# Patient Record
Sex: FEMALE | Race: WHITE | NOT HISPANIC OR LATINO | Employment: OTHER | URBAN - METROPOLITAN AREA
[De-identification: names, ages, dates, MRNs, and addresses within clinical notes are randomized per-mention and may not be internally consistent; named-entity substitution may affect disease eponyms.]

---

## 2017-02-06 ENCOUNTER — HOSPITAL ENCOUNTER (EMERGENCY)
Facility: MEDICAL CENTER | Age: 42
End: 2017-02-06
Payer: MEDICARE

## 2017-02-06 PROCEDURE — 302449 STATCHG TRIAGE ONLY (STATISTIC)

## 2017-02-06 NOTE — ED NOTES
"Pt amb up to triage desk speaking in full sentences stating: i dont want to barbara\" i explained triage process she still decided to leave. Will take out of system.  "

## 2017-02-17 ENCOUNTER — APPOINTMENT (OUTPATIENT)
Dept: RADIOLOGY | Facility: MEDICAL CENTER | Age: 42
End: 2017-02-17
Attending: EMERGENCY MEDICINE
Payer: MEDICARE

## 2017-02-17 ENCOUNTER — HOSPITAL ENCOUNTER (EMERGENCY)
Facility: MEDICAL CENTER | Age: 42
End: 2017-02-17
Attending: EMERGENCY MEDICINE
Payer: MEDICARE

## 2017-02-17 VITALS
TEMPERATURE: 97.4 F | SYSTOLIC BLOOD PRESSURE: 113 MMHG | HEIGHT: 68 IN | DIASTOLIC BLOOD PRESSURE: 72 MMHG | HEART RATE: 81 BPM | RESPIRATION RATE: 16 BRPM | WEIGHT: 155.87 LBS | OXYGEN SATURATION: 96 % | BODY MASS INDEX: 23.62 KG/M2

## 2017-02-17 DIAGNOSIS — R05.9 COUGH: ICD-10-CM

## 2017-02-17 DIAGNOSIS — M25.511 ACUTE PAIN OF RIGHT SHOULDER: ICD-10-CM

## 2017-02-17 PROCEDURE — 73030 X-RAY EXAM OF SHOULDER: CPT | Mod: RT

## 2017-02-17 PROCEDURE — A9270 NON-COVERED ITEM OR SERVICE: HCPCS | Performed by: EMERGENCY MEDICINE

## 2017-02-17 PROCEDURE — 700102 HCHG RX REV CODE 250 W/ 637 OVERRIDE(OP): Performed by: EMERGENCY MEDICINE

## 2017-02-17 PROCEDURE — 99284 EMERGENCY DEPT VISIT MOD MDM: CPT

## 2017-02-17 PROCEDURE — 71010 DX-CHEST-PORTABLE (1 VIEW): CPT

## 2017-02-17 RX ORDER — OXYCODONE HYDROCHLORIDE AND ACETAMINOPHEN 5; 325 MG/1; MG/1
1-2 TABLET ORAL EVERY 4 HOURS PRN
Qty: 10 TAB | Refills: 0 | Status: SHIPPED | OUTPATIENT
Start: 2017-02-17 | End: 2017-10-14

## 2017-02-17 RX ORDER — OXYCODONE HYDROCHLORIDE AND ACETAMINOPHEN 5; 325 MG/1; MG/1
1 TABLET ORAL ONCE
Status: COMPLETED | OUTPATIENT
Start: 2017-02-17 | End: 2017-02-17

## 2017-02-17 RX ADMIN — OXYCODONE HYDROCHLORIDE AND ACETAMINOPHEN 1 TABLET: 5; 325 TABLET ORAL at 13:46

## 2017-02-17 NOTE — ED AVS SNAPSHOT
2/17/2017          Nancy Olvera  [address]  [place] [address]    Dear Nancy:    [facility] wants to ensure your discharge home is safe and you or your loved ones have had all your questions answered regarding your care after you leave the hospital.    You may receive a telephone call within two days of your discharge.  This call is to make certain you understand your discharge instructions as well as ensure we provided you with the best care possible during your stay with us.     The call will only last approximately 3-5 minutes and will be done by a nurse.    Once again, we want to ensure your discharge home is safe and that you have a clear understanding of any next steps in your care.  If you have any questions or concerns, please do not hesitate to contact us, we are here for you.  Thank you for choosing [facility] for your healthcare needs.    Sincerely,    True Stewart    [facility]

## 2017-02-17 NOTE — ED AVS SNAPSHOT
"[employer] Access Code: [email or web address]  Expires: 2/25/2017 11:54 AM    Your email address is not on file at [facility] [employer].  Email Addresses are required for you to sign up for "[employer] please contact [phone] to verify your personal information and to provide your email address prior to attempting to register for "[employer].    Nancy Olvera  [address]  [place] [address]    "[employer]  A secure, online tool to manage your health information     [employer]’s "[employer]® is a secure, online tool that connects you to your personalized health information from the privacy of your home -- day or night - making it very easy for you to manage your healthcare. Once the activation process is completed, you can even access your medical information using the "[employer] chrissie, which is available for free in the Apple Chrissie store or Google Play store.     To learn more about "[employer] visit [email or web address]/"[employer]    There are two levels of access available (as shown below):   My Chart Features  [facility] Primary Care Doctor [facility]  Specialists [facility]  Urgent  Care Non-[facility] Primary Care Doctor   Email your healthcare team securely and privately 24/7 X X X    Manage appointments: schedule your next appointment; view details of past/upcoming appointments X      Request prescription refills. X      View recent personal medical records, including lab and immunizations X X X X   View health record, including health history, allergies, medications X X X X   Read reports about your outpatient visits, procedures, consult and ER notes X X X X   See your discharge summary, which is a recap of your hospital and/or ER visit that includes your diagnosis, lab results, and care plan X X  X     How to register for [employer]:  Once your e-mail address has been verified, follow the following steps to sign up for "[employer].     1. Go to  [email or web address]  2. Click on the Sign Up Now box, which takes you to the New Member Sign Up page. You will need  to provide the following information:  a. Enter your [employer] Access Code exactly as it appears at the top of this page. (You will not need to use this code after you’ve completed the sign-up process. If you do not sign up before the expiration date, you must request a new code.)   b. Enter your date of birth.   c. Enter your home email address.   d. Click Submit, and follow the next screen’s instructions.  3. Create a [employer] ID. This will be your [employer] login ID and cannot be changed, so think of one that is secure and easy to remember.  4. Create a [employer] password. You can change your password at any time.  5. Enter your Password Reset Question and Answer. This can be used at a later time if you forget your password.   6. Enter your e-mail address. This allows you to receive e-mail notifications when new information is available in [employer].  7. Click Sign Up. You can now view your health information.    For assistance activating your [employer] account, call [phone]

## 2017-02-17 NOTE — ED AVS SNAPSHOT
Home Care Instructions                                                                                                                Nancy lOvera   MRN: [id number]    Department:  [facility], Emergency Dept   Date of Visit:  2/17/2017            [facility], Emergency Dept    [address]    [place] [address]    Phone:  [phone]      You were seen by     Wing Villasenor M.D.      Your Diagnosis Was     Acute pain of right shoulder     M25.511       These are the medications you received during your hospitalization from 02/17/2017 1133 to 02/17/2017 1439     Date/Time Order Dose Route Action    02/17/2017 1346 oxycodone-acetaminophen (PERCOCET) 5-325 MG per tablet 1 Tab 1 Tab Oral Given      Follow-up Information     1. Follow up with [facility] In 1 week.    Contact information    [facility]  [place] [address] [phone]        Medication Information     Review all of your home medications and newly ordered medications with your primary doctor and/or pharmacist as soon as possible. Follow medication instructions as directed by your doctor and/or pharmacist.     Please keep your complete medication list with you and share with your physician. Update the information when medications are discontinued, doses are changed, or new medications (including over-the-counter products) are added; and carry medication information at all times in the event of emergency situations.               Medication List      START taking these medications        Instructions    oxycodone-acetaminophen 5-325 MG Tabs   Commonly known as:  PERCOCET    Take 1-2 Tabs by mouth every four hours as needed (pain).   Dose:  1-2 Tab         ASK your doctor about these medications        Instructions    clonazepam 1 MG Tabs   Commonly known as:  KLONOPIN    Take 0.5 mg by mouth 3 times a day.   Dose:  0.5 mg       divalproex 250 MG Tbec   Commonly known as:  DEPAKOTE    Take 250-750 mg by mouth  See Admin Instructions. 250 mg in morning 750 mg at bedtime   Dose:  250-750 mg       hydrocodone-acetaminophen 5-325 MG Tabs per tablet   Commonly known as:  NORCO    Take 1-2 Tabs by mouth every four hours as needed.   Dose:  1-2 Tab       * levothyroxine 75 MCG Tabs   Commonly known as:  SYNTHROID    Take 75 mcg by mouth Every morning on an empty stomach.   Dose:  75 mcg       * levothyroxine 75 MCG Tabs   Commonly known as:  SYNTHROID    Take 1 Tab by mouth Every morning on an empty stomach.   Dose:  75 mcg       omeprazole 40 MG delayed-release capsule   Commonly known as:  PRILOSEC    Take 40 mg by mouth every day.   Dose:  40 mg       ondansetron 4 MG Tbdp   Commonly known as:  ZOFRAN ODT    Take 4 mg by mouth every 8 hours as needed for Nausea/Vomiting (filled on 08/19/16).   Dose:  4 mg       propranolol 10 MG Tabs   Commonly known as:  INDERAL    Take 10 mg by mouth 2 times a day.   Dose:  10 mg       sucralfate 1 GM Tabs   Commonly known as:  CARAFATE    Take 1 g by mouth 4 Times a Day,Before Meals and at Bedtime.   Dose:  1 g       * Notice:  This list has 2 medication(s) that are the same as other medications prescribed for you. Read the directions carefully, and ask your doctor or other care provider to review them with you.            Procedures and tests performed during your visit     DX-CHEST-PORTABLE (1 VIEW)    DX-SHOULDER 2+ RIGHT        Discharge Instructions       Cough, Adult   A cough is a reflex. It helps you clear your throat and airways. A cough can help heal your body. A cough can last 2 or 3 weeks (acute) or may last more than 8 weeks (chronic). Some common causes of a cough can include an infection, allergy, or a cold.  HOME CARE  · Only take medicine as told by your doctor.  · If given, take your medicines (antibiotics) as told. Finish them even if you start to feel better.  · Use a cold steam vaporizer or humidifier in your home. This can help loosen thick spit (secretions).  · Sleep  so you are almost sitting up (semi-upright). Use pillows to do this. This helps reduce coughing.  · Rest as needed.  · Stop smoking if you smoke.  GET HELP RIGHT AWAY IF:  · You have yellowish-white fluid (pus) in your thick spit.  · Your cough gets worse.  · Your medicine does not reduce coughing, and you are losing sleep.  · You cough up blood.  · You have trouble breathing.  · Your pain gets worse and medicine does not help.  · You have a fever.  MAKE SURE YOU:   · Understand these instructions.  · Will watch your condition.  · Will get help right away if you are not doing well or get worse.     This information is not intended to replace advice given to you by your health care provider. Make sure you discuss any questions you have with your health care provider.     Document Released: 08/30/2012 Document Revised: 01/08/2016 Document Reviewed: 02/24/2016  [employer] Interactive Patient Education ©2016 [employer] Inc.    Musculoskeletal Pain  Musculoskeletal pain is muscle and barbara aches and pains. These pains can occur in any part of the body. Your caregiver may treat you without knowing the cause of the pain. They may treat you if blood or urine tests, X-rays, and other tests were normal.   CAUSES  There is often not a definite cause or reason for these pains. These pains may be caused by a type of germ (virus). The discomfort may also come from overuse. Overuse includes working out too hard when your body is not fit. Barbara aches also come from weather changes. Bone is sensitive to atmospheric pressure changes.  HOME CARE INSTRUCTIONS   · Ask when your test results will be ready. Make sure you get your test results.  · Only take over-the-counter or prescription medicines for pain, discomfort, or fever as directed by your caregiver. If you were given medications for your condition, do not drive, operate machinery or power tools, or sign legal documents for 24 hours. Do not drink alcohol. Do not take sleeping pills  or other medications that may interfere with treatment.  · Continue all activities unless the activities cause more pain. When the pain lessens, slowly resume normal activities. Gradually increase the intensity and duration of the activities or exercise.  · During periods of severe pain, bed rest may be helpful. Lay or sit in any position that is comfortable.  · Putting ice on the injured area.  ¨ Put ice in a bag.  ¨ Place a towel between your skin and the bag.  ¨ Leave the ice on for 15 to 20 minutes, 3 to 4 times a day.  · Follow up with your caregiver for continued problems and no reason can be found for the pain. If the pain becomes worse or does not go away, it may be necessary to repeat tests or do additional testing. Your caregiver may need to look further for a possible cause.  SEEK IMMEDIATE MEDICAL CARE IF:  · You have pain that is getting worse and is not relieved by medications.  · You develop chest pain that is associated with shortness or breath, sweating, feeling sick to your stomach (nauseous), or throw up (vomit).  · Your pain becomes localized to the abdomen.  · You develop any new symptoms that seem different or that concern you.  MAKE SURE YOU:   · Understand these instructions.  · Will watch your condition.  · Will get help right away if you are not doing well or get worse.     This information is not intended to replace advice given to you by your health care provider. Make sure you discuss any questions you have with your health care provider.     Document Released: 12/18/2006 Document Revised: 03/11/2013 Document Reviewed: 08/22/2014  [employer] Interactive Patient Education ©2016 [employer]    Shoulder Pain  The shoulder is the joint that connects your arms to your body. The bones that form the shoulder joint include the upper arm bone (humerus), the shoulder blade (scapula), and the collarbone (clavicle). The top of the humerus is shaped like a ball and fits into a rather flat socket on  the scapula (glenoid cavity). A combination of muscles and strong, fibrous tissues that connect muscles to bones (tendons) support your shoulder joint and hold the ball in the socket. Small, fluid-filled sacs (bursae) are located in different areas of the joint. They act as cushions between the bones and the overlying soft tissues and help reduce friction between the gliding tendons and the bone as you move your arm. Your shoulder joint allows a wide range of motion in your arm. This range of motion allows you to do things like scratch your back or throw a ball. However, this range of motion also makes your shoulder more prone to pain from overuse and injury.  Causes of shoulder pain can originate from both injury and overuse and usually can be grouped in the following four categories:  · Redness, swelling, and pain (inflammation) of the tendon (tendinitis) or the bursae (bursitis).  · Instability, such as a dislocation of the joint.  · Inflammation of the joint (arthritis).  · Broken bone (fracture).  HOME CARE INSTRUCTIONS   · Apply ice to the sore area.  · Put ice in a plastic bag.  · Place a towel between your skin and the bag.  · Leave the ice on for 15-20 minutes, 3-4 times per day for the first 2 days, or as directed by your health care provider.  · Stop using cold packs if they do not help with the pain.  · If you have a shoulder sling or immobilizer, wear it as long as your caregiver instructs. Only remove it to shower or bathe. Move your arm as little as possible, but keep your hand moving to prevent swelling.  · Squeeze a soft ball or foam pad as much as possible to help prevent swelling.  · Only take over-the-counter or prescription medicines for pain, discomfort, or fever as directed by your caregiver.  SEEK MEDICAL CARE IF:   · Your shoulder pain increases, or new pain develops in your arm, hand, or fingers.  · Your hand or fingers become cold and numb.  · Your pain is not relieved with  medicines.  SEEK IMMEDIATE MEDICAL CARE IF:   · Your arm, hand, or fingers are numb or tingling.  · Your arm, hand, or fingers are significantly swollen or turn white or blue.  MAKE SURE YOU:   · Understand these instructions.  · Will watch your condition.  · Will get help right away if you are not doing well or get worse.     This information is not intended to replace advice given to you by your health care provider. Make sure you discuss any questions you have with your health care provider.     Document Released: 09/27/2006 Document Revised: 01/08/2016 Document Reviewed: 04/11/2016  [employer] [facility] Interactive Patient Education ©2016 [employer]    Shoulder Pain  The shoulder is the joint that connects your arm to your body. Muscles and band-like tissues that connect bones to muscles (tendons) hold the joint together. Shoulder pain is felt if an injury or medical problem affects one or more parts of the shoulder.  HOME CARE   · Put ice on the sore area.  ¨ Put ice in a plastic bag.  ¨ Place a towel between your skin and the bag.  ¨ Leave the ice on for 15-20 minutes, 03-04 times a day for the first 2 days.  · Stop using cold packs if they do not help with the pain.  · If you were given something to keep your shoulder from moving (sling; shoulder immobilizer), wear it as told. Only take it off to shower or bathe.  · Move your arm as little as possible, but keep your hand moving to prevent puffiness (swelling).  · Squeeze a soft ball or foam pad as much as possible to help prevent swelling.  · Take medicine as told by your doctor.  GET HELP IF:  · You have progressing new pain in your arm, hand, or fingers.  · Your hand or fingers get cold.  · Your medicine does not help lessen your pain.  GET HELP RIGHT AWAY IF:   · Your arm, hand, or fingers are numb or tingling.  · Your arm, hand, or fingers are puffy (swollen), painful, or turn white or blue.  MAKE SURE YOU:   · Understand these instructions.  · Will watch your  condition.  · Will get help right away if you are not doing well or get worse.     This information is not intended to replace advice given to you by your health care provider. Make sure you discuss any questions you have with your health care provider.     Document Released: 06/05/2009 Document Revised: 01/08/2016 Document Reviewed: 04/11/2016  [employer] Interactive Patient Education ©2016 [employer]            Patient Information     Patient Information    Following emergency treatment: all patient requiring follow-up care must return either to a private physician or a clinic if your condition worsens before you are able to obtain further medical attention, please return to the emergency room.     Billing Information    At [facility], we work to make the billing process streamlined for our patients.  Our Representatives are here to answer any questions you may have regarding your hospital bill.  If you have insurance coverage and have supplied your insurance information to us, we will submit a claim to your insurer on your behalf.  Should you have any questions regarding your bill, we can be reached online or by phone as follows:  Online: You are able pay your bills online or live chat with our representatives about any billing questions you may have. We are here to help Monday - Friday from 8:00am to 7:30pm and 9:00am - 12:00pm on Saturdays.  Please visit [email or web address]  for more information.   Phone:  [phone] or [phone]    Please note that your emergency physician, surgeon, pathologist, radiologist, anesthesiologist, and other specialists are not employed by [facility] and will therefore bill separately for their services.  Please contact them directly for any questions concerning their bills at the numbers below:     Emergency Physician Services:  [phone]  [facility]:  [phone]  [facility]:  [phone]  [facility]:  [phone]    1. Your final bill may vary from the amount quoted upon discharge if all procedures are not complete at that time, or if your doctor has additional procedures of which we are not aware. You will receive an additional bill if you return to the Emergency Department at [facility] for suture removal regardless of the facility of which the sutures were placed.     2. Please arrange for settlement of this account at the emergency registration.    3. All self-pay accounts are due in full at the time of treatment.  If you are unable to meet this obligation then payment is expected within 4-5 days.     4. If you have had radiology studies (CT, X-ray, Ultrasound, MRI), you have received a preliminary result during your emergency department visit. Please contact the radiology department [phone] to receive a copy of your final result. Please discuss the Final result with your primary physician or with the follow up physician provided.     Crisis Hotline:  [place] Crisis Hotline:  7-370-HRYZLNL or 1-594.506.7486  [place] Crisis Hotline:    1-327.714.4059 or 968-828-9077         ED Discharge Follow Up Questions    1. In order to provide you with very good care, we would like to follow up with a phone call in the next few days.  May we have your permission to contact you?     YES /  NO    2. What is the best phone number to call you? (       )_____-__________    3. What is the best time to call you?      Morning  /  Afternoon  /  Evening                   Patient Signature:  ____________________________________________________________    Date:  ____________________________________________________________

## 2017-02-17 NOTE — ED NOTES
Pt ambs to triage  Chief Complaint   Patient presents with   • Shoulder Pain     Pt was coughing so hard that she slammed her arm into the wall and hurt her shoulder.  Feels like an electric shock in arm   • Cough     is being treated for PNA has taken her antibx but still has a cough   Triage process explained. Pt asked to await room assignment in [facility]. Pt urged to inform triage RN or staff of changes in condition or concerns

## 2017-02-17 NOTE — DISCHARGE INSTRUCTIONS
Cough, Adult   A cough is a reflex. It helps you clear your throat and airways. A cough can help heal your body. A cough can last 2 or 3 weeks (acute) or may last more than 8 weeks (chronic). Some common causes of a cough can include an infection, allergy, or a cold.  HOME CARE  · Only take medicine as told by your doctor.  · If given, take your medicines (antibiotics) as told. Finish them even if you start to feel better.  · Use a cold steam vaporizer or humidifier in your home. This can help loosen thick spit (secretions).  · Sleep so you are almost sitting up (semi-upright). Use pillows to do this. This helps reduce coughing.  · Rest as needed.  · Stop smoking if you smoke.  GET HELP RIGHT AWAY IF:  · You have yellowish-white fluid (pus) in your thick spit.  · Your cough gets worse.  · Your medicine does not reduce coughing, and you are losing sleep.  · You cough up blood.  · You have trouble breathing.  · Your pain gets worse and medicine does not help.  · You have a fever.  MAKE SURE YOU:   · Understand these instructions.  · Will watch your condition.  · Will get help right away if you are not doing well or get worse.     This information is not intended to replace advice given to you by your health care provider. Make sure you discuss any questions you have with your health care provider.     Document Released: 08/30/2012 Document Revised: 01/08/2016 Document Reviewed: 02/24/2016  [employer] Interactive Patient Education ©2016 [employer]    Musculoskeletal Pain  Musculoskeletal pain is muscle and gregg aches and pains. These pains can occur in any part of the body. Your caregiver may treat you without knowing the cause of the pain. They may treat you if blood or urine tests, X-rays, and other tests were normal.   CAUSES  There is often not a definite cause or reason for these pains. These pains may be caused by a type of germ (virus). The discomfort may also come from overuse. Overuse includes working out  too hard when your body is not fit. Barbara aches also come from weather changes. Bone is sensitive to atmospheric pressure changes.  HOME CARE INSTRUCTIONS   · Ask when your test results will be ready. Make sure you get your test results.  · Only take over-the-counter or prescription medicines for pain, discomfort, or fever as directed by your caregiver. If you were given medications for your condition, do not drive, operate machinery or power tools, or sign legal documents for 24 hours. Do not drink alcohol. Do not take sleeping pills or other medications that may interfere with treatment.  · Continue all activities unless the activities cause more pain. When the pain lessens, slowly resume normal activities. Gradually increase the intensity and duration of the activities or exercise.  · During periods of severe pain, bed rest may be helpful. Lay or sit in any position that is comfortable.  · Putting ice on the injured area.  ¨ Put ice in a bag.  ¨ Place a towel between your skin and the bag.  ¨ Leave the ice on for 15 to 20 minutes, 3 to 4 times a day.  · Follow up with your caregiver for continued problems and no reason can be found for the pain. If the pain becomes worse or does not go away, it may be necessary to repeat tests or do additional testing. Your caregiver may need to look further for a possible cause.  SEEK IMMEDIATE MEDICAL CARE IF:  · You have pain that is getting worse and is not relieved by medications.  · You develop chest pain that is associated with shortness or breath, sweating, feeling sick to your stomach (nauseous), or throw up (vomit).  · Your pain becomes localized to the abdomen.  · You develop any new symptoms that seem different or that concern you.  MAKE SURE YOU:   · Understand these instructions.  · Will watch your condition.  · Will get help right away if you are not doing well or get worse.     This information is not intended to replace advice given to you by your health care  provider. Make sure you discuss any questions you have with your health care provider.     Document Released: 12/18/2006 Document Revised: 03/11/2013 Document Reviewed: 08/22/2014  [employer] Interactive Patient Education ©2016 [employer]    Shoulder Pain  The shoulder is the joint that connects your arms to your body. The bones that form the shoulder joint include the upper arm bone (humerus), the shoulder blade (scapula), and the collarbone (clavicle). The top of the humerus is shaped like a ball and fits into a rather flat socket on the scapula (glenoid cavity). A combination of muscles and strong, fibrous tissues that connect muscles to bones (tendons) support your shoulder joint and hold the ball in the socket. Small, fluid-filled sacs (bursae) are located in different areas of the joint. They act as cushions between the bones and the overlying soft tissues and help reduce friction between the gliding tendons and the bone as you move your arm. Your shoulder joint allows a wide range of motion in your arm. This range of motion allows you to do things like scratch your back or throw a ball. However, this range of motion also makes your shoulder more prone to pain from overuse and injury.  Causes of shoulder pain can originate from both injury and overuse and usually can be grouped in the following four categories:  · Redness, swelling, and pain (inflammation) of the tendon (tendinitis) or the bursae (bursitis).  · Instability, such as a dislocation of the joint.  · Inflammation of the joint (arthritis).  · Broken bone (fracture).  HOME CARE INSTRUCTIONS   · Apply ice to the sore area.  · Put ice in a plastic bag.  · Place a towel between your skin and the bag.  · Leave the ice on for 15-20 minutes, 3-4 times per day for the first 2 days, or as directed by your health care provider.  · Stop using cold packs if they do not help with the pain.  · If you have a shoulder sling or immobilizer, wear it as long as your  caregiver instructs. Only remove it to shower or bathe. Move your arm as little as possible, but keep your hand moving to prevent swelling.  · Squeeze a soft ball or foam pad as much as possible to help prevent swelling.  · Only take over-the-counter or prescription medicines for pain, discomfort, or fever as directed by your caregiver.  SEEK MEDICAL CARE IF:   · Your shoulder pain increases, or new pain develops in your arm, hand, or fingers.  · Your hand or fingers become cold and numb.  · Your pain is not relieved with medicines.  SEEK IMMEDIATE MEDICAL CARE IF:   · Your arm, hand, or fingers are numb or tingling.  · Your arm, hand, or fingers are significantly swollen or turn white or blue.  MAKE SURE YOU:   · Understand these instructions.  · Will watch your condition.  · Will get help right away if you are not doing well or get worse.     This information is not intended to replace advice given to you by your health care provider. Make sure you discuss any questions you have with your health care provider.     Document Released: 09/27/2006 Document Revised: 01/08/2016 Document Reviewed: 04/11/2016  [employer] Interactive Patient Education ©2016 [employer]    Shoulder Pain  The shoulder is the joint that connects your arm to your body. Muscles and band-like tissues that connect bones to muscles (tendons) hold the joint together. Shoulder pain is felt if an injury or medical problem affects one or more parts of the shoulder.  HOME CARE   · Put ice on the sore area.  ¨ Put ice in a plastic bag.  ¨ Place a towel between your skin and the bag.  ¨ Leave the ice on for 15-20 minutes, 03-04 times a day for the first 2 days.  · Stop using cold packs if they do not help with the pain.  · If you were given something to keep your shoulder from moving (sling; shoulder immobilizer), wear it as told. Only take it off to shower or bathe.  · Move your arm as little as possible, but keep your hand moving to prevent puffiness  (swelling).  · Squeeze a soft ball or foam pad as much as possible to help prevent swelling.  · Take medicine as told by your doctor.  GET HELP IF:  · You have progressing new pain in your arm, hand, or fingers.  · Your hand or fingers get cold.  · Your medicine does not help lessen your pain.  GET HELP RIGHT AWAY IF:   · Your arm, hand, or fingers are numb or tingling.  · Your arm, hand, or fingers are puffy (swollen), painful, or turn white or blue.  MAKE SURE YOU:   · Understand these instructions.  · Will watch your condition.  · Will get help right away if you are not doing well or get worse.     This information is not intended to replace advice given to you by your health care provider. Make sure you discuss any questions you have with your health care provider.     Document Released: 06/05/2009 Document Revised: 01/08/2016 Document Reviewed: 04/11/2016  [employer] Interactive Patient Education ©2016 [employer]

## 2017-02-17 NOTE — ED PROVIDER NOTES
"ED Provider Note    Scribed for Wing Villasenor M.D. by Sigrid Rivera. 2/17/2017  12:11 PM    Primary Care Provider: None  Means of arrival: Walk-in  History limited by: None    CHIEF COMPLAINT  Chief Complaint   Patient presents with   • Shoulder Pain     Pt was coughing so hard that she slammed her arm into the wall and hurt her shoulder.  Feels like an electric shock in arm   • Cough     is being treated for PNA has taken her antibx but still has a cough       HPI  Nancy Olvera is a 42 y.o. female who presents to the ED complaining of right shoulder pain status post trauma onset yesterday. The patient was seen by her PCP previously for a cough and is being treated for pneumonia with Zithromax. She states she continues to experience a cough with \"several coughing attacks\". Patient states she was having another coughing fit yesterday when she quickly lost vision in her eyes and slammed her right upper extremity into a wall, developed right shoulder pain immediately.  She describes the pain as an \"electric shock in her arm\", per nurse's notes. She denies any production of phlegm, headache, blurred vision, vomiting, or diarrhea. Patient did not medicate at home for pain relief.     REVIEW OF SYSTEMS  EYES:  Denies vision changes  ENT:  Denies sore throat, nose, or ear pain  RESPIRATORY: Cough, denies production of phlegm  GI:  Denies nausea, vomiting, or diarrhea  MUSCULOSKELETAL: Right shoulder pain  NEUROLOGIC:  Denies headache    E.     PAST MEDICAL HISTORY  Past Medical History   Diagnosis Date   • Thyroid condition    • Congestive heart failure (CMS-HCC)    • Hypertension    • Psychiatric disorder    • ADHD (attention deficit hyperactivity disorder)    • Bipolar affective disorder (CMS-HCC)    • Cancer (CMS-HCC)      pt states she has colon cancer     FAMILY HISTORY  No family history noted    SOCIAL HISTORY   reports that she has been smoking Cigarettes.  She has been smoking about 0.50 packs per day. She reports " "that she drinks alcohol. She reports that she uses illicit drugs.    SURGICAL HISTORY  Past Surgical History   Procedure Laterality Date   • Tonsillectomy         CURRENT MEDICATIONS  No current facility-administered medications for this encounter.    Current outpatient prescriptions:   •  levothyroxine (SYNTHROID) 75 MCG Tab, Take 1 Tab by mouth Every morning on an empty stomach., Disp: 30 Tab, Rfl: 0  •  hydrocodone-acetaminophen (NORCO) 5-325 MG Tab per tablet, Take 1-2 Tabs by mouth every four hours as needed., Disp: 10 Tab, Rfl: 0  •  propranolol (INDERAL) 10 MG Tab, Take 10 mg by mouth 2 times a day., Disp: , Rfl:   •  levothyroxine (SYNTHROID) 75 MCG Tab, Take 75 mcg by mouth Every morning on an empty stomach., Disp: , Rfl:   •  omeprazole (PRILOSEC) 40 MG delayed-release capsule, Take 40 mg by mouth every day., Disp: , Rfl:   •  clonazepam (KLONOPIN) 1 MG Tab, Take 0.5 mg by mouth 3 times a day., Disp: , Rfl:   •  divalproex (DEPAKOTE) 250 MG Tablet Delayed Response, Take 250-750 mg by mouth See Admin Instructions. 250 mg in morning 750 mg at bedtime, Disp: , Rfl:   •  sucralfate (CARAFATE) 1 GM Tab, Take 1 g by mouth 4 Times a Day,Before Meals and at Bedtime., Disp: , Rfl:   •  ondansetron (ZOFRAN ODT) 4 MG TABLET DISPERSIBLE, Take 4 mg by mouth every 8 hours as needed for Nausea/Vomiting (filled on 08/19/16)., Disp: , Rfl:     ALLERGIES  Allergies   Allergen Reactions   • Aspirin Anaphylaxis   • Compazine Swelling   • Sulfa Drugs Rash   • Tetracyclines Swelling   • Ultram [Tramadol Hcl] Swelling   • Food      \"Green Peppers\"       PHYSICAL EXAM  VITAL SIGNS: /73 mmHg  Pulse 101  Temp(Src) 36.3 °C (97.4 °F) (Temporal)  Resp 14  Ht 1.727 m (5' 7.99\")  Wt 70.7 kg (155 lb 13.8 oz)  BMI 23.70 kg/m2  SpO2 96%     Constitutional: Patient is awake and alert. No acute respiratory distress. Well developed, Well nourished, Non-toxic appearance.  HENT: Normocephalic, AtraumaticEyes:  Sclera and " conjunctiva clear, No discharge.   Neck:  Supple no nuchal rigidity, no thyromegaly or mass. Non-tender  Lymphatic: No supraclavicular  lymph nodes.   Cardiovascular: Heart is regular rate and rhythm   Thorax & Lungs: Chest is symmetrical, with good breath sounds. No wheezing or crackles. No respiratory distress,   Skin: Warm, Dry, no petechia, purpura, or rash.   Extremities: No edema.   Musculoskeletal: Tenderness right shoulder with range of motion mostly in the trapezius muscle on the right side.  Neurologic: Alert & oriented  Strength is symmetrical lower extremities. Good strength in left arm. Somewhat decreased due to right shoulder pain with range of motion. Good .         RADIOLOGY/PROCEDURES  DX-SHOULDER 2+ RIGHT   Final Result      No acute fracture of the right humerus identified. If symptoms are persistent, follow-up imaging would be recommended.      DX-CHEST-PORTABLE (1 VIEW)   Final Result      Negative single view of the chest.      The radiologist's interpretations of all radiological studies have been reviewed by me.     COURSE & MEDICAL DECISION MAKING  Pertinent Labs & Imaging studies reviewed. (See chart for details)    Differential diagnoses include but are not limited to fracture vs contusion.    12:11 PM - Patient seen and examined at bedside. Ordered DX-shoulder right and DX-chest. I have signed into and reviewed the patient's prescription monitoring program data prior to prescribing a scheduled drug.    1:14 PM Patient is complaining of pain and is requesting pain medications. She states she is not allergic to percocet, so she will be medicated with a dose of Percocet.     2:16 PM Reviewed the patient's imaging results, which are noted above. Patient was reevaluated at bedside. She reports pain relief after medication administration. Discussed radiology results with the patient and informed them that results were unremarkable. She is advised to follow up with her PCP if pain persists  and will be prescribed Percocet for pain relief. The patient will be discharged and should return if symptoms worsen or if new symptoms arise. The patient understands and agrees to plan.     Decision Making  Patient states she fell against her right shoulder. Pain to right shoulder and chest also had a cough. She's been on Zithromax and erythromycin. The coughs though there but she says that the infection like feeling since. At her. X-rays of her shoulder shows no fractures. Chest x-ray showed no pneumonia. This period time we will treat her for musculoskeletal pain. She will follow up with the heart clinic within one week. No obvious signs of fractures.    I reviewed prescription monitoring program for patient's narcotic use before prescribing a scheduled drug.The patient will not drink alcohol nor drive with prescribed medications. The patient will return for new or worsening symptoms and is stable at the time of discharge.    The patient is referred to a primary physician for blood pressure management, diabetic screening, and for all other preventative health concerns.    DISPOSITION:  Patient will be discharged home in stable condition.    FOLLOW UP:  [facility]  [id number]  [place] [address]  [phone]    In 1 week        OUTPATIENT MEDICATIONS:  New Prescriptions    OXYCODONE-ACETAMINOPHEN (PERCOCET) 5-325 MG TAB    Take 1-2 Tabs by mouth every four hours as needed (pain).     FINAL IMPRESSION  1. Acute pain of right shoulder    2. Cough        PLAN  1. Follow-up primary care doctor [facility] within 1 week  2. Shoulder pain information sheets/narcotic information sheet  3. Return to the emergency department for increased pains, fevers, vomiting or change in condition.     Sigrid QURESHI (Kayla), am scribing for, and in the presence of, Wing Villasenor M.D..    Electronically signed by: Sigrid Graham), 2/17/2017    Wing QURESHI M.D. personally performed the services described in this  documentation, as scribed by Sigrid Rivera in my presence, and it is both accurate and complete.    The note accurately reflects work and decisions made by me.  Wing Villasenor  2/17/2017  5:19 PM

## 2017-02-17 NOTE — ED NOTES
Patient states that she almost fell yesterday but caught herself, now having pain in her right shoulder.  Also has cough

## 2017-03-04 ENCOUNTER — HOSPITAL ENCOUNTER (EMERGENCY)
Facility: MEDICAL CENTER | Age: 42
End: 2017-03-04
Attending: EMERGENCY MEDICINE
Payer: MEDICARE

## 2017-03-04 VITALS
HEART RATE: 80 BPM | OXYGEN SATURATION: 96 % | HEIGHT: 68 IN | DIASTOLIC BLOOD PRESSURE: 68 MMHG | SYSTOLIC BLOOD PRESSURE: 112 MMHG | TEMPERATURE: 97.7 F | RESPIRATION RATE: 18 BRPM

## 2017-03-04 DIAGNOSIS — Z72.0 TOBACCO ABUSE: ICD-10-CM

## 2017-03-04 DIAGNOSIS — M54.50 ACUTE LEFT-SIDED LOW BACK PAIN WITHOUT SCIATICA: ICD-10-CM

## 2017-03-04 DIAGNOSIS — J01.00 ACUTE NON-RECURRENT MAXILLARY SINUSITIS: ICD-10-CM

## 2017-03-04 PROCEDURE — 99283 EMERGENCY DEPT VISIT LOW MDM: CPT

## 2017-03-04 PROCEDURE — 36415 COLL VENOUS BLD VENIPUNCTURE: CPT

## 2017-03-04 RX ORDER — CEFDINIR 300 MG/1
300 CAPSULE ORAL 2 TIMES DAILY
Qty: 20 CAP | Refills: 0 | Status: SHIPPED | OUTPATIENT
Start: 2017-03-04 | End: 2017-10-14

## 2017-03-04 RX ORDER — NICOTINE 21 MG/24HR
1 PATCH, TRANSDERMAL 24 HOURS TRANSDERMAL EVERY 24 HOURS
Qty: 30 PATCH | Refills: 1 | Status: SHIPPED | OUTPATIENT
Start: 2017-03-04 | End: 2018-11-25 | Stop reason: CLARIF

## 2017-03-04 RX ORDER — ALBUTEROL SULFATE 2.5 MG/3ML
2.5 SOLUTION RESPIRATORY (INHALATION) EVERY 4 HOURS PRN
Qty: 25 ML | Refills: 1 | Status: SHIPPED | OUTPATIENT
Start: 2017-03-04 | End: 2018-11-25 | Stop reason: CLARIF

## 2017-03-04 RX ORDER — HYDROCODONE BITARTRATE AND ACETAMINOPHEN 5; 325 MG/1; MG/1
1-2 TABLET ORAL EVERY 4 HOURS PRN
Qty: 20 TAB | Refills: 0 | Status: SHIPPED | OUTPATIENT
Start: 2017-03-04 | End: 2017-10-14

## 2017-03-04 RX ORDER — NICOTINE 21 MG/24HR
1 PATCH, TRANSDERMAL 24 HOURS TRANSDERMAL ONCE
Status: DISCONTINUED | OUTPATIENT
Start: 2017-03-04 | End: 2017-03-04 | Stop reason: HOSPADM

## 2017-03-04 ASSESSMENT — LIFESTYLE VARIABLES: DO YOU DRINK ALCOHOL: NO

## 2017-03-04 NOTE — ED NOTES
.  Chief Complaint   Patient presents with   • Cough     productive cough x3 days   • Ear Pain   • Sore Throat   • Back Pain     .Pt Informed regarding triage process and verbalized understanding to inform triage tech or RN for any changes in condition.  Placed in lobby.

## 2017-03-04 NOTE — ED NOTES
"Pt to red 4 from lobby. C/o back pain, SOB, chest pain \"from breathing.\" On monitor. ERP to see.   "

## 2017-03-04 NOTE — ED PROVIDER NOTES
"ED Provider Note    Scribed for Angélica Padilla M.D. by Angie Sarmiento. 3/4/2017  6:24 AM    Primary care provider: Pcp Pt States None  Means of arrival: Walk-In  History obtained from: Patient  History limited by: None    CHIEF COMPLAINT  Chief Complaint   Patient presents with   • Cough     productive cough x3 days   • Ear Pain     lt ear pain   • Sore Throat   • Back Pain     CARLTON Olvera is a 42 y.o. female who presents to the Emergency Department with persistent productive cough, left ear pain, throat pain, and back pain onset three days ago.  The patient's symptoms have remained constant in nature.  She reports her back pain worsening since onset.  Fourteen years ago she had back surgery and believes she has \"popped\" her back due to her cough.  The patient reports being unable to stand straight due to pain radiating down her spine.  Secondary to her cough and back pain, the patient has developed acute, occasional urinary incontinence.  The patient has tried to treat her symptoms with Mucinex but has not found relief.  Currently, the patient has continued cough and pain.  She does not note recent fevers, chills, nausea, vomiting, abdominal pain, weakness, headache, or leg swelling.   The patient smokes cigarettes.  She denies recent alcohol consumption or drug use.  The patient denies additional symptoms or further pertinent medical history.     REVIEW OF SYSTEMS  HEENT:  Positive for ear pain, congestion, sore throat   CARDIAC: no chest pain, no palpitations    PULMONARY: no dyspnea Positive for cough  GI: no vomiting, diarrhea, or abdominal pain   : no dysuria, hematuria Positive for back pain, incontinence due to cough  Neuro: no weakness, numbness, aphasia, or headache  Musculoskeletal: no swelling, deformity, pain, or joint swelling  Endocrine: no fevers, sweating, or weight loss     See history of present illness. All other systems are negative. C.     PAST MEDICAL HISTORY   has a past medical " history of Thyroid condition; Congestive heart failure (CMS-HCC); Hypertension; Psychiatric disorder; ADHD (attention deficit hyperactivity disorder); Bipolar affective disorder (CMS-HCC); and Cancer (CMS-HCC).    SURGICAL HISTORY   has past surgical history that includes tonsillectomy.   Back surgery    SOCIAL HISTORY  Social History   Substance Use Topics   • Smoking status: Current Some Day Smoker -- 0.50 packs/day     Types: Cigarettes   • Smokeless tobacco: None   • Alcohol Use: No      History   Drug Use   • Yes     Comment: medical marijuana     FAMILY HISTORY  History reviewed. No pertinent family history.    CURRENT MEDICATIONS  No current facility-administered medications on file prior to encounter.     Current Outpatient Prescriptions on File Prior to Encounter   Medication Sig Dispense Refill   • oxycodone-acetaminophen (PERCOCET) 5-325 MG Tab Take 1-2 Tabs by mouth every four hours as needed (pain). 10 Tab 0   • levothyroxine (SYNTHROID) 75 MCG Tab Take 1 Tab by mouth Every morning on an empty stomach. 30 Tab 0   • hydrocodone-acetaminophen (NORCO) 5-325 MG Tab per tablet Take 1-2 Tabs by mouth every four hours as needed. 10 Tab 0   • propranolol (INDERAL) 10 MG Tab Take 10 mg by mouth 2 times a day.     • levothyroxine (SYNTHROID) 75 MCG Tab Take 75 mcg by mouth Every morning on an empty stomach.     • omeprazole (PRILOSEC) 40 MG delayed-release capsule Take 40 mg by mouth every day.     • clonazepam (KLONOPIN) 1 MG Tab Take 0.5 mg by mouth 3 times a day.     • divalproex (DEPAKOTE) 250 MG Tablet Delayed Response Take 250-750 mg by mouth See Admin Instructions. 250 mg in morning  750 mg at bedtime     • sucralfate (CARAFATE) 1 GM Tab Take 1 g by mouth 4 Times a Day,Before Meals and at Bedtime.     • ondansetron (ZOFRAN ODT) 4 MG TABLET DISPERSIBLE Take 4 mg by mouth every 8 hours as needed for Nausea/Vomiting (filled on 08/19/16).       ALLERGIES  Allergies   Allergen Reactions   • Aspirin Anaphylaxis  "  • Compazine Swelling   • Sulfa Drugs Rash   • Tetracyclines Swelling   • Ultram [Tramadol Hcl] Swelling   • Food      \"Green Peppers\"     PHYSICAL EXAM  VITAL SIGNS: /68 mmHg  Pulse 55  Temp(Src) 36.5 °C (97.7 °F)  Resp 18  Ht 1.727 m (5' 8\")  SpO2 96%  LMP 02/04/2017 (Approximate)    Constitutional: Well developed, Well nourished, No acute distress, Non-toxic appearance.   HEENT: Normocephalic, Atraumatic,  external ears normal, pharynx pink,  Mucous Membranes moist, No rhinorrhea or mucosal edema, Maxillary sinus Tenderness, Fluid-filled TM's without infection, Posterior pharyngeal drainage,   Eyes: PERRL, EOMI, Conjunctiva normal, No discharge.   Neck: Normal range of motion, No tenderness, Supple, No stridor.   Lymphatic: No lymphadenopathy    Cardiovascular: Regular Rate and Rhythm, No murmurs,  rubs, or gallops.   Thorax & Lungs: Lungs clear to auscultation bilaterally, No respiratory distress, No wheezes, rhales or rhonchi, No chest wall tenderness.   Abdomen: Bowel sounds normal, Soft, non tender, non distended,  No pulsatile masses., no rebound guarding or peritoneal signs.   Skin: Warm, Dry, No erythema, No rash,   Back:  Left-sided back pain,  No spinal tenderness, bony crepitance, step offs, or instability.   Neurologic: Alert & oriented x 3, Normal motor function, Normal sensory function, No focal deficits noted. Normal reflexes. Normal Cranial Nerves. Normal strength and sensation.   Extremities: Equal, intact distal pulses, No cyanosis, clubbing or edema,  No tenderness.   Musculoskeletal: Good range of motion in all major joints. No tenderness to palpation or major deformities noted.     COURSE & MEDICAL DECISION MAKING  Nursing notes, VS, PMSFHx reviewed in chart.    Review of past medical records shows the patient was seen here on 2/17/17 for shoulder pain with cough.  The patient was seen by Dr. Villasenor and prescribed Percocet.  The patient's Tox screen was positive for methamphetamine " and benzodiazepines.  The patient was not prescribed antibiotics.     6:24 AM - Patient seen and examined at bedside.  Patient presents with acute maxillary sinusitis. I discussed her plans for discharge with a prescription for Norco, Proventil, Omnicef, and Nicoderm. She was encouraged to stop smoking.  The patient was given a referral to [facility] and was instructed to return to the ED if her symptoms worsen. The patient will receive further information on sinusitis, back pain, and back exercises to take home.  All questions and concerns were addressed.  Patient understands and agrees.     I reviewed prescription monitoring program for patient's narcotic use before prescribing a scheduled drug. Her  score is 180. The patient will not drink alcohol nor drive with prescribed medications. The patient will return for new or worsening symptoms and is stable at the time of discharge.The patient is referred to a primary physician for blood pressure management, diabetic screening, and for all other preventative health concerns.    DISPOSITION:  Patient will be discharged home in stable condition.    FOLLOW UP:  [address] [address] [phone]    keep appointment next week    OUTPATIENT MEDICATIONS:  Discharge Medication List as of 3/4/2017  6:39 AM      START taking these medications    Details   !! hydrocodone-acetaminophen (NORCO) 5-325 MG Tab per tablet Take 1-2 Tabs by mouth every four hours as needed., Disp-20 Tab, R-0, Print Rx Paper      albuterol (PROVENTIL) 2.5mg/3ml Nebu Soln solution for nebulization 3 mL by Nebulization route every four hours as needed for Shortness of Breath., Disp-25 mL, R-1, Print Rx Paper      cefdinir (OMNICEF) 300 MG Cap Take 1 Cap by mouth 2 times a day., Disp-20 Cap, R-0, Print Rx Paper      nicotine (NICODERM) 14 MG/24HR PATCH 24 HR Apply 1 Patch to skin as directed every 24 hours., Disp-30 Patch, R-1,  Print Rx Paper       !! - Potential duplicate medications found. Please discuss with provider.        FINAL IMPRESSION  1. Acute non-recurrent maxillary sinusitis    2. Acute left-sided low back pain without sciatica    3. Tobacco abuse        Angie QURESHI (Scribe), am scribing for, and in the presence of, Angélica Padilla M.D..    Electronically signed by: Angie Sarmiento (Scribe), 3/4/2017    IAngélica M.D. personally performed the services described in this documentation, as scribed by Angie Sarmiento in my presence, and it is both accurate and complete.    The note accurately reflects work and decisions made by me.  Angélica Padilla  3/4/2017  12:58 PM

## 2017-03-04 NOTE — DISCHARGE INSTRUCTIONS
Back Exercises  Back exercises help treat and prevent back injuries. The goal is to increase your strength in your belly (abdominal) and back muscles. These exercises can also help with flexibility. Start these exercises when told by your doctor.  HOME CARE  Back exercises include:  Pelvic Tilt.  · Lie on your back with your knees bent. Tilt your pelvis until the lower part of your back is against the floor. Hold this position 5 to 10 sec. Repeat this exercise 5 to 10 times.  Knee to Chest.  · Pull 1 knee up against your chest and hold for 20 to 30 seconds. Repeat this with the other knee. This may be done with the other leg straight or bent, whichever feels better. Then, pull both knees up against your chest.  Sit-Ups or Curl-Ups.  · Bend your knees 90 degrees. Start with tilting your pelvis, and do a partial, slow sit-up. Only lift your upper half 30 to 45 degrees off the floor. Take at least 2 to 3 seonds for each sit-up. Do not do sit-ups with your knees out straight. If partial sit-ups are difficult, simply do the above but with only tightening your belly (abdominal) muscles and holding it as told.  Hip-Lift.  · Lie on your back with your knees flexed 90 degrees. Push down with your feet and shoulders as you raise your hips 2 inches off the floor. Hold for 10 seconds, repeat 5 to 10 times.  Back Arches.  · Lie on your stomach. Prop yourself up on bent elbows. Slowly press on your hands, causing an arch in your low back. Repeat 3 to 5 times.  Shoulder-Lifts.  · Lie face down with arms beside your body. Keep hips and belly pressed to floor as you slowly lift your head and shoulders off the floor.  Do not overdo your exercises. Be careful in the beginning. Exercises may cause you some mild back discomfort. If the pain lasts for more than 15 minutes, stop the exercises until you see your doctor. Improvement with exercise for back problems is slow.      This information is not intended to replace advice given to you  by your health care provider. Make sure you discuss any questions you have with your health care provider.     Document Released: 01/20/2012 Document Revised: 03/11/2013 Document Reviewed: 02/11/2016  [employer] Interactive Patient Education ©2016 [employer]    Back Pain, Adult  Back pain is very common. The pain often gets better over time. The cause of back pain is usually not dangerous. Most people can learn to manage their back pain on their own.   HOME CARE   Watch your back pain for any changes. The following actions may help to lessen any pain you are feeling:  · Stay active. Start with short walks on flat ground if you can. Try to walk farther each day.  · Exercise regularly as told by your doctor. Exercise helps your back heal faster. It also helps avoid future injury by keeping your muscles strong and flexible.  · Do not sit, drive, or  one place for more than 30 minutes.  · Do not stay in bed. Resting more than 1-2 days can slow down your recovery.  · Be careful when you bend or lift an object. Use good form when lifting:  · Bend at your knees.  · Keep the object close to your body.  · Do not twist.  · Sleep on a firm mattress. Lie on your side, and bend your knees. If you lie on your back, put a pillow under your knees.  · Take medicines only as told by your doctor.  · Put ice on the injured area.  · Put ice in a plastic bag.  · Place a towel between your skin and the bag.  · Leave the ice on for 20 minutes, 2-3 times a day for the first 2-3 days. After that, you can switch between ice and heat packs.  · Avoid feeling anxious or stressed. Find good ways to deal with stress, such as exercise.  · Maintain a healthy weight. Extra weight puts stress on your back.  GET HELP IF:   · You have pain that does not go away with rest or medicine.  · You have worsening pain that goes down into your legs or buttocks.  · You have pain that does not get better in one week.  · You have pain at night.  · You lose  weight.  · You have a fever or chills.  GET HELP RIGHT AWAY IF:   · You cannot control when you poop (bowel movement) or pee (urinate).  · Your arms or legs feel weak.  · Your arms or legs lose feeling (numbness).  · You feel sick to your stomach (nauseous) or throw up (vomit).  · You have belly (abdominal) pain.  · You feel like you may pass out (faint).     This information is not intended to replace advice given to you by your health care provider. Make sure you discuss any questions you have with your health care provider.     Document Released: 06/05/2009 Document Revised: 01/08/2016 Document Reviewed: 04/21/2015  [employer] Interactive Patient Education ©2016 [employer]    Sinusitis, Adult  Sinusitis is redness, soreness, and inflammation of the paranasal sinuses. Paranasal sinuses are air pockets within the bones of your face. They are located beneath your eyes, in the middle of your forehead, and above your eyes. In healthy paranasal sinuses, mucus is able to drain out, and air is able to circulate through them by way of your nose. However, when your paranasal sinuses are inflamed, mucus and air can become trapped. This can allow bacteria and other germs to grow and cause infection.  Sinusitis can develop quickly and last only a short time (acute) or continue over a long period (chronic). Sinusitis that lasts for more than 12 weeks is considered chronic.  CAUSES  Causes of sinusitis include:  · Allergies.  · Structural abnormalities, such as displacement of the cartilage that separates your nostrils (deviated septum), which can decrease the air flow through your nose and sinuses and affect sinus drainage.  · Functional abnormalities, such as when the small hairs (cilia) that line your sinuses and help remove mucus do not work properly or are not present.  SIGNS AND SYMPTOMS  Symptoms of acute and chronic sinusitis are the same. The primary symptoms are pain and pressure around the affected sinuses. Other  symptoms include:  · Upper toothache.  · Earache.  · Headache.  · Bad breath.  · Decreased sense of smell and taste.  · A cough, which worsens when you are lying flat.  · Fatigue.  · Fever.  · Thick drainage from your nose, which often is green and may contain pus (purulent).  · Swelling and warmth over the affected sinuses.  DIAGNOSIS  Your health care provider will perform a physical exam. During your exam, your health care provider may perform any of the following to help determine if you have acute sinusitis or chronic sinusitis:  · Look in your nose for signs of abnormal growths in your nostrils (nasal polyps).  · Tap over the affected sinus to check for signs of infection.  · View the inside of your sinuses using an imaging device that has a light attached (endoscope).  If your health care provider suspects that you have chronic sinusitis, one or more of the following tests may be recommended:  · Allergy tests.  · Nasal culture. A sample of mucus is taken from your nose, sent to a lab, and screened for bacteria.  · Nasal cytology. A sample of mucus is taken from your nose and examined by your health care provider to determine if your sinusitis is related to an allergy.  TREATMENT  Most cases of acute sinusitis are related to a viral infection and will resolve on their own within 10 days. Sometimes, medicines are prescribed to help relieve symptoms of both acute and chronic sinusitis. These may include pain medicines, decongestants, nasal steroid sprays, or saline sprays.  However, for sinusitis related to a bacterial infection, your health care provider will prescribe antibiotic medicines. These are medicines that will help kill the bacteria causing the infection.  Rarely, sinusitis is caused by a fungal infection. In these cases, your health care provider will prescribe antifungal medicine.  For some cases of chronic sinusitis, surgery is needed. Generally, these are cases in which sinusitis recurs more than  3 times per year, despite other treatments.  HOME CARE INSTRUCTIONS  · Drink plenty of water. Water helps thin the mucus so your sinuses can drain more easily.  · Use a humidifier.  · Inhale steam 3-4 times a day (for example, sit in the bathroom with the shower running).  · Apply a warm, moist washcloth to your face 3-4 times a day, or as directed by your health care provider.  · Use saline nasal sprays to help moisten and clean your sinuses.  · Take medicines only as directed by your health care provider.  · If you were prescribed either an antibiotic or antifungal medicine, finish it all even if you start to feel better.  SEEK IMMEDIATE MEDICAL CARE IF:  · You have increasing pain or severe headaches.  · You have nausea, vomiting, or drowsiness.  · You have swelling around your face.  · You have vision problems.  · You have a stiff neck.  · You have difficulty breathing.     This information is not intended to replace advice given to you by your health care provider. Make sure you discuss any questions you have with your health care provider.     Document Released: 12/18/2006 Document Revised: 01/08/2016 Document Reviewed: 01/01/2013  [employer] Interactive Patient Education ©2016 [employer]

## 2017-03-04 NOTE — ED NOTES
Discharge instructions given. All questions answered. Prescriptions given x 4. Pt verbalizing understanding. All belongings with pt. Pt ambulated to lobby.

## 2017-03-04 NOTE — ED AVS SNAPSHOT
Home Care Instructions                                                                                                                Nancy Olvera   MRN: [id number]    Department:  [facility], Emergency Dept   Date of Visit:  3/4/2017            [facility], Emergency Dept    [address] [address]    Phone:  [phone]      You were seen by     Angélica Padilla M.D.      Your Diagnosis Was     Acute non-recurrent maxillary sinusitis     J01.00       Follow-up Information     1. [facility] with [facility].    Why:  keep appointment next week    Contact information    [address] [place] [id number] [phone]    Additional information:    [facility]      Medication Information     Review all of your home medications and newly ordered medications with your primary doctor and/or pharmacist as soon as possible. Follow medication instructions as directed by your doctor and/or pharmacist.     Please keep your complete medication list with you and share with your physician. Update the information when medications are discontinued, doses are changed, or new medications (including over-the-counter products) are added; and carry medication information at all times in the event of emergency situations.               Medication List      START taking these medications        Instructions    albuterol 2.5mg/3ml Nebu solution for nebulization   Commonly known as:  PROVENTIL    3 mL by Nebulization route every four hours as needed for Shortness of Breath.   Dose:  2.5 mg       cefdinir 300 MG Caps   Commonly known as:  OMNICEF    Take 1 Cap by mouth 2 times a day.   Dose:  300 mg       nicotine 14 MG/24HR Pt24   Commonly known as:  NICODERM    Apply 1 Patch to skin as directed every 24 hours.   Dose:  1 Patch         ASK your doctor about these medications        Instructions    clonazepam 1 MG Tabs   Commonly known as:  KLONOPIN    Take 0.5  mg by mouth 3 times a day.   Dose:  0.5 mg       divalproex 250 MG Tbec   Commonly known as:  DEPAKOTE    Take 250-750 mg by mouth See Admin Instructions. 250 mg in morning 750 mg at bedtime   Dose:  250-750 mg       * hydrocodone-acetaminophen 5-325 MG Tabs per tablet   What changed:  Another medication with the same name was added. Make sure you understand how and when to take each.   Commonly known as:  NORCO   Ask about: Which instructions should I use?    Take 1-2 Tabs by mouth every four hours as needed.   Dose:  1-2 Tab       * hydrocodone-acetaminophen 5-325 MG Tabs per tablet   What changed:  You were already taking a medication with the same name, and this prescription was added. Make sure you understand how and when to take each.   Commonly known as:  NORCO   Ask about: Which instructions should I use?    Take 1-2 Tabs by mouth every four hours as needed.   Dose:  1-2 Tab       * levothyroxine 75 MCG Tabs   Commonly known as:  SYNTHROID    Take 75 mcg by mouth Every morning on an empty stomach.   Dose:  75 mcg       * levothyroxine 75 MCG Tabs   Commonly known as:  SYNTHROID    Take 1 Tab by mouth Every morning on an empty stomach.   Dose:  75 mcg       omeprazole 40 MG delayed-release capsule   Commonly known as:  PRILOSEC    Take 40 mg by mouth every day.   Dose:  40 mg       ondansetron 4 MG Tbdp   Commonly known as:  ZOFRAN ODT    Take 4 mg by mouth every 8 hours as needed for Nausea/Vomiting (filled on 08/19/16).   Dose:  4 mg       oxycodone-acetaminophen 5-325 MG Tabs   Commonly known as:  PERCOCET    Take 1-2 Tabs by mouth every four hours as needed (pain).   Dose:  1-2 Tab       propranolol 10 MG Tabs   Commonly known as:  INDERAL    Take 10 mg by mouth 2 times a day.   Dose:  10 mg       sucralfate 1 GM Tabs   Commonly known as:  CARAFATE    Take 1 g by mouth 4 Times a Day,Before Meals and at Bedtime.   Dose:  1 g       * Notice:  This list has 4 medication(s) that are the same as other  medications prescribed for you. Read the directions carefully, and ask your doctor or other care provider to review them with you.              Discharge Instructions       Back Exercises  Back exercises help treat and prevent back injuries. The goal is to increase your strength in your belly (abdominal) and back muscles. These exercises can also help with flexibility. Start these exercises when told by your doctor.  HOME CARE  Back exercises include:  Pelvic Tilt.  · Lie on your back with your knees bent. Tilt your pelvis until the lower part of your back is against the floor. Hold this position 5 to 10 sec. Repeat this exercise 5 to 10 times.  Knee to Chest.  · Pull 1 knee up against your chest and hold for 20 to 30 seconds. Repeat this with the other knee. This may be done with the other leg straight or bent, whichever feels better. Then, pull both knees up against your chest.  Sit-Ups or Curl-Ups.  · Bend your knees 90 degrees. Start with tilting your pelvis, and do a partial, slow sit-up. Only lift your upper half 30 to 45 degrees off the floor. Take at least 2 to 3 seonds for each sit-up. Do not do sit-ups with your knees out straight. If partial sit-ups are difficult, simply do the above but with only tightening your belly (abdominal) muscles and holding it as told.  Hip-Lift.  · Lie on your back with your knees flexed 90 degrees. Push down with your feet and shoulders as you raise your hips 2 inches off the floor. Hold for 10 seconds, repeat 5 to 10 times.  Back Arches.  · Lie on your stomach. Prop yourself up on bent elbows. Slowly press on your hands, causing an arch in your low back. Repeat 3 to 5 times.  Shoulder-Lifts.  · Lie face down with arms beside your body. Keep hips and belly pressed to floor as you slowly lift your head and shoulders off the floor.  Do not overdo your exercises. Be careful in the beginning. Exercises may cause you some mild back discomfort. If the pain lasts for more than 15  minutes, stop the exercises until you see your doctor. Improvement with exercise for back problems is slow.      This information is not intended to replace advice given to you by your health care provider. Make sure you discuss any questions you have with your health care provider.     Document Released: 01/20/2012 Document Revised: 03/11/2013 Document Reviewed: 02/11/2016  [employer] Interactive Patient Education ©2016 [employer] Inc.    Back Pain, Adult  Back pain is very common. The pain often gets better over time. The cause of back pain is usually not dangerous. Most people can learn to manage their back pain on their own.   HOME CARE   Watch your back pain for any changes. The following actions may help to lessen any pain you are feeling:  · Stay active. Start with short walks on flat ground if you can. Try to walk farther each day.  · Exercise regularly as told by your doctor. Exercise helps your back heal faster. It also helps avoid future injury by keeping your muscles strong and flexible.  · Do not sit, drive, or  one place for more than 30 minutes.  · Do not stay in bed. Resting more than 1-2 days can slow down your recovery.  · Be careful when you bend or lift an object. Use good form when lifting:  · Bend at your knees.  · Keep the object close to your body.  · Do not twist.  · Sleep on a firm mattress. Lie on your side, and bend your knees. If you lie on your back, put a pillow under your knees.  · Take medicines only as told by your doctor.  · Put ice on the injured area.  · Put ice in a plastic bag.  · Place a towel between your skin and the bag.  · Leave the ice on for 20 minutes, 2-3 times a day for the first 2-3 days. After that, you can switch between ice and heat packs.  · Avoid feeling anxious or stressed. Find good ways to deal with stress, such as exercise.  · Maintain a healthy weight. Extra weight puts stress on your back.  GET HELP IF:   · You have pain that does not go away with rest  or medicine.  · You have worsening pain that goes down into your legs or buttocks.  · You have pain that does not get better in one week.  · You have pain at night.  · You lose weight.  · You have a fever or chills.  GET HELP RIGHT AWAY IF:   · You cannot control when you poop (bowel movement) or pee (urinate).  · Your arms or legs feel weak.  · Your arms or legs lose feeling (numbness).  · You feel sick to your stomach (nauseous) or throw up (vomit).  · You have belly (abdominal) pain.  · You feel like you may pass out (faint).     This information is not intended to replace advice given to you by your health care provider. Make sure you discuss any questions you have with your health care provider.     Document Released: 06/05/2009 Document Revised: 01/08/2016 Document Reviewed: 04/21/2015  [employer] Interactive Patient Education ©2016 [employer]    Sinusitis, Adult  Sinusitis is redness, soreness, and inflammation of the paranasal sinuses. Paranasal sinuses are air pockets within the bones of your face. They are located beneath your eyes, in the middle of your forehead, and above your eyes. In healthy paranasal sinuses, mucus is able to drain out, and air is able to circulate through them by way of your nose. However, when your paranasal sinuses are inflamed, mucus and air can become trapped. This can allow bacteria and other germs to grow and cause infection.  Sinusitis can develop quickly and last only a short time (acute) or continue over a long period (chronic). Sinusitis that lasts for more than 12 weeks is considered chronic.  CAUSES  Causes of sinusitis include:  · Allergies.  · Structural abnormalities, such as displacement of the cartilage that separates your nostrils (deviated septum), which can decrease the air flow through your nose and sinuses and affect sinus drainage.  · Functional abnormalities, such as when the small hairs (cilia) that line your sinuses and help remove mucus do not work  properly or are not present.  SIGNS AND SYMPTOMS  Symptoms of acute and chronic sinusitis are the same. The primary symptoms are pain and pressure around the affected sinuses. Other symptoms include:  · Upper toothache.  · Earache.  · Headache.  · Bad breath.  · Decreased sense of smell and taste.  · A cough, which worsens when you are lying flat.  · Fatigue.  · Fever.  · Thick drainage from your nose, which often is green and may contain pus (purulent).  · Swelling and warmth over the affected sinuses.  DIAGNOSIS  Your health care provider will perform a physical exam. During your exam, your health care provider may perform any of the following to help determine if you have acute sinusitis or chronic sinusitis:  · Look in your nose for signs of abnormal growths in your nostrils (nasal polyps).  · Tap over the affected sinus to check for signs of infection.  · View the inside of your sinuses using an imaging device that has a light attached (endoscope).  If your health care provider suspects that you have chronic sinusitis, one or more of the following tests may be recommended:  · Allergy tests.  · Nasal culture. A sample of mucus is taken from your nose, sent to a lab, and screened for bacteria.  · Nasal cytology. A sample of mucus is taken from your nose and examined by your health care provider to determine if your sinusitis is related to an allergy.  TREATMENT  Most cases of acute sinusitis are related to a viral infection and will resolve on their own within 10 days. Sometimes, medicines are prescribed to help relieve symptoms of both acute and chronic sinusitis. These may include pain medicines, decongestants, nasal steroid sprays, or saline sprays.  However, for sinusitis related to a bacterial infection, your health care provider will prescribe antibiotic medicines. These are medicines that will help kill the bacteria causing the infection.  Rarely, sinusitis is caused by a fungal infection. In these cases,  your health care provider will prescribe antifungal medicine.  For some cases of chronic sinusitis, surgery is needed. Generally, these are cases in which sinusitis recurs more than 3 times per year, despite other treatments.  HOME CARE INSTRUCTIONS  · Drink plenty of water. Water helps thin the mucus so your sinuses can drain more easily.  · Use a humidifier.  · Inhale steam 3-4 times a day (for example, sit in the bathroom with the shower running).  · Apply a warm, moist washcloth to your face 3-4 times a day, or as directed by your health care provider.  · Use saline nasal sprays to help moisten and clean your sinuses.  · Take medicines only as directed by your health care provider.  · If you were prescribed either an antibiotic or antifungal medicine, finish it all even if you start to feel better.  SEEK IMMEDIATE MEDICAL CARE IF:  · You have increasing pain or severe headaches.  · You have nausea, vomiting, or drowsiness.  · You have swelling around your face.  · You have vision problems.  · You have a stiff neck.  · You have difficulty breathing.     This information is not intended to replace advice given to you by your health care provider. Make sure you discuss any questions you have with your health care provider.     Document Released: 12/18/2006 Document Revised: 01/08/2016 Document Reviewed: 01/01/2013  Elsevier Interactive Patient Education ©2016 [employer]            Patient Information     Patient Information    Following emergency treatment: all patient requiring follow-up care must return either to a private physician or a clinic if your condition worsens before you are able to obtain further medical attention, please return to the emergency room.     Billing Information    At [facility], we work to make the billing process streamlined for our patients.  Our Representatives are here to answer any questions you may have regarding your hospital bill.  If you have insurance coverage and have  supplied your insurance information to us, we will submit a claim to your insurer on your behalf.  Should you have any questions regarding your bill, we can be reached online or by phone as follows:  Online: You are able pay your bills online or live chat with our representatives about any billing questions you may have. We are here to help Monday - Friday from 8:00am to 7:30pm and 9:00am - 12:00pm on Saturdays.  Please visit [email or web address]  for more information.   Phone:  [phone] or [phone]    Please note that your emergency physician, surgeon, pathologist, radiologist, anesthesiologist, and other specialists are not employed by [facility] and will therefore bill separately for their services.  Please contact them directly for any questions concerning their bills at the numbers below:     Emergency Physician Services:  [phone]  [facility]:  [phone]  [facility]:  [phone]  [facility]:  [phone]    1. Your final bill may vary from the amount quoted upon discharge if all procedures are not complete at that time, or if your doctor has additional procedures of which we are not aware. You will receive an additional bill if you return to the Emergency Department at [facility] for suture removal regardless of the facility of which the sutures were placed.     2. Please arrange for settlement of this account at the emergency registration.    3. All self-pay accounts are due in full at the time of treatment.  If you are unable to meet this obligation then payment is expected within 4-5 days.     4. If you have had radiology studies (CT, X-ray, Ultrasound, MRI), you have received a preliminary result during your emergency department visit. Please contact the radiology department [phone] to receive a copy of your final result. Please discuss the Final result with your primary physician or with the  follow up physician provided.     Crisis Hotline:  [place] Crisis Hotline:  1-246-ZJVTUYY or 1-238.319.5608  [place] Crisis Hotline:    1-163.411.9775 or 226-627-6276         ED Discharge Follow Up Questions    1. In order to provide you with very good care, we would like to follow up with a phone call in the next few days.  May we have your permission to contact you?     YES /  NO    2. What is the best phone number to call you? (       )_____-__________    3. What is the best time to call you?      Morning  /  Afternoon  /  Evening                   Patient Signature:  ____________________________________________________________    Date:  ____________________________________________________________

## 2017-03-04 NOTE — ED AVS SNAPSHOT
3/4/2017          Nancy Olvera  [address]  [place] [address]    Dear Nancy:    [facility] wants to ensure your discharge home is safe and you or your loved ones have had all your questions answered regarding your care after you leave the hospital.    You may receive a telephone call within two days of your discharge.  This call is to make certain you understand your discharge instructions as well as ensure we provided you with the best care possible during your stay with us.     The call will only last approximately 3-5 minutes and will be done by a nurse.    Once again, we want to ensure your discharge home is safe and that you have a clear understanding of any next steps in your care.  If you have any questions or concerns, please do not hesitate to contact us, we are here for you.  Thank you for choosing [facility] for your healthcare needs.    Sincerely,    True Stewart    [facility]

## 2017-03-04 NOTE — ED AVS SNAPSHOT
[employer] Access Code: [email or web address]  Expires: 3/26/2017 10:41 AM    Your email address is not on file at [employer].  Email Addresses are required for you to sign up for [employer], please contact [phone] to verify your personal information and to provide your email address prior to attempting to register for [employer].    Nancy Olvera  [address]  [place] [address]    [employer]  A secure, online tool to manage your health information     [employer]’s [employer]® is a secure, online tool that connects you to your personalized health information from the privacy of your home -- day or night - making it very easy for you to manage your healthcare. Once the activation process is completed, you can even access your medical information using the [employer] chrissie, which is available for free in the Apple Chrissie store or Google Play store.     To learn more about [employer], visit [email or web address]    There are two levels of access available (as shown below):   My Chart Features  [facility] Primary Care Doctor [facility]  Specialists [facility]  Urgent  Care Non-[facility] Primary Care Doctor   Email your healthcare team securely and privately 24/7 X X X    Manage appointments: schedule your next appointment; view details of past/upcoming appointments X      Request prescription refills. X      View recent personal medical records, including lab and immunizations X X X X   View health record, including health history, allergies, medications X X X X   Read reports about your outpatient visits, procedures, consult and ER notes X X X X   See your discharge summary, which is a recap of your hospital and/or ER visit that includes your diagnosis, lab results, and care plan X X  X     How to register for [employer]:  Once your e-mail address has been verified, follow the following steps to sign up for [employer].     1. Go to  [email or web address]  2. Click on the Sign Up Now box, which takes you to the New Member Sign Up page. You will need  to provide the following information:  a. Enter your [employer] Access Code exactly as it appears at the top of this page. (You will not need to use this code after you’ve completed the sign-up process. If you do not sign up before the expiration date, you must request a new code.)   b. Enter your date of birth.   c. Enter your home email address.   d. Click Submit, and follow the next screen’s instructions.  3. Create a [employer] ID. This will be your [employer] login ID and cannot be changed, so think of one that is secure and easy to remember.  4. Create a [employer] password. You can change your password at any time.  5. Enter your Password Reset Question and Answer. This can be used at a later time if you forget your password.   6. Enter your e-mail address. This allows you to receive e-mail notifications when new information is available in [employer].  7. Click Sign Up. You can now view your health information.    For assistance activating your [employer] account, call [phone]

## 2017-03-13 ENCOUNTER — HOSPITAL ENCOUNTER (EMERGENCY)
Facility: MEDICAL CENTER | Age: 42
End: 2017-03-13
Attending: EMERGENCY MEDICINE
Payer: MEDICARE

## 2017-03-13 VITALS
HEIGHT: 68 IN | WEIGHT: 155.42 LBS | HEART RATE: 72 BPM | BODY MASS INDEX: 23.56 KG/M2 | SYSTOLIC BLOOD PRESSURE: 114 MMHG | DIASTOLIC BLOOD PRESSURE: 70 MMHG | RESPIRATION RATE: 18 BRPM | OXYGEN SATURATION: 100 % | TEMPERATURE: 97.7 F

## 2017-03-13 DIAGNOSIS — R53.1 WEAKNESS: ICD-10-CM

## 2017-03-13 DIAGNOSIS — M54.5 CHRONIC LOW BACK PAIN, UNSPECIFIED BACK PAIN LATERALITY, WITH SCIATICA PRESENCE UNSPECIFIED: ICD-10-CM

## 2017-03-13 DIAGNOSIS — M54.50 ACUTE LEFT-SIDED LOW BACK PAIN WITHOUT SCIATICA: ICD-10-CM

## 2017-03-13 DIAGNOSIS — G89.29 CHRONIC LOW BACK PAIN, UNSPECIFIED BACK PAIN LATERALITY, WITH SCIATICA PRESENCE UNSPECIFIED: ICD-10-CM

## 2017-03-13 PROCEDURE — A9270 NON-COVERED ITEM OR SERVICE: HCPCS | Performed by: EMERGENCY MEDICINE

## 2017-03-13 PROCEDURE — 700102 HCHG RX REV CODE 250 W/ 637 OVERRIDE(OP): Performed by: EMERGENCY MEDICINE

## 2017-03-13 PROCEDURE — 99284 EMERGENCY DEPT VISIT MOD MDM: CPT

## 2017-03-13 PROCEDURE — 700111 HCHG RX REV CODE 636 W/ 250 OVERRIDE (IP)

## 2017-03-13 RX ORDER — METHYLPREDNISOLONE 4 MG/1
TABLET ORAL
Qty: 1 KIT | Refills: 0 | Status: SHIPPED | OUTPATIENT
Start: 2017-03-13 | End: 2017-10-14

## 2017-03-13 RX ORDER — OXYCODONE HYDROCHLORIDE AND ACETAMINOPHEN 5; 325 MG/1; MG/1
1-2 TABLET ORAL EVERY 6 HOURS PRN
Qty: 10 TAB | Refills: 0 | Status: SHIPPED | OUTPATIENT
Start: 2017-03-13 | End: 2017-10-14

## 2017-03-13 RX ORDER — ONDANSETRON 4 MG/1
TABLET, ORALLY DISINTEGRATING ORAL
Status: COMPLETED
Start: 2017-03-13 | End: 2017-03-13

## 2017-03-13 RX ORDER — ONDANSETRON 4 MG/1
4 TABLET, ORALLY DISINTEGRATING ORAL ONCE
Status: COMPLETED | OUTPATIENT
Start: 2017-03-13 | End: 2017-03-13

## 2017-03-13 RX ORDER — OXYCODONE HYDROCHLORIDE AND ACETAMINOPHEN 5; 325 MG/1; MG/1
2 TABLET ORAL ONCE
Status: COMPLETED | OUTPATIENT
Start: 2017-03-13 | End: 2017-03-13

## 2017-03-13 RX ADMIN — OXYCODONE AND ACETAMINOPHEN 2 TABLET: 5; 325 TABLET ORAL at 14:35

## 2017-03-13 RX ADMIN — ONDANSETRON 4 MG: 4 TABLET, ORALLY DISINTEGRATING ORAL at 14:56
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
## 2017-03-13 NOTE — ED AVS SNAPSHOT
Home Care Instructions                                                                                                                Nancy Olvera   MRN: [id number]    Department:  [facility], Emergency Dept   Date of Visit:  3/13/2017            [facility], Emergency Dept    [address]    [place] [address]    Phone:  [phone]      You were seen by     Donnell Mancera M.D.      Your Diagnosis Was     Acute left-sided low back pain without sciatica     M54.5       These are the medications you received during your hospitalization from 03/13/2017 1311 to 03/13/2017 1514     Date/Time Order Dose Route Action    03/13/2017 1435 oxycodone-acetaminophen (PERCOCET) 5-325 MG per tablet 2 Tab 2 Tab Oral Given    03/13/2017 1456 ondansetron (ZOFRAN ODT) dispertab 4 mg 4 mg Oral Given      Follow-up Information     1. Follow up with Aj Lawrence M.D.. Schedule an appointment as soon as possible for a visit in 1 week.    Specialty:  Neurosurgery    Contact information    [address]  [address]  [place] [id number] [phone]        Medication Information     Review all of your home medications and newly ordered medications with your primary doctor and/or pharmacist as soon as possible. Follow medication instructions as directed by your doctor and/or pharmacist.     Please keep your complete medication list with you and share with your physician. Update the information when medications are discontinued, doses are changed, or new medications (including over-the-counter products) are added; and carry medication information at all times in the event of emergency situations.               Medication List      START taking these medications        Instructions    Morning Afternoon Evening Bedtime    MethylPREDNISolone 4 MG Tbpk   Commonly known as:  MEDROL DOSEPAK        Sig: Per instructions on pack                          ASK your doctor about these medications        Instructions      Morning Afternoon Evening Bedtime    albuterol 2.5mg/3ml Nebu solution for nebulization   Commonly known as:  PROVENTIL        3 mL by Nebulization route every four hours as needed for Shortness of Breath.   Dose:  2.5 mg                        cefdinir 300 MG Caps   Commonly known as:  OMNICEF        Take 1 Cap by mouth 2 times a day.   Dose:  300 mg                        clonazepam 1 MG Tabs   Commonly known as:  KLONOPIN        Take 0.5 mg by mouth 3 times a day.   Dose:  0.5 mg                        divalproex 250 MG Tbec   Commonly known as:  DEPAKOTE        Take 250-750 mg by mouth See Admin Instructions. 250 mg in morning 750 mg at bedtime   Dose:  250-750 mg                        * hydrocodone-acetaminophen 5-325 MG Tabs per tablet   Commonly known as:  NORCO        Take 1-2 Tabs by mouth every four hours as needed.   Dose:  1-2 Tab                        * hydrocodone-acetaminophen 5-325 MG Tabs per tablet   Commonly known as:  NORCO        Take 1-2 Tabs by mouth every four hours as needed.   Dose:  1-2 Tab                        * levothyroxine 75 MCG Tabs   Commonly known as:  SYNTHROID        Take 75 mcg by mouth Every morning on an empty stomach.   Dose:  75 mcg                        * levothyroxine 75 MCG Tabs   Commonly known as:  SYNTHROID        Take 1 Tab by mouth Every morning on an empty stomach.   Dose:  75 mcg                        nicotine 14 MG/24HR Pt24   Commonly known as:  NICODERM        Apply 1 Patch to skin as directed every 24 hours.   Dose:  1 Patch                        omeprazole 40 MG delayed-release capsule   Commonly known as:  PRILOSEC        Take 40 mg by mouth every day.   Dose:  40 mg                        ondansetron 4 MG Tbdp   Last time this was given:  4 mg on 3/13/2017  2:56 PM   Commonly known as:  ZOFRAN ODT        Take 4 mg by mouth every 8 hours as needed for Nausea/Vomiting (filled on 08/19/16).   Dose:  4 mg                        *  oxycodone-acetaminophen 5-325 MG Tabs   What changed:  Another medication with the same name was added. Make sure you understand how and when to take each.   Last time this was given:  2 Tabs on 3/13/2017  2:35 PM   Commonly known as:  PERCOCET   Ask about: Which instructions should I use?        Take 1-2 Tabs by mouth every four hours as needed (pain).   Dose:  1-2 Tab                        * oxycodone-acetaminophen 5-325 MG Tabs   What changed:  You were already taking a medication with the same name, and this prescription was added. Make sure you understand how and when to take each.   Last time this was given:  2 Tabs on 3/13/2017  2:35 PM   Commonly known as:  PERCOCET   Ask about: Which instructions should I use?        Take 1-2 Tabs by mouth every 6 hours as needed (moderate to severe pain).   Dose:  1-2 Tab                        propranolol 10 MG Tabs   Commonly known as:  INDERAL        Take 10 mg by mouth 2 times a day.   Dose:  10 mg                        sucralfate 1 GM Tabs   Commonly known as:  CARAFATE        Take 1 g by mouth 4 Times a Day,Before Meals and at Bedtime.   Dose:  1 g                        * Notice:  This list has 6 medication(s) that are the same as other medications prescribed for you. Read the directions carefully, and ask your doctor or other care provider to review them with you.         Where to Get Your Medications      You can get these medications from any pharmacy     Bring a paper prescription for each of these medications    - MethylPREDNISolone 4 MG Tbpk  - oxycodone-acetaminophen 5-325 MG Tabs              Discharge Instructions         Back Pain & Injury    Take Medrol Dosepak as an anti-inflammatory and Percocet for pain. Follow-up with MRI and with Dr. Lawrence neurosurgery as instructed.  Followup if not better 7-10 days.  Return for weakness or fever.  Avoid lifting more than 10 pounds and avoid bending.  Avoid strict bedrest.    Your back pain is most likely caused  by a strain of the muscles or ligaments that support the spine.  This is a  common injury. Back strains cause pain and trouble moving because of muscle spasms. They may take several weeks to heal, although they are usually much better after 2-3 days.     Your spinal column (backbone) is made up of 24 main vertebral bodies in addition to the sacrum and coccyx. These are held together by tough fibrous tissue called ligaments, and also by the support of your muscles. Nerve roots pass through the openings between the vertebrae. A sudden wrenching move or injury to the back may cause injury to, or pressure upon these nerves. This may result in localized back pain or radiation (movement) of pain into the buttocks and down the leg into the foot. The condition known as sciatica is frequently associated with a ruptured (herniated) disc. Pain is also created by muscle spasm alone.    Treatment for back pain includes:  l Rest - Get bed rest as needed over the next day or two.  Use a firm mattress and lie on your side with your knees slightly bent. If you lie on your back, put a pillow under your knees.  Use bed rest for only the most extreme, acute (sudden) episode. Prolonged bed rest over 48 hours will aggravate your condition.  l Early movement - Back pain improves most rapidly if you remain active. It is much more stressful on the back to sit or  one place than it is to move around.  Do not sit, drive or  one place for more than 30 minutes at a time.  Take short walks on level surfaces as soon as pain will allow.  l Limit bending and lifting - Don't bend over or lift anything over 20 pounds until you are completely better. Learn to lift by bending your knees and using your leg muscles to help. Keep the load close to your body and avoid twisting, reaching and overhead work.     l Medicines - Medicine to reduce pain and inflammation are helpful and muscle-relaxing drugs may also be prescribed.  l Therapy - Ice  used for acute conditions is very effective. Use a large plastic bag filled with ice and wrapped in a towel. This also provides excellent pain relief. This may be continuous or for thirty minutes every two hours during acute phase, then as needed. Heat for thirty minutes prior to activities is helpful.  Back exercises and gentle massage may be of some benefit.You should be examined again if your back pain is not better in one week.     TO PREVENT:  Avoid an underactive life style. Active exercise, as directed by your caregiver, is your greatest weapon against back pain. Hard physical activities such as tennis, racquetball, water skiing etc., without proper physical conditioning may aggravate and/or create problems, especially if you are not in condition for that activity. If you have a back problem it is especially important to avoid sports requiring sudden body movements. Swimming and walking are generally safer activities. Maintain good posture. Avoid obesity.    See your caregiver for continued problems. Your caregiver can help or refer you for appropriate exercises and work hardening. Work hardening means that the back is put through the proper exercises and rehabilitation to treat the present problems and prevent future problems. With conditioning, most back problems can be avoided. Sometimes a more serious issue may be the cause of back pain and you should be seen immediately again if new problems seem to be developing.    seek immediate medical attention if:  Ø Numbness, tingling, weakness, or problem with the use of your arms or legs.  Ø Severe back pain not relieved with medications.  Ø Change in bowel or bladder control.  Ø Increasing pain in any areas of the body.  Ø Shortness of breath, dizziness or fainting.  Ø Nausea (feeling sick to your stomach), vomiting or sweats.  Ø Fever above 101º    ExitCare® Patient Information ©2007 [employer].              Patient Information     Patient  Information    Following emergency treatment: all patient requiring follow-up care must return either to a private physician or a clinic if your condition worsens before you are able to obtain further medical attention, please return to the emergency room.     Billing Information    At [facility], we work to make the billing process streamlined for our patients.  Our Representatives are here to answer any questions you may have regarding your hospital bill.  If you have insurance coverage and have supplied your insurance information to us, we will submit a claim to your insurer on your behalf.  Should you have any questions regarding your bill, we can be reached online or by phone as follows:  Online: You are able pay your bills online or live chat with our representatives about any billing questions you may have. We are here to help Monday - Friday from 8:00am to 7:30pm and 9:00am - 12:00pm on Saturdays.  Please visit [email or web address]  for more information.   Phone:  [phone] or [phone]    Please note that your emergency physician, surgeon, pathologist, radiologist, anesthesiologist, and other specialists are not employed by [facility] and will therefore bill separately for their services.  Please contact them directly for any questions concerning their bills at the numbers below:     Emergency Physician Services:  [phone]  [facility]:  [phone]  [facility]:  [phone]  [facility]:  [phone]    1. Your final bill may vary from the amount quoted upon discharge if all procedures are not complete at that time, or if your doctor has additional procedures of which we are not aware. You will receive an additional bill if you return to the Emergency Department at [facility] for suture removal regardless of the facility of which the sutures were placed.     2. Please arrange for settlement of this account  at the emergency registration.    3. All self-pay accounts are due in full at the time of treatment.  If you are unable to meet this obligation then payment is expected within 4-5 days.     4. If you have had radiology studies (CT, X-ray, Ultrasound, MRI), you have received a preliminary result during your emergency department visit. Please contact the radiology department [phone] to receive a copy of your final result. Please discuss the Final result with your primary physician or with the follow up physician provided.     Crisis Hotline:  [place] Crisis Hotline:  0-345-OEOOTTE or 1-548.566.7793  [place] Crisis Hotline:    1-200.325.7404 or 606-386-5423         ED Discharge Follow Up Questions    1. In order to provide you with very good care, we would like to follow up with a phone call in the next few days.  May we have your permission to contact you?     YES /  NO    2. What is the best phone number to call you? (       )_____-__________    3. What is the best time to call you?      Morning  /  Afternoon  /  Evening                   Patient Signature:  ____________________________________________________________    Date:  ____________________________________________________________      Your appointments     Mar 16, 2017  9:00 AM   MR SP WO 30 with DOUBLE R MRI 1   IMAGING DOUBLE R. (Double R)    [address]  [facility] [id number] [phone]
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
## 2017-03-13 NOTE — ED AVS SNAPSHOT
[email or web address].is Access Code: [email or web address]  Expires: 3/26/2017 11:41 AM    Your email address is not on file at [facility].  Email Addresses are required for you to sign up for [email or web address].is, please contact [phone] to verify your personal information and to provide your email address prior to attempting to register for Elephant.is.    Nancy Olvera  [address]  [place] [address]    Elephant.is  A secure, online tool to manage your health information     [facility]’s Elephant.is® is a secure, online tool that connects you to your personalized health information from the privacy of your home -- day or night - making it very easy for you to manage your healthcare. Once the activation process is completed, you can even access your medical information using the Elephant.is chrissie, which is available for free in the Apple Chrissie store or Google Play store.     To learn more about Elephant.is, visit [email or web address]    There are two levels of access available (as shown below):   My Chart Features  [facility] Primary Care Doctor [facility]  Specialists [facility]  Urgent  Care Non-[facility] Primary Care Doctor   Email your healthcare team securely and privately 24/7 X X X    Manage appointments: schedule your next appointment; view details of past/upcoming appointments X      Request prescription refills. X      View recent personal medical records, including lab and immunizations X X X X   View health record, including health history, allergies, medications X X X X   Read reports about your outpatient visits, procedures, consult and ER notes X X X X   See your discharge summary, which is a recap of your hospital and/or ER visit that includes your diagnosis, lab results, and care plan X X  X     How to register for Musikkit:  Once your e-mail address has been verified, follow the following steps to sign up for Musikkit.     1. Go to  [email or web address]  2. Click on the Sign Up Now box, which takes you to the New Member Sign Up page. You will need  to provide the following information:  a. Enter your Precog Access Code exactly as it appears at the top of this page. (You will not need to use this code after you’ve completed the sign-up process. If you do not sign up before the expiration date, you must request a new code.)   b. Enter your date of birth.   c. Enter your home email address.   d. Click Submit, and follow the next screen’s instructions.  3. Create a Precog ID. This will be your Precog login ID and cannot be changed, so think of one that is secure and easy to remember.  4. Create a Precog password. You can change your password at any time.  5. Enter your Password Reset Question and Answer. This can be used at a later time if you forget your password.   6. Enter your e-mail address. This allows you to receive e-mail notifications when new information is available in Precog.  7. Click Sign Up. You can now view your health information.    For assistance activating your Precog account, call [phone]

## 2017-03-13 NOTE — ED AVS SNAPSHOT
3/13/2017          Nancy Olvera  [address]  [place] [address]    Dear Nancy:    [facility] wants to ensure your discharge home is safe and you or your loved ones have had all your questions answered regarding your care after you leave the hospital.    You may receive a telephone call within two days of your discharge.  This call is to make certain you understand your discharge instructions as well as ensure we provided you with the best care possible during your stay with us.     The call will only last approximately 3-5 minutes and will be done by a nurse.    Once again, we want to ensure your discharge home is safe and that you have a clear understanding of any next steps in your care.  If you have any questions or concerns, please do not hesitate to contact us, we are here for you.  Thank you for choosing [facility] for your healthcare needs.    Sincerely,    True Stewart    [facility]

## 2017-03-13 NOTE — DISCHARGE INSTRUCTIONS
Back Pain & Injury    Take Medrol Dosepak as an anti-inflammatory and Percocet for pain. Follow-up with MRI and with Dr. Lawrence neurosurgery as instructed.  Followup if not better 7-10 days.  Return for weakness or fever.  Avoid lifting more than 10 pounds and avoid bending.  Avoid strict bedrest.    Your back pain is most likely caused by a strain of the muscles or ligaments that support the spine.  This is a  common injury. Back strains cause pain and trouble moving because of muscle spasms. They may take several weeks to heal, although they are usually much better after 2-3 days.     Your spinal column (backbone) is made up of 24 main vertebral bodies in addition to the sacrum and coccyx. These are held together by tough fibrous tissue called ligaments, and also by the support of your muscles. Nerve roots pass through the openings between the vertebrae. A sudden wrenching move or injury to the back may cause injury to, or pressure upon these nerves. This may result in localized back pain or radiation (movement) of pain into the buttocks and down the leg into the foot. The condition known as sciatica is frequently associated with a ruptured (herniated) disc. Pain is also created by muscle spasm alone.    Treatment for back pain includes:  l Rest - Get bed rest as needed over the next day or two.  Use a firm mattress and lie on your side with your knees slightly bent. If you lie on your back, put a pillow under your knees.  Use bed rest for only the most extreme, acute (sudden) episode. Prolonged bed rest over 48 hours will aggravate your condition.  l Early movement - Back pain improves most rapidly if you remain active. It is much more stressful on the back to sit or  one place than it is to move around.  Do not sit, drive or  one place for more than 30 minutes at a time.  Take short walks on level surfaces as soon as pain will allow.  l Limit bending and lifting - Don't bend over or lift  anything over 20 pounds until you are completely better. Learn to lift by bending your knees and using your leg muscles to help. Keep the load close to your body and avoid twisting, reaching and overhead work.     l Medicines - Medicine to reduce pain and inflammation are helpful and muscle-relaxing drugs may also be prescribed.  l Therapy - Ice used for acute conditions is very effective. Use a large plastic bag filled with ice and wrapped in a towel. This also provides excellent pain relief. This may be continuous or for thirty minutes every two hours during acute phase, then as needed. Heat for thirty minutes prior to activities is helpful.  Back exercises and gentle massage may be of some benefit.You should be examined again if your back pain is not better in one week.     TO PREVENT:  Avoid an underactive life style. Active exercise, as directed by your caregiver, is your greatest weapon against back pain. Hard physical activities such as tennis, racquetball, water skiing etc., without proper physical conditioning may aggravate and/or create problems, especially if you are not in condition for that activity. If you have a back problem it is especially important to avoid sports requiring sudden body movements. Swimming and walking are generally safer activities. Maintain good posture. Avoid obesity.    See your caregiver for continued problems. Your caregiver can help or refer you for appropriate exercises and work hardening. Work hardening means that the back is put through the proper exercises and rehabilitation to treat the present problems and prevent future problems. With conditioning, most back problems can be avoided. Sometimes a more serious issue may be the cause of back pain and you should be seen immediately again if new problems seem to be developing.    seek immediate medical attention if:  Ø Numbness, tingling, weakness, or problem with the use of your arms or legs.  Ø Severe back pain not relieved  with medications.  Ø Change in bowel or bladder control.  Ø Increasing pain in any areas of the body.  Ø Shortness of breath, dizziness or fainting.  Ø Nausea (feeling sick to your stomach), vomiting or sweats.  Ø Fever above 101º    [employer]® Patient Information ©2007 [employer].

## 2017-03-16 ENCOUNTER — APPOINTMENT (OUTPATIENT)
Dept: RADIOLOGY | Facility: MEDICAL CENTER | Age: 42
End: 2017-03-16
Attending: EMERGENCY MEDICINE
Payer: MEDICARE

## 2017-03-16 DIAGNOSIS — G89.29 CHRONIC LOW BACK PAIN, UNSPECIFIED BACK PAIN LATERALITY, WITH SCIATICA PRESENCE UNSPECIFIED: ICD-10-CM

## 2017-03-16 DIAGNOSIS — M54.5 CHRONIC LOW BACK PAIN, UNSPECIFIED BACK PAIN LATERALITY, WITH SCIATICA PRESENCE UNSPECIFIED: ICD-10-CM

## 2017-03-16 PROCEDURE — 72148 MRI LUMBAR SPINE W/O DYE: CPT

## 2017-06-19 ENCOUNTER — HOSPITAL ENCOUNTER (EMERGENCY)
Dept: HOSPITAL 8 - ED | Age: 42
Discharge: HOME | End: 2017-06-19
Payer: MEDICARE

## 2017-06-19 VITALS — DIASTOLIC BLOOD PRESSURE: 83 MMHG | SYSTOLIC BLOOD PRESSURE: 134 MMHG

## 2017-06-19 VITALS — WEIGHT: 160.28 LBS | BODY MASS INDEX: 24.29 KG/M2 | HEIGHT: 68 IN

## 2017-06-19 DIAGNOSIS — Z88.1: ICD-10-CM

## 2017-06-19 DIAGNOSIS — E07.9: ICD-10-CM

## 2017-06-19 DIAGNOSIS — Z88.2: ICD-10-CM

## 2017-06-19 DIAGNOSIS — Z88.6: ICD-10-CM

## 2017-06-19 DIAGNOSIS — Z85.038: ICD-10-CM

## 2017-06-19 DIAGNOSIS — M54.16: ICD-10-CM

## 2017-06-19 DIAGNOSIS — M51.26: Primary | ICD-10-CM

## 2017-06-19 DIAGNOSIS — Z88.8: ICD-10-CM

## 2017-06-19 PROCEDURE — 72148 MRI LUMBAR SPINE W/O DYE: CPT

## 2017-06-19 PROCEDURE — 81003 URINALYSIS AUTO W/O SCOPE: CPT

## 2017-06-19 PROCEDURE — 99285 EMERGENCY DEPT VISIT HI MDM: CPT

## 2017-06-19 PROCEDURE — 96372 THER/PROPH/DIAG INJ SC/IM: CPT

## 2017-06-21 ENCOUNTER — HOSPITAL ENCOUNTER (EMERGENCY)
Dept: HOSPITAL 8 - ED | Age: 42
Discharge: HOME | End: 2017-06-21
Payer: MEDICARE

## 2017-06-21 VITALS — BODY MASS INDEX: 25.59 KG/M2 | WEIGHT: 168.87 LBS | HEIGHT: 68 IN

## 2017-06-21 VITALS — SYSTOLIC BLOOD PRESSURE: 108 MMHG | DIASTOLIC BLOOD PRESSURE: 57 MMHG

## 2017-06-21 DIAGNOSIS — Z85.038: ICD-10-CM

## 2017-06-21 DIAGNOSIS — M54.16: Primary | ICD-10-CM

## 2017-06-21 DIAGNOSIS — Z88.1: ICD-10-CM

## 2017-06-21 DIAGNOSIS — F17.200: ICD-10-CM

## 2017-06-21 DIAGNOSIS — Z85.028: ICD-10-CM

## 2017-06-21 DIAGNOSIS — Z88.2: ICD-10-CM

## 2017-06-21 DIAGNOSIS — Z88.8: ICD-10-CM

## 2017-06-21 DIAGNOSIS — Z88.6: ICD-10-CM

## 2017-06-21 LAB — BUN SERPL-MCNC: 18 MG/DL (ref 7–18)

## 2017-06-21 PROCEDURE — 80048 BASIC METABOLIC PNL TOTAL CA: CPT

## 2017-06-21 PROCEDURE — 99285 EMERGENCY DEPT VISIT HI MDM: CPT

## 2017-06-21 PROCEDURE — 96361 HYDRATE IV INFUSION ADD-ON: CPT

## 2017-06-21 PROCEDURE — 96374 THER/PROPH/DIAG INJ IV PUSH: CPT

## 2017-06-21 PROCEDURE — 82040 ASSAY OF SERUM ALBUMIN: CPT

## 2017-06-21 PROCEDURE — 85025 COMPLETE CBC W/AUTO DIFF WBC: CPT

## 2017-06-21 PROCEDURE — 36415 COLL VENOUS BLD VENIPUNCTURE: CPT

## 2017-06-21 PROCEDURE — 96375 TX/PRO/DX INJ NEW DRUG ADDON: CPT

## 2017-06-26 ENCOUNTER — HOSPITAL ENCOUNTER (EMERGENCY)
Dept: HOSPITAL 8 - ED | Age: 42
Discharge: HOME | End: 2017-06-26
Payer: MEDICARE

## 2017-06-26 VITALS — BODY MASS INDEX: 24.26 KG/M2 | HEIGHT: 68 IN | WEIGHT: 160.06 LBS

## 2017-06-26 VITALS — SYSTOLIC BLOOD PRESSURE: 120 MMHG | DIASTOLIC BLOOD PRESSURE: 76 MMHG

## 2017-06-26 DIAGNOSIS — R33.9: ICD-10-CM

## 2017-06-26 DIAGNOSIS — M51.16: Primary | ICD-10-CM

## 2017-06-26 DIAGNOSIS — Z85.028: ICD-10-CM

## 2017-06-26 LAB — BUN SERPL-MCNC: 9 MG/DL (ref 7–18)

## 2017-06-26 PROCEDURE — 81003 URINALYSIS AUTO W/O SCOPE: CPT

## 2017-06-26 PROCEDURE — 36415 COLL VENOUS BLD VENIPUNCTURE: CPT

## 2017-06-26 PROCEDURE — 96374 THER/PROPH/DIAG INJ IV PUSH: CPT

## 2017-06-26 PROCEDURE — 82040 ASSAY OF SERUM ALBUMIN: CPT

## 2017-06-26 PROCEDURE — 99285 EMERGENCY DEPT VISIT HI MDM: CPT

## 2017-06-26 PROCEDURE — 96375 TX/PRO/DX INJ NEW DRUG ADDON: CPT

## 2017-06-26 PROCEDURE — 80048 BASIC METABOLIC PNL TOTAL CA: CPT

## 2017-06-26 PROCEDURE — 85025 COMPLETE CBC W/AUTO DIFF WBC: CPT

## 2017-06-26 PROCEDURE — 96361 HYDRATE IV INFUSION ADD-ON: CPT

## 2017-07-18 ENCOUNTER — HOSPITAL ENCOUNTER (EMERGENCY)
Dept: HOSPITAL 8 - ED | Age: 42
Discharge: HOME | End: 2017-07-18
Payer: MEDICARE

## 2017-07-18 VITALS — WEIGHT: 163.14 LBS | HEIGHT: 68 IN | BODY MASS INDEX: 24.73 KG/M2

## 2017-07-18 VITALS — DIASTOLIC BLOOD PRESSURE: 58 MMHG | SYSTOLIC BLOOD PRESSURE: 106 MMHG

## 2017-07-18 DIAGNOSIS — R11.0: ICD-10-CM

## 2017-07-18 DIAGNOSIS — M54.5: ICD-10-CM

## 2017-07-18 DIAGNOSIS — Z32.01: Primary | ICD-10-CM

## 2017-07-18 DIAGNOSIS — G89.29: ICD-10-CM

## 2017-07-18 DIAGNOSIS — Z85.038: ICD-10-CM

## 2017-07-18 DIAGNOSIS — Z85.028: ICD-10-CM

## 2017-07-18 DIAGNOSIS — M79.89: ICD-10-CM

## 2017-07-18 DIAGNOSIS — F17.210: ICD-10-CM

## 2017-07-18 PROCEDURE — 36415 COLL VENOUS BLD VENIPUNCTURE: CPT

## 2017-07-18 PROCEDURE — 84703 CHORIONIC GONADOTROPIN ASSAY: CPT

## 2017-07-18 PROCEDURE — 99283 EMERGENCY DEPT VISIT LOW MDM: CPT

## 2017-07-19 ENCOUNTER — HOSPITAL ENCOUNTER (OUTPATIENT)
Dept: PAIN MANAGEMENT | Facility: REHABILITATION | Age: 42
End: 2017-07-19
Attending: PHYSICAL MEDICINE & REHABILITATION
Payer: MEDICARE

## 2017-07-24 ENCOUNTER — HOSPITAL ENCOUNTER (EMERGENCY)
Dept: HOSPITAL 8 - ED | Age: 42
Discharge: HOME | End: 2017-07-24
Payer: MEDICARE

## 2017-07-24 VITALS — BODY MASS INDEX: 24.69 KG/M2 | HEIGHT: 68 IN | WEIGHT: 162.92 LBS

## 2017-07-24 VITALS — DIASTOLIC BLOOD PRESSURE: 50 MMHG | SYSTOLIC BLOOD PRESSURE: 110 MMHG

## 2017-07-24 DIAGNOSIS — Z87.891: ICD-10-CM

## 2017-07-24 DIAGNOSIS — Z3A.10: ICD-10-CM

## 2017-07-24 DIAGNOSIS — O20.9: Primary | ICD-10-CM

## 2017-07-24 DIAGNOSIS — Z85.038: ICD-10-CM

## 2017-07-24 LAB
AST SERPL-CCNC: 12 U/L (ref 15–37)
BUN SERPL-MCNC: 11 MG/DL (ref 7–18)
PATH.CAST-FLAG: (no result)
SPERM-FLAG: (no result)
SRC-FLAG: (no result)
XTAL-FLAG: (no result)
YLC-FLAG: (no result)

## 2017-07-24 PROCEDURE — 99285 EMERGENCY DEPT VISIT HI MDM: CPT

## 2017-07-24 PROCEDURE — 76801 OB US < 14 WKS SINGLE FETUS: CPT

## 2017-07-24 PROCEDURE — 81001 URINALYSIS AUTO W/SCOPE: CPT

## 2017-07-24 PROCEDURE — 36415 COLL VENOUS BLD VENIPUNCTURE: CPT

## 2017-07-24 PROCEDURE — 85025 COMPLETE CBC W/AUTO DIFF WBC: CPT

## 2017-07-24 PROCEDURE — 86901 BLOOD TYPING SEROLOGIC RH(D): CPT

## 2017-07-24 PROCEDURE — 84702 CHORIONIC GONADOTROPIN TEST: CPT

## 2017-07-24 PROCEDURE — 80053 COMPREHEN METABOLIC PANEL: CPT

## 2017-07-25 ENCOUNTER — HOSPITAL ENCOUNTER (EMERGENCY)
Dept: HOSPITAL 8 - ED | Age: 42
Discharge: HOME | End: 2017-07-25
Payer: MEDICARE

## 2017-07-25 VITALS — WEIGHT: 163.8 LBS | BODY MASS INDEX: 24.83 KG/M2 | HEIGHT: 68 IN

## 2017-07-25 VITALS — DIASTOLIC BLOOD PRESSURE: 49 MMHG | SYSTOLIC BLOOD PRESSURE: 104 MMHG

## 2017-07-25 DIAGNOSIS — Z88.1: ICD-10-CM

## 2017-07-25 DIAGNOSIS — O26.899: Primary | ICD-10-CM

## 2017-07-25 DIAGNOSIS — Z88.8: ICD-10-CM

## 2017-07-25 DIAGNOSIS — R10.30: ICD-10-CM

## 2017-07-25 DIAGNOSIS — Z3A.00: ICD-10-CM

## 2017-07-25 DIAGNOSIS — F17.200: ICD-10-CM

## 2017-07-25 DIAGNOSIS — Z88.2: ICD-10-CM

## 2017-07-25 DIAGNOSIS — Z88.6: ICD-10-CM

## 2017-07-25 DIAGNOSIS — O99.330: ICD-10-CM

## 2017-07-25 PROCEDURE — 85025 COMPLETE CBC W/AUTO DIFF WBC: CPT

## 2017-07-25 PROCEDURE — 99285 EMERGENCY DEPT VISIT HI MDM: CPT

## 2017-07-25 PROCEDURE — 96375 TX/PRO/DX INJ NEW DRUG ADDON: CPT

## 2017-07-25 PROCEDURE — 81001 URINALYSIS AUTO W/SCOPE: CPT

## 2017-07-25 PROCEDURE — 36415 COLL VENOUS BLD VENIPUNCTURE: CPT

## 2017-07-25 PROCEDURE — 84702 CHORIONIC GONADOTROPIN TEST: CPT

## 2017-07-25 PROCEDURE — 76801 OB US < 14 WKS SINGLE FETUS: CPT

## 2017-07-25 PROCEDURE — 96361 HYDRATE IV INFUSION ADD-ON: CPT

## 2017-07-25 PROCEDURE — 96374 THER/PROPH/DIAG INJ IV PUSH: CPT

## 2017-08-21 ENCOUNTER — HOSPITAL ENCOUNTER (EMERGENCY)
Dept: HOSPITAL 8 - ED | Age: 42
Discharge: LEFT BEFORE DISCHARGE | End: 2017-08-21
Payer: MEDICARE

## 2017-08-21 VITALS — HEIGHT: 68 IN | BODY MASS INDEX: 24.26 KG/M2 | WEIGHT: 160.06 LBS

## 2017-08-21 VITALS — DIASTOLIC BLOOD PRESSURE: 75 MMHG | SYSTOLIC BLOOD PRESSURE: 116 MMHG

## 2017-08-21 DIAGNOSIS — Z53.21: ICD-10-CM

## 2017-08-21 DIAGNOSIS — K13.79: Primary | ICD-10-CM

## 2017-08-31 ENCOUNTER — HOSPITAL ENCOUNTER (EMERGENCY)
Dept: HOSPITAL 8 - ED | Age: 42
Discharge: HOME | End: 2017-08-31
Payer: MEDICARE

## 2017-08-31 VITALS — HEIGHT: 68 IN | WEIGHT: 156.31 LBS | BODY MASS INDEX: 23.69 KG/M2

## 2017-08-31 VITALS — SYSTOLIC BLOOD PRESSURE: 123 MMHG | DIASTOLIC BLOOD PRESSURE: 81 MMHG

## 2017-08-31 DIAGNOSIS — G62.9: ICD-10-CM

## 2017-08-31 DIAGNOSIS — E03.9: Primary | ICD-10-CM

## 2017-08-31 DIAGNOSIS — F31.9: ICD-10-CM

## 2017-08-31 PROCEDURE — 99283 EMERGENCY DEPT VISIT LOW MDM: CPT

## 2017-08-31 PROCEDURE — 93005 ELECTROCARDIOGRAM TRACING: CPT

## 2017-09-20 ENCOUNTER — HOSPITAL ENCOUNTER (EMERGENCY)
Dept: HOSPITAL 8 - ED | Age: 42
Discharge: HOME | End: 2017-09-20
Payer: MEDICARE

## 2017-09-20 VITALS — HEIGHT: 68 IN | BODY MASS INDEX: 23.89 KG/M2 | WEIGHT: 157.63 LBS

## 2017-09-20 VITALS — DIASTOLIC BLOOD PRESSURE: 84 MMHG | SYSTOLIC BLOOD PRESSURE: 137 MMHG

## 2017-09-20 DIAGNOSIS — Z88.8: ICD-10-CM

## 2017-09-20 DIAGNOSIS — Z88.2: ICD-10-CM

## 2017-09-20 DIAGNOSIS — Z88.6: ICD-10-CM

## 2017-09-20 DIAGNOSIS — F41.1: Primary | ICD-10-CM

## 2017-09-20 DIAGNOSIS — Z85.038: ICD-10-CM

## 2017-09-20 DIAGNOSIS — F31.9: ICD-10-CM

## 2017-09-20 PROCEDURE — 99283 EMERGENCY DEPT VISIT LOW MDM: CPT

## 2017-10-14 ENCOUNTER — HOSPITAL ENCOUNTER (EMERGENCY)
Facility: MEDICAL CENTER | Age: 42
End: 2017-10-14
Attending: EMERGENCY MEDICINE
Payer: MEDICARE

## 2017-10-14 VITALS
RESPIRATION RATE: 16 BRPM | WEIGHT: 160 LBS | HEIGHT: 68 IN | TEMPERATURE: 99.8 F | OXYGEN SATURATION: 97 % | DIASTOLIC BLOOD PRESSURE: 87 MMHG | SYSTOLIC BLOOD PRESSURE: 148 MMHG | BODY MASS INDEX: 24.25 KG/M2 | HEART RATE: 92 BPM

## 2017-10-14 DIAGNOSIS — K08.89 ODONTALGIA: ICD-10-CM

## 2017-10-14 PROCEDURE — 99283 EMERGENCY DEPT VISIT LOW MDM: CPT | Mod: EDC

## 2017-10-14 RX ORDER — PENICILLIN V POTASSIUM 500 MG/1
500 TABLET ORAL EVERY 6 HOURS
Qty: 40 TAB | Refills: 0 | Status: SHIPPED | OUTPATIENT
Start: 2017-10-14 | End: 2017-10-24

## 2017-10-14 RX ORDER — ACETAMINOPHEN 325 MG/1
650 TABLET ORAL EVERY 4 HOURS PRN
COMMUNITY
End: 2018-11-25 | Stop reason: CLARIF

## 2017-10-14 RX ORDER — HYDROCODONE BITARTRATE AND ACETAMINOPHEN 5; 325 MG/1; MG/1
1 TABLET ORAL EVERY 4 HOURS PRN
Qty: 15 TAB | Refills: 0 | Status: SHIPPED | OUTPATIENT
Start: 2017-10-14 | End: 2018-11-25 | Stop reason: CLARIF

## 2017-10-14 RX ORDER — IBUPROFEN 800 MG/1
800 TABLET ORAL EVERY 8 HOURS PRN
Qty: 30 TAB | Refills: 0 | Status: SHIPPED | OUTPATIENT
Start: 2017-10-14 | End: 2017-11-29

## 2017-10-14 ASSESSMENT — PAIN SCALES - GENERAL: PAINLEVEL_OUTOF10: 10

## 2017-10-14 NOTE — ED PROVIDER NOTES
"ED Provider Note    CHIEF COMPLAINT  Chief Complaint   Patient presents with   • Dental Pain       HPI  Nancy Olvera is a 42 y.o. female who presents For evaluation of dental pain.  The patient presents complaining of pain to her lower posterior molar area.  The patient states that she broke part of the tooth off while chewing \"bit O honey\".  The patient states she is scheduled to see a dentist on Thursday.  The patient complains of pain localized to the lower posterior molar region.  Patient denies: Fever, URI symptoms, difficulty swallowing, difficulty opening her mouth, facial swelling.  No other acute symptomatology or complaints.    REVIEW OF SYSTEMS  See HPI for further details.  The patient denies: Diabetes, seizures, gastrointestinal disorders.    PAST MEDICAL HISTORY  Past Medical History:   Diagnosis Date   • ADHD (attention deficit hyperactivity disorder)    • Bipolar affective disorder (CMS-[facility])    • Cancer (CMS-[facility])     pt states she has colon cancer   • Congestive heart failure (CMS-[facility])    • Hypertension    • Psychiatric disorder    • Thyroid condition        FAMILY HISTORY  No family history on file.    SOCIAL HISTORY  Positive tobacco use; positive marijuana use;    SURGICAL HISTORY  Past Surgical History:   Procedure Laterality Date   • TONSILLECTOMY         CURRENT MEDICATIONS  See nurses notes    ALLERGIES  Allergies   Allergen Reactions   • Aspirin Anaphylaxis   • Compazine Swelling   • Sulfa Drugs Rash   • Tetracyclines Swelling   • Ultram [Tramadol Hcl] Swelling   • Food      \"Green Peppers\"       PHYSICAL EXAM  VITAL SIGNS: /85   Pulse (!) 115   Temp 36.8 °C (98.3 °F) (Temporal)   Resp 18   Ht 1.727 m (5' 8\")   Wt 72.6 kg (160 lb)   SpO2 97%   BMI 24.33 kg/m²    Constitutional: A 42-year-old female, Well developed, Well nourished, anxious, oriented ×3  HENT: Normocephalic, Atraumatic, Nares:Clear, Oropharynx: moist, well hydrated, posterior pharynx:clear; several missing teeth " identified; patient has tenderness to tooth #18 with breakdown of the enamel and missing half of the canal and Dentin; no gingival swelling; no facial swelling; no trismus; no submandibular edema;   Eyes: PERRL, EOMI, Conjunctiva normal, No discharge.   Neck: Normal range of motion, No tenderness, Supple, No stridor.   Lymphatic: No lymphadenopathy noted.   Cardiovascular: Regular rate and rhythm without mumurs, gallups, rubs   Thorax & Lungs: Normal Equal breath sounds, No respiratory distress, No wheezing, no stridor, no rales. No chest tenderness.   Skin: Good skin turgor, pink, warm, dry. No rashes, petechiae, purpura. Normal capillary refill.   Neurologic: Alert & oriented x 3,  No gross focal deficits noted.   Psychiatric: Affect anxious, Judgment normal, Mood normal.     COURSE & MEDICAL DECISION MAKING  Pertinent Labs & Imaging studies reviewed. (See chart for details)  Discussion: At this time, the patient presents for evaluation dental pain.  I see no evidence of complications that required emergent intervention.  I discussed the findings and treatment plan with patient.  She indicates she is comfortable with this exploration and disposition.    FINAL IMPRESSION  1. Odontalgia           PLAN  1.  Appropriate discharge instructions given  2.  Pen-Vee K 500 mg #40  3.  Motrin 800 mg #30  4.  Lortab 5 mg #15  5.  Follow-up with dental services    Electronically signed by: Guy G Gansert, 10/14/2017 12:20 PM

## 2017-10-14 NOTE — ED NOTES
"Discharge instructions reviewed with pt regarding dental abscess, RX x 3 provided.  Instructed to not drink or drive while taking pain medication.  Pt instructed on signs and symptoms to return to ED, instructed on importance of oral hydration, no questions regarding this.   Instructed to follow-up with   [facility]  [address]  [place] [address]  [phone]          [address] [address]  [phone]        Pt has no questions at this time, /87   Pulse 92   Temp 37.7 °C (99.8 °F)   Resp 16   Ht 1.727 m (5' 8\")   Wt 72.6 kg (160 lb)   SpO2 97%   BMI 24.33 kg/m²   Pt leaves alert, age appropriate and in NAD.          "

## 2017-10-14 NOTE — DISCHARGE INSTRUCTIONS
Dental Caries  Dental caries (also called tooth decay) is the most common oral disease. It can occur at any age but is more common in children and young adults.   HOW DENTAL CARIES DEVELOPS   The process of decay begins when bacteria and foods (particularly sugars and starches) combine in your mouth to produce plaque. Plaque is a substance that sticks to the hard, outer surface of a tooth (enamel). The bacteria in plaque produce acids that attack enamel. These acids may also attack the root surface of a tooth (cementum) if it is exposed. Repeated attacks dissolve these surfaces and create holes in the tooth (cavities). If left untreated, the acids destroy the other layers of the tooth.   RISK FACTORS  · Frequent sipping of sugary beverages.    · Frequent snacking on sugary and starchy foods, especially those that easily get stuck in the teeth.    · Poor oral hygiene.    · Dry mouth.    · Substance abuse such as methamphetamine abuse.    · Broken or poor-fitting dental restorations.    · Eating disorders.    · Gastroesophageal reflux disease (GERD).    · Certain radiation treatments to the head and neck.  SYMPTOMS  In the early stages of dental caries, symptoms are seldom present. Sometimes white, chalky areas may be seen on the enamel or other tooth layers. In later stages, symptoms may include:  · Pits and holes on the enamel.  · Toothache after sweet, hot, or cold foods or drinks are consumed.  · Pain around the tooth.  · Swelling around the tooth.  DIAGNOSIS   Most of the time, dental caries is detected during a regular dental checkup. A diagnosis is made after a thorough medical and dental history is taken and the surfaces of your teeth are checked for signs of dental caries. Sometimes special instruments, such as lasers, are used to check for dental caries. Dental X-ray exams may be taken so that areas not visible to the eye (such as between the contact areas of the teeth) can be checked for cavities.    TREATMENT   If dental caries is in its early stages, it may be reversed with a fluoride treatment or an application of a remineralizing agent at the dental office. Thorough brushing and flossing at home is needed to aid these treatments. If it is in its later stages, treatment depends on the location and extent of tooth destruction:   · If a small area of the tooth has been destroyed, the destroyed area will be removed and cavities will be filled with a material such as gold, silver amalgam, or composite resin.    · If a large area of the tooth has been destroyed, the destroyed area will be removed and a cap (crown) will be fitted over the remaining tooth structure.    · If the center part of the tooth (pulp) is affected, a procedure called a root canal will be needed before a filling or crown can be placed.    · If most of the tooth has been destroyed, the tooth may need to be pulled (extracted).  HOME CARE INSTRUCTIONS  You can prevent, stop, or reverse dental caries at home by practicing good oral hygiene. Good oral hygiene includes:  · Thoroughly cleaning your teeth at least twice a day with a toothbrush and dental floss.    · Using a fluoride toothpaste. A fluoride mouth rinse may also be used if recommended by your dentist or health care provider.    · Restricting the amount of sugary and starchy foods and sugary liquids you consume.    · Avoiding frequent snacking on these foods and sipping of these liquids.    · Keeping regular visits with a dentist for checkups and cleanings.  PREVENTION   · Practice good oral hygiene.  · Consider a dental sealant. A dental sealant is a coating material that is applied by your dentist to the pits and grooves of teeth. The sealant prevents food from being trapped in them. It may protect the teeth for several years.  · Ask about fluoride supplements if you live in a community without fluorinated water or with water that has a low fluoride content. Use fluoride supplements  as directed by your dentist or health care provider.  · Allow fluoride varnish applications to teeth if directed by your dentist or health care provider.     This information is not intended to replace advice given to you by your health care provider. Make sure you discuss any questions you have with your health care provider.     Document Released: 09/09/2003 Document Revised: 01/08/2016 Document Reviewed: 12/20/2013  [employer] Interactive Patient Education ©2016 [employer]

## 2017-11-28 ENCOUNTER — APPOINTMENT (OUTPATIENT)
Dept: RADIOLOGY | Facility: MEDICAL CENTER | Age: 42
End: 2017-11-28
Attending: EMERGENCY MEDICINE
Payer: MEDICARE

## 2017-11-28 ENCOUNTER — HOSPITAL ENCOUNTER (EMERGENCY)
Facility: MEDICAL CENTER | Age: 42
End: 2017-11-29
Attending: EMERGENCY MEDICINE
Payer: MEDICARE

## 2017-11-28 DIAGNOSIS — F31.9 BIPOLAR AFFECTIVE DISORDER, REMISSION STATUS UNSPECIFIED (HCC): ICD-10-CM

## 2017-11-28 DIAGNOSIS — G89.29 CHRONIC BILATERAL LOW BACK PAIN WITH SCIATICA, SCIATICA LATERALITY UNSPECIFIED: ICD-10-CM

## 2017-11-28 DIAGNOSIS — F41.9 ANXIETY: ICD-10-CM

## 2017-11-28 DIAGNOSIS — M51.36 L4-L5 DISC BULGE: ICD-10-CM

## 2017-11-28 DIAGNOSIS — M54.40 CHRONIC BILATERAL LOW BACK PAIN WITH SCIATICA, SCIATICA LATERALITY UNSPECIFIED: ICD-10-CM

## 2017-11-28 DIAGNOSIS — M54.42 ACUTE BILATERAL LOW BACK PAIN WITH LEFT-SIDED SCIATICA: ICD-10-CM

## 2017-11-28 LAB
ANION GAP SERPL CALC-SCNC: 9 MMOL/L (ref 0–11.9)
BASOPHILS # BLD AUTO: 0.4 % (ref 0–1.8)
BASOPHILS # BLD: 0.02 K/UL (ref 0–0.12)
BUN SERPL-MCNC: 17 MG/DL (ref 8–22)
CALCIUM SERPL-MCNC: 9.2 MG/DL (ref 8.5–10.5)
CHLORIDE SERPL-SCNC: 107 MMOL/L (ref 96–112)
CO2 SERPL-SCNC: 20 MMOL/L (ref 20–33)
CREAT SERPL-MCNC: 0.81 MG/DL (ref 0.5–1.4)
EOSINOPHIL # BLD AUTO: 0.22 K/UL (ref 0–0.51)
EOSINOPHIL NFR BLD: 4.3 % (ref 0–6.9)
ERYTHROCYTE [DISTWIDTH] IN BLOOD BY AUTOMATED COUNT: 46.5 FL (ref 35.9–50)
GFR SERPL CREATININE-BSD FRML MDRD: >60 ML/MIN/1.73 M 2
GLUCOSE SERPL-MCNC: 95 MG/DL (ref 65–99)
HCT VFR BLD AUTO: 39.5 % (ref 37–47)
HGB BLD-MCNC: 13.3 G/DL (ref 12–16)
IMM GRANULOCYTES # BLD AUTO: 0.01 K/UL (ref 0–0.11)
IMM GRANULOCYTES NFR BLD AUTO: 0.2 % (ref 0–0.9)
INR PPP: 0.97 (ref 0.87–1.13)
LYMPHOCYTES # BLD AUTO: 0.86 K/UL (ref 1–4.8)
LYMPHOCYTES NFR BLD: 16.9 % (ref 22–41)
MCH RBC QN AUTO: 29.8 PG (ref 27–33)
MCHC RBC AUTO-ENTMCNC: 33.7 G/DL (ref 33.6–35)
MCV RBC AUTO: 88.6 FL (ref 81.4–97.8)
MONOCYTES # BLD AUTO: 0.29 K/UL (ref 0–0.85)
MONOCYTES NFR BLD AUTO: 5.7 % (ref 0–13.4)
NEUTROPHILS # BLD AUTO: 3.68 K/UL (ref 2–7.15)
NEUTROPHILS NFR BLD: 72.5 % (ref 44–72)
NRBC # BLD AUTO: 0 K/UL
NRBC BLD AUTO-RTO: 0 /100 WBC
PLATELET # BLD AUTO: 188 K/UL (ref 164–446)
PMV BLD AUTO: 10.4 FL (ref 9–12.9)
POTASSIUM SERPL-SCNC: 3.6 MMOL/L (ref 3.6–5.5)
PROTHROMBIN TIME: 12.6 SEC (ref 12–14.6)
RBC # BLD AUTO: 4.46 M/UL (ref 4.2–5.4)
SODIUM SERPL-SCNC: 136 MMOL/L (ref 135–145)
WBC # BLD AUTO: 5.1 K/UL (ref 4.8–10.8)

## 2017-11-28 PROCEDURE — 96374 THER/PROPH/DIAG INJ IV PUSH: CPT

## 2017-11-28 PROCEDURE — 96375 TX/PRO/DX INJ NEW DRUG ADDON: CPT

## 2017-11-28 PROCEDURE — 99285 EMERGENCY DEPT VISIT HI MDM: CPT

## 2017-11-28 PROCEDURE — 85025 COMPLETE CBC W/AUTO DIFF WBC: CPT

## 2017-11-28 PROCEDURE — 700111 HCHG RX REV CODE 636 W/ 250 OVERRIDE (IP): Performed by: EMERGENCY MEDICINE

## 2017-11-28 PROCEDURE — 72148 MRI LUMBAR SPINE W/O DYE: CPT

## 2017-11-28 PROCEDURE — 36415 COLL VENOUS BLD VENIPUNCTURE: CPT

## 2017-11-28 PROCEDURE — 96376 TX/PRO/DX INJ SAME DRUG ADON: CPT

## 2017-11-28 PROCEDURE — 80048 BASIC METABOLIC PNL TOTAL CA: CPT

## 2017-11-28 PROCEDURE — 85610 PROTHROMBIN TIME: CPT

## 2017-11-28 RX ORDER — MORPHINE SULFATE 10 MG/ML
8 INJECTION, SOLUTION INTRAMUSCULAR; INTRAVENOUS ONCE
Status: COMPLETED | OUTPATIENT
Start: 2017-11-28 | End: 2017-11-28

## 2017-11-28 RX ORDER — MORPHINE SULFATE 10 MG/ML
6 INJECTION, SOLUTION INTRAMUSCULAR; INTRAVENOUS ONCE
Status: COMPLETED | OUTPATIENT
Start: 2017-11-28 | End: 2017-11-28

## 2017-11-28 RX ORDER — ONDANSETRON 2 MG/ML
4 INJECTION INTRAMUSCULAR; INTRAVENOUS ONCE
Status: COMPLETED | OUTPATIENT
Start: 2017-11-28 | End: 2017-11-28

## 2017-11-28 RX ADMIN — MORPHINE SULFATE 8 MG: 10 INJECTION INTRAVENOUS at 23:32

## 2017-11-28 RX ADMIN — MORPHINE SULFATE 6 MG: 10 INJECTION INTRAVENOUS at 21:45

## 2017-11-28 RX ADMIN — ONDANSETRON 4 MG: 2 INJECTION INTRAMUSCULAR; INTRAVENOUS at 21:45

## 2017-11-28 ASSESSMENT — LIFESTYLE VARIABLES
DO YOU DRINK ALCOHOL: YES
HAVE PEOPLE ANNOYED YOU BY CRITICIZING YOUR DRINKING: NO
AVERAGE NUMBER OF DAYS PER WEEK YOU HAVE A DRINK CONTAINING ALCOHOL: 0
CONSUMPTION TOTAL: NEGATIVE
HOW MANY TIMES IN THE PAST YEAR HAVE YOU HAD 5 OR MORE DRINKS IN A DAY: 0
TOTAL SCORE: 0
HAVE YOU EVER FELT YOU SHOULD CUT DOWN ON YOUR DRINKING: NO
TOTAL SCORE: 0
EVER FELT BAD OR GUILTY ABOUT YOUR DRINKING: NO
EVER HAD A DRINK FIRST THING IN THE MORNING TO STEADY YOUR NERVES TO GET RID OF A HANGOVER: NO
TOTAL SCORE: 0
ON A TYPICAL DAY WHEN YOU DRINK ALCOHOL HOW MANY DRINKS DO YOU HAVE: 1

## 2017-11-28 ASSESSMENT — PAIN SCALES - GENERAL: PAINLEVEL_OUTOF10: 9

## 2017-11-29 VITALS
SYSTOLIC BLOOD PRESSURE: 108 MMHG | BODY MASS INDEX: 24.22 KG/M2 | WEIGHT: 159.83 LBS | RESPIRATION RATE: 19 BRPM | HEIGHT: 68 IN | OXYGEN SATURATION: 95 % | DIASTOLIC BLOOD PRESSURE: 63 MMHG | TEMPERATURE: 98.7 F | HEART RATE: 98 BPM

## 2017-11-29 PROCEDURE — 700102 HCHG RX REV CODE 250 W/ 637 OVERRIDE(OP): Performed by: EMERGENCY MEDICINE

## 2017-11-29 PROCEDURE — A9270 NON-COVERED ITEM OR SERVICE: HCPCS | Performed by: EMERGENCY MEDICINE

## 2017-11-29 RX ORDER — IBUPROFEN 600 MG/1
600 TABLET ORAL ONCE
Status: COMPLETED | OUTPATIENT
Start: 2017-11-29 | End: 2017-11-29

## 2017-11-29 RX ORDER — IBUPROFEN 400 MG/1
400 TABLET ORAL EVERY 8 HOURS PRN
Qty: 20 TAB | Refills: 0 | Status: SHIPPED | OUTPATIENT
Start: 2017-11-29 | End: 2017-12-04

## 2017-11-29 RX ADMIN — IBUPROFEN 600 MG: 600 TABLET, FILM COATED ORAL at 02:41

## 2017-11-29 NOTE — ED NOTES
Pt brought to room by Ed tech in Wheelchair accompanied by . Pt changed in gown and placed on monitors, will continue to monitor.
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
## 2017-11-29 NOTE — DISCHARGE INSTRUCTIONS
You were seen and evaluated in the Emergency Department at [facility] for:     Low back pain, fall.    You had the following tests and studies:    MRI shows disc bulge but not to a dangerous degree.     You received the following medications:    Morphine, ibuprofen.     ----------------------------    Please make sure to follow up with:    Dr. Jurado from Neurosurgery to make a plan for how to get better, he's a spine surgeon.    [facility] to establish a primary care doctor.     Return to the ER for any worse pain, inability to pee, incontinence, fevers, numbness/tingling/weakness, or any other concerns.    Good luck, and feel better!  ----------------------------    We always encourage patients to return IMMEDIATELY if they have:  Increased or changing pain, passing out, fevers over 100.4 (taken in your mouth or rectally) for more than 2 days, redness or swelling of skin or tissues, feeling like your heart is beating fast, chest pain that is new or worsening, trouble breathing, feeling like your throat is closing up and can not breath, inability to walk, weakness of any part of your body, new dizziness, severe bleeding that won't stop from any part of your body, if you can't eat or drink, or if you have any other concerns.   If you feel worse, please know that you can always return with any questions, concerns, worse symptoms, or you are feeling unsafe. We certainly cannot say for sure that we have ruled out every illness or dangerous disease, but we feel that at this specific time, your exam, tests, and vital signs like heart rate and blood pressure are safe for discharge.       Herniated Disk  A herniated disk occurs when a disk in your spine bulges out too far. Your spine (backbone) is made up of bones called vertebrae. A disk with a spongy center is located between each pair of bones. These disks act as shock absorbers when you move. A herniated disk can cause pain and muscle weakness.    HOME  CARE  · Take all medicines as told by your doctor.  · Rest for 2 days and then start moving.  ¨ Do not sit or stand for long periods of time.  ¨ Maintain good posture when sitting and standing.  ¨ Avoid moving in a way that causes pain, such as bending or lifting.  · When you are able to start lifting things again:  ¨ Bend with your knees.  ¨ Keep your back straight.  ¨ Hold heavy objects close to your body.  · If you are overweight, ask your doctor about starting a weight-loss program.  · When you are able to start exercising, ask your doctor how much and what type of exercise is best for you.  · Work with a physical therapist on stretching and strengthening exercises for your back.  · Do not wear high-heeled shoes.  · Do not sleep on your belly.  · Do not smoke.  · Keep all follow-up visits as told by your doctor.  GET HELP IF:  · You have back or neck pain that is not getting better after 4 weeks.  · You have very bad pain in your back or neck.  · You have a loss of feeling (numbness), tingling, or weakness along with pain.  GET HELP RIGHT AWAY IF:  · You have tingling, weakness, or loss of feeling that makes you unable to use your arms or legs.  · You are not able to control when you pee (urinate) or poop (bowel movement).  · You have dizziness or fainting.  · You have shortness of breath.  MAKE SURE YOU:  · Understand these instructions.  · Will watch your condition.  · Will get help right away if you are not doing well or get worse.     This information is not intended to replace advice given to you by your health care provider. Make sure you discuss any questions you have with your health care provider.     Document Released: 05/04/2015 Document Reviewed: 05/04/2015  [employer] Interactive Patient Education ©2016 [employer]      Chronic Back Pain   When back pain lasts longer than 3 months, it is called chronic back pain. People with chronic back pain often go through certain periods that are more intense  (flare-ups).   CAUSES  Chronic back pain can be caused by wear and tear (degeneration) on different structures in your back. These structures include:  · The bones of your spine (vertebrae) and the joints surrounding your spinal cord and nerve roots (facets).  · The strong, fibrous tissues that connect your vertebrae (ligaments).  Degeneration of these structures may result in pressure on your nerves. This can lead to constant pain.  HOME CARE INSTRUCTIONS  · Avoid bending, heavy lifting, prolonged sitting, and activities which make the problem worse.  · Take brief periods of rest throughout the day to reduce your pain. Lying down or standing usually is better than sitting while you are resting.  · Take over-the-counter or prescription medicines only as directed by your caregiver.  SEEK IMMEDIATE MEDICAL CARE IF:   · You have weakness or numbness in one of your legs or feet.  · You have trouble controlling your bladder or bowels.  · You have nausea, vomiting, abdominal pain, shortness of breath, or fainting.     This information is not intended to replace advice given to you by your health care provider. Make sure you discuss any questions you have with your health care provider.     Document Released: 01/25/2006 Document Revised: 03/11/2013 Document Reviewed: 12/01/2012  [employer] Interactive Patient Education ©2016 [employer]      Back Exercises  Back exercises help treat and prevent back injuries. The goal is to increase your strength in your belly (abdominal) and back muscles. These exercises can also help with flexibility. Start these exercises when told by your doctor.  HOME CARE  Back exercises include:  Pelvic Tilt.  · Lie on your back with your knees bent. Tilt your pelvis until the lower part of your back is against the floor. Hold this position 5 to 10 sec. Repeat this exercise 5 to 10 times.  Knee to Chest.  · Pull 1 knee up against your chest and hold for 20 to 30 seconds. Repeat this with the other  knee. This may be done with the other leg straight or bent, whichever feels better. Then, pull both knees up against your chest.  Sit-Ups or Curl-Ups.  · Bend your knees 90 degrees. Start with tilting your pelvis, and do a partial, slow sit-up. Only lift your upper half 30 to 45 degrees off the floor. Take at least 2 to 3 seonds for each sit-up. Do not do sit-ups with your knees out straight. If partial sit-ups are difficult, simply do the above but with only tightening your belly (abdominal) muscles and holding it as told.  Hip-Lift.  · Lie on your back with your knees flexed 90 degrees. Push down with your feet and shoulders as you raise your hips 2 inches off the floor. Hold for 10 seconds, repeat 5 to 10 times.  Back Arches.  · Lie on your stomach. Prop yourself up on bent elbows. Slowly press on your hands, causing an arch in your low back. Repeat 3 to 5 times.  Shoulder-Lifts.  · Lie face down with arms beside your body. Keep hips and belly pressed to floor as you slowly lift your head and shoulders off the floor.  Do not overdo your exercises. Be careful in the beginning. Exercises may cause you some mild back discomfort. If the pain lasts for more than 15 minutes, stop the exercises until you see your doctor. Improvement with exercise for back problems is slow.      This information is not intended to replace advice given to you by your health care provider. Make sure you discuss any questions you have with your health care provider.     Document Released: 01/20/2012 Document Revised: 03/11/2013 Document Reviewed: 02/11/2016  [employer] Interactive Patient Education ©2016 [employer]
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
## 2017-11-29 NOTE — ED NOTES
Pt Given discharge instructions/ home care instructions, Pt verbalized understanding of instructions given. Pt requested pain medication from provider.

## 2018-01-27 ENCOUNTER — HOSPITAL ENCOUNTER (EMERGENCY)
Dept: HOSPITAL 8 - ED | Age: 43
Discharge: HOME | End: 2018-01-27
Payer: MEDICARE

## 2018-01-27 VITALS — BODY MASS INDEX: 27.1 KG/M2 | WEIGHT: 178.79 LBS | HEIGHT: 68 IN

## 2018-01-27 VITALS — SYSTOLIC BLOOD PRESSURE: 103 MMHG | DIASTOLIC BLOOD PRESSURE: 46 MMHG
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
## 2018-01-27 DIAGNOSIS — M51.36: ICD-10-CM
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
## 2018-01-27 DIAGNOSIS — M48.061: ICD-10-CM
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
## 2018-01-27 DIAGNOSIS — S33.5XXA: Primary | ICD-10-CM

## 2018-01-27 DIAGNOSIS — M51.26: ICD-10-CM

## 2018-01-27 LAB
ALBUMIN SERPL-MCNC: 2.6 G/DL (ref 3.4–5)
ALBUMIN SERPL-MCNC: 2.7 G/DL (ref 3.4–5)
ALP SERPL-CCNC: 53 U/L (ref 45–117)
ALT SERPL-CCNC: 23 U/L (ref 12–78)
ANION GAP SERPL CALC-SCNC: 7 MMOL/L (ref 5–15)
BASOPHILS # BLD AUTO: 0.1 X10^3/UL (ref 0–0.1)
BASOPHILS NFR BLD AUTO: 1 % (ref 0–1)
BILIRUB SERPL-MCNC: 0.2 MG/DL (ref 0.2–1)
CALCIUM SERPL-MCNC: 7.8 MG/DL (ref 8.5–10.1)
CHLORIDE SERPL-SCNC: 115 MMOL/L (ref 98–107)
CREAT SERPL-MCNC: 0.85 MG/DL (ref 0.55–1.02)
CULTURE INDICATED?: NO
EOSINOPHIL # BLD AUTO: 1.39 X10^3/UL (ref 0–0.4)
EOSINOPHIL NFR BLD AUTO: 18 % (ref 1–7)
ERYTHROCYTE [DISTWIDTH] IN BLOOD BY AUTOMATED COUNT: 16 % (ref 9.6–15.2)
LYMPHOCYTES # BLD AUTO: 1.18 X10^3/UL (ref 1–3.4)
LYMPHOCYTES NFR BLD AUTO: 15 % (ref 22–44)
MCH RBC QN AUTO: 30.2 PG (ref 27–34.8)
MCHC RBC AUTO-ENTMCNC: 33.4 G/DL (ref 32.4–35.8)
MCV RBC AUTO: 90.6 FL (ref 80–100)
MD: NO
MICROSCOPIC: (no result)
MONOCYTES # BLD AUTO: 0.22 X10^3/UL (ref 0.2–0.8)
MONOCYTES NFR BLD AUTO: 3 % (ref 2–9)
NEUTROPHILS # BLD AUTO: 4.95 X10^3/UL (ref 1.8–6.8)
NEUTROPHILS NFR BLD AUTO: 63 % (ref 42–75)
PLATELET # BLD AUTO: 229 X10^3/UL (ref 130–400)
PMV BLD AUTO: 8.2 FL (ref 7.4–10.4)
PROT SERPL-MCNC: 5.1 G/DL (ref 6.4–8.2)
RBC # BLD AUTO: 3.64 X10^6/UL (ref 3.82–5.3)

## 2018-01-27 PROCEDURE — 81003 URINALYSIS AUTO W/O SCOPE: CPT

## 2018-01-27 PROCEDURE — 80048 BASIC METABOLIC PNL TOTAL CA: CPT

## 2018-01-27 PROCEDURE — 82040 ASSAY OF SERUM ALBUMIN: CPT

## 2018-01-27 PROCEDURE — 96374 THER/PROPH/DIAG INJ IV PUSH: CPT

## 2018-01-27 PROCEDURE — 99285 EMERGENCY DEPT VISIT HI MDM: CPT

## 2018-01-27 PROCEDURE — 85025 COMPLETE CBC W/AUTO DIFF WBC: CPT

## 2018-01-27 PROCEDURE — 72158 MRI LUMBAR SPINE W/O & W/DYE: CPT

## 2018-01-27 PROCEDURE — 80076 HEPATIC FUNCTION PANEL: CPT

## 2018-01-27 PROCEDURE — A9585 GADOBUTROL INJECTION: HCPCS

## 2018-01-27 PROCEDURE — 36415 COLL VENOUS BLD VENIPUNCTURE: CPT

## 2018-03-09 ENCOUNTER — HOSPITAL ENCOUNTER (EMERGENCY)
Facility: MEDICAL CENTER | Age: 43
End: 2018-03-09
Payer: MEDICARE

## 2018-03-09 VITALS
HEART RATE: 109 BPM | TEMPERATURE: 97.3 F | SYSTOLIC BLOOD PRESSURE: 116 MMHG | DIASTOLIC BLOOD PRESSURE: 62 MMHG | RESPIRATION RATE: 18 BRPM | HEIGHT: 68 IN | BODY MASS INDEX: 24.89 KG/M2 | WEIGHT: 164.24 LBS | OXYGEN SATURATION: 98 %

## 2018-03-09 PROCEDURE — 302449 STATCHG TRIAGE ONLY (STATISTIC)

## 2018-03-09 ASSESSMENT — PAIN SCALES - GENERAL: PAINLEVEL_OUTOF10: 4
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
## 2018-03-18 ENCOUNTER — HOSPITAL ENCOUNTER (EMERGENCY)
Facility: MEDICAL CENTER | Age: 43
End: 2018-03-18
Attending: EMERGENCY MEDICINE
Payer: MEDICARE

## 2018-03-18 VITALS
WEIGHT: 167.99 LBS | DIASTOLIC BLOOD PRESSURE: 62 MMHG | SYSTOLIC BLOOD PRESSURE: 120 MMHG | TEMPERATURE: 97 F | OXYGEN SATURATION: 98 % | HEIGHT: 68 IN | BODY MASS INDEX: 25.46 KG/M2 | HEART RATE: 82 BPM | RESPIRATION RATE: 16 BRPM

## 2018-03-18 DIAGNOSIS — M54.16 LUMBAR RADICULOPATHY: ICD-10-CM

## 2018-03-18 DIAGNOSIS — Z76.0 MEDICATION REFILL: ICD-10-CM

## 2018-03-18 DIAGNOSIS — F41.9 ANXIETY: ICD-10-CM

## 2018-03-18 LAB
ALBUMIN SERPL BCP-MCNC: 4 G/DL (ref 3.2–4.9)
ALBUMIN/GLOB SERPL: 1.5 G/DL
ALP SERPL-CCNC: 54 U/L (ref 30–99)
ALT SERPL-CCNC: 9 U/L (ref 2–50)
AMPHET UR QL SCN: NEGATIVE
ANION GAP SERPL CALC-SCNC: 4 MMOL/L (ref 0–11.9)
APPEARANCE UR: CLEAR
APTT PPP: 29.2 SEC (ref 24.7–36)
AST SERPL-CCNC: 13 U/L (ref 12–45)
BACTERIA #/AREA URNS HPF: NEGATIVE /HPF
BARBITURATES UR QL SCN: NEGATIVE
BASOPHILS # BLD AUTO: 0.4 % (ref 0–1.8)
BASOPHILS # BLD: 0.04 K/UL (ref 0–0.12)
BENZODIAZ UR QL SCN: NEGATIVE
BILIRUB SERPL-MCNC: 0.3 MG/DL (ref 0.1–1.5)
BILIRUB UR QL STRIP.AUTO: NEGATIVE
BUN SERPL-MCNC: 16 MG/DL (ref 8–22)
BZE UR QL SCN: NEGATIVE
CALCIUM SERPL-MCNC: 9.2 MG/DL (ref 8.5–10.5)
CANNABINOIDS UR QL SCN: POSITIVE
CHLORIDE SERPL-SCNC: 109 MMOL/L (ref 96–112)
CO2 SERPL-SCNC: 24 MMOL/L (ref 20–33)
COLOR UR: ABNORMAL
CREAT SERPL-MCNC: 0.89 MG/DL (ref 0.5–1.4)
CULTURE IF INDICATED INDCX: YES UA CULTURE
EOSINOPHIL # BLD AUTO: 1.56 K/UL (ref 0–0.51)
EOSINOPHIL NFR BLD: 16.3 % (ref 0–6.9)
EPI CELLS #/AREA URNS HPF: ABNORMAL /HPF
ERYTHROCYTE [DISTWIDTH] IN BLOOD BY AUTOMATED COUNT: 48.2 FL (ref 35.9–50)
ETHANOL BLD-MCNC: 0 G/DL
GLOBULIN SER CALC-MCNC: 2.6 G/DL (ref 1.9–3.5)
GLUCOSE SERPL-MCNC: 87 MG/DL (ref 65–99)
GLUCOSE UR STRIP.AUTO-MCNC: NEGATIVE MG/DL
HCT VFR BLD AUTO: 37 % (ref 37–47)
HGB BLD-MCNC: 11.7 G/DL (ref 12–16)
HYALINE CASTS #/AREA URNS LPF: ABNORMAL /LPF
IMM GRANULOCYTES # BLD AUTO: 0.03 K/UL (ref 0–0.11)
IMM GRANULOCYTES NFR BLD AUTO: 0.3 % (ref 0–0.9)
INR PPP: 1.03 (ref 0.87–1.13)
KETONES UR STRIP.AUTO-MCNC: NEGATIVE MG/DL
LEUKOCYTE ESTERASE UR QL STRIP.AUTO: ABNORMAL
LYMPHOCYTES # BLD AUTO: 2.19 K/UL (ref 1–4.8)
LYMPHOCYTES NFR BLD: 22.8 % (ref 22–41)
MCH RBC QN AUTO: 29.1 PG (ref 27–33)
MCHC RBC AUTO-ENTMCNC: 31.6 G/DL (ref 33.6–35)
MCV RBC AUTO: 92 FL (ref 81.4–97.8)
METHADONE UR QL SCN: NEGATIVE
MICRO URNS: ABNORMAL
MONOCYTES # BLD AUTO: 0.26 K/UL (ref 0–0.85)
MONOCYTES NFR BLD AUTO: 2.7 % (ref 0–13.4)
NEUTROPHILS # BLD AUTO: 5.52 K/UL (ref 2–7.15)
NEUTROPHILS NFR BLD: 57.5 % (ref 44–72)
NITRITE UR QL STRIP.AUTO: NEGATIVE
NRBC # BLD AUTO: 0 K/UL
NRBC BLD-RTO: 0 /100 WBC
OPIATES UR QL SCN: NEGATIVE
OXYCODONE UR QL SCN: NEGATIVE
PCP UR QL SCN: NEGATIVE
PH UR STRIP.AUTO: 6 [PH]
PLATELET # BLD AUTO: 289 K/UL (ref 164–446)
PMV BLD AUTO: 9.6 FL (ref 9–12.9)
POTASSIUM SERPL-SCNC: 4.1 MMOL/L (ref 3.6–5.5)
PROPOXYPH UR QL SCN: NEGATIVE
PROT SERPL-MCNC: 6.6 G/DL (ref 6–8.2)
PROT UR QL STRIP: NEGATIVE MG/DL
PROTHROMBIN TIME: 13.2 SEC (ref 12–14.6)
RBC # BLD AUTO: 4.02 M/UL (ref 4.2–5.4)
RBC # URNS HPF: >150 /HPF
RBC UR QL AUTO: ABNORMAL
SODIUM SERPL-SCNC: 137 MMOL/L (ref 135–145)
SP GR UR STRIP.AUTO: 1.01
UROBILINOGEN UR STRIP.AUTO-MCNC: 0.2 MG/DL
WBC # BLD AUTO: 9.6 K/UL (ref 4.8–10.8)
WBC #/AREA URNS HPF: ABNORMAL /HPF

## 2018-03-18 PROCEDURE — 80307 DRUG TEST PRSMV CHEM ANLYZR: CPT

## 2018-03-18 PROCEDURE — 700102 HCHG RX REV CODE 250 W/ 637 OVERRIDE(OP): Performed by: EMERGENCY MEDICINE

## 2018-03-18 PROCEDURE — 80053 COMPREHEN METABOLIC PANEL: CPT

## 2018-03-18 PROCEDURE — 99285 EMERGENCY DEPT VISIT HI MDM: CPT

## 2018-03-18 PROCEDURE — 51798 US URINE CAPACITY MEASURE: CPT

## 2018-03-18 PROCEDURE — 87077 CULTURE AEROBIC IDENTIFY: CPT

## 2018-03-18 PROCEDURE — 85610 PROTHROMBIN TIME: CPT

## 2018-03-18 PROCEDURE — 81001 URINALYSIS AUTO W/SCOPE: CPT | Mod: XU

## 2018-03-18 PROCEDURE — 85730 THROMBOPLASTIN TIME PARTIAL: CPT

## 2018-03-18 PROCEDURE — A9270 NON-COVERED ITEM OR SERVICE: HCPCS | Performed by: EMERGENCY MEDICINE

## 2018-03-18 PROCEDURE — 94760 N-INVAS EAR/PLS OXIMETRY 1: CPT

## 2018-03-18 PROCEDURE — 85025 COMPLETE CBC W/AUTO DIFF WBC: CPT

## 2018-03-18 PROCEDURE — 87086 URINE CULTURE/COLONY COUNT: CPT

## 2018-03-18 RX ORDER — GABAPENTIN 300 MG/1
900 CAPSULE ORAL 3 TIMES DAILY
COMMUNITY
End: 2018-03-18

## 2018-03-18 RX ORDER — LEVOTHYROXINE SODIUM 0.07 MG/1
75 TABLET ORAL
Qty: 10 TAB | Refills: 0 | Status: SHIPPED | OUTPATIENT
Start: 2018-03-18 | End: 2019-02-13

## 2018-03-18 RX ORDER — CLONAZEPAM 0.5 MG/1
0.5 TABLET ORAL 3 TIMES DAILY
Qty: 9 TAB | Refills: 0 | Status: SHIPPED | OUTPATIENT
Start: 2018-03-18 | End: 2018-03-21

## 2018-03-18 RX ORDER — GABAPENTIN 300 MG/1
900 CAPSULE ORAL ONCE
Status: COMPLETED | OUTPATIENT
Start: 2018-03-18 | End: 2018-03-18

## 2018-03-18 RX ORDER — LORAZEPAM 1 MG/1
1 TABLET ORAL 4 TIMES DAILY
COMMUNITY
End: 2018-11-25 | Stop reason: CLARIF

## 2018-03-18 RX ORDER — DIVALPROEX SODIUM 250 MG/1
TABLET, DELAYED RELEASE ORAL
Qty: 48 TAB | Refills: 0 | Status: SHIPPED | OUTPATIENT
Start: 2018-03-18 | End: 2018-11-25 | Stop reason: CLARIF

## 2018-03-18 RX ORDER — PROPRANOLOL HYDROCHLORIDE 10 MG/1
10 TABLET ORAL 2 TIMES DAILY
Qty: 24 TAB | Refills: 0 | Status: SHIPPED | OUTPATIENT
Start: 2018-03-18 | End: 2018-11-25

## 2018-03-18 RX ORDER — GABAPENTIN 300 MG/1
1200 CAPSULE ORAL 3 TIMES DAILY
Qty: 160 CAP | Refills: 0 | Status: SHIPPED | OUTPATIENT
Start: 2018-03-18 | End: 2018-11-25 | Stop reason: CLARIF

## 2018-03-18 RX ADMIN — GABAPENTIN 900 MG: 300 CAPSULE ORAL at 14:08

## 2018-03-18 NOTE — ED NOTES
Pt ambulates to 63 YEL in [facility].  Pt reports right hip pain radiating down right leg.  Pt also reports she is out of her medications.

## 2018-03-18 NOTE — ED NOTES
Pt provided with discharge instructions, prescriptions x4, instructions for follow up appointment with PCP and Neurosurgery, s/s of when to seek emergency care.  Pt verbalizes understanding.  Pt discharged in good condition.

## 2018-03-18 NOTE — ED NOTES
Pt rambling in triage. Pt also states had recent MRI last November.  Denies SI or HI. Pt states also that she would like to speak with
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
## 2018-03-18 NOTE — DISCHARGE INSTRUCTIONS
Sciatica  Introduction  Sciatica is pain, numbness, weakness, or tingling along your sciatic nerve. The sciatic nerve starts in the lower back and goes down the back of each leg. Sciatica happens when this nerve is pinched or has pressure put on it. Sciatica usually goes away on its own or with treatment. Sometimes, sciatica may keep coming back (recur).  Follow these instructions at home:  Medicines  · Take over-the-counter and prescription medicines only as told by your doctor.  · Do not drive or use heavy machinery while taking prescription pain medicine.  Managing pain  · If directed, put ice on the affected area.  ¨ Put ice in a plastic bag.  ¨ Place a towel between your skin and the bag.  ¨ Leave the ice on for 20 minutes, 2-3 times a day.  · After icing, apply heat to the affected area before you exercise or as often as told by your doctor. Use the heat source that your doctor tells you to use, such as a moist heat pack or a heating pad.  ¨ Place a towel between your skin and the heat source.  ¨ Leave the heat on for 20-30 minutes.  ¨ Remove the heat if your skin turns bright red. This is especially important if you are unable to feel pain, heat, or cold. You may have a greater risk of getting burned.  Activity  · Return to your normal activities as told by your doctor. Ask your doctor what activities are safe for you.  ¨ Avoid activities that make your sciatica worse.  · Take short rests during the day. Rest in a lying or standing position. This is usually better than sitting to rest.  ¨ When you rest for a long time, do some physical activity or stretching between periods of rest.  ¨ Avoid sitting for a long time without moving. Get up and move around at least one time each hour.  · Exercise and stretch regularly, as told by your doctor.  · Do not lift anything that is heavier than 10 lb (4.5 kg) while you have symptoms of sciatica.  ¨ Avoid lifting heavy things even when you do not have symptoms.  ¨ Avoid  lifting heavy things over and over.  · When you lift objects, always lift in a way that is safe for your body. To do this, you should:  ¨ Bend your knees.  ¨ Keep the object close to your body.  ¨ Avoid twisting.  General instructions  · Use good posture.  ¨ Avoid leaning forward when you are sitting.  ¨ Avoid hunching over when you are standing.  · Stay at a healthy weight.  · Wear comfortable shoes that support your feet. Avoid wearing high heels.  · Avoid sleeping on a mattress that is too soft or too hard. You might have less pain if you sleep on a mattress that is firm enough to support your back.  · Keep all follow-up visits as told by your doctor. This is important.  Contact a doctor if:  · You have pain that:  ¨ Wakes you up when you are sleeping.  ¨ Gets worse when you lie down.  ¨ Is worse than the pain you have had in the past.  ¨ Lasts longer than 4 weeks.  · You lose weight for without trying.  Get help right away if:  · You cannot control when you pee (urinate) or poop (have a bowel movement).  · You have weakness in any of these areas and it gets worse.  ¨ Lower back.  ¨ Lower belly (pelvis).  ¨ Butt (buttocks).  ¨ Legs.  · You have redness or swelling of your back.  · You have a burning feeling when you pee.  This information is not intended to replace advice given to you by your health care provider. Make sure you discuss any questions you have with your health care provider.  Document Released: 09/26/2009 Document Revised: 05/25/2017 Document Reviewed: 08/26/2016  © 2017 [employer]

## 2018-03-18 NOTE — ED NOTES
Pt upset because she states that ERP said he would re-fill klonopin.  ERP updated.  Prescription for klonopin given to pt.  Pt instructed to follow up with PCP for prescription refills in the future.

## 2018-03-18 NOTE — DISCHARGE PLANNING
I conferred with Ewelina López, , regarding what she had already given pt. I gave pt info re [employer].   Pt asked about the [facility]. I asked her to call [phone].

## 2018-03-18 NOTE — ED TRIAGE NOTES
"Pt ambulatory to triage c/o right sided hip pain and knee pain radiating down leg. Pt states has hx of chronic pain. Pt states has difficulty urinating as well. Pt states also has mental health \"issues\" and is out of medications. Pt states she is out of her medication  "

## 2018-03-18 NOTE — ED PROVIDER NOTES
ED Provider Note    Scribed for José Miguel Mims M.D. by Francisca Keith. 3/18/2018  12:08 PM    Primary care provider: Pcp Not In Computer  Means of arrival: walk in   History obtained from: patient   History limited by: none     CHIEF COMPLAINT  Chief Complaint   Patient presents with   • Pain       HPI  Nancy Olvera is a 43 y.o. female who presents to the Emergency Department with complaints of intermittent worsening right sided hip pain onset one week ago. Patient states her pain radiates down her right leg. She reports taking 400 mg of Ibuprofen 8 times per day with minimal relief of her pain. She reports associated melena. Patient states her pain is alleviated with walking. Patient reports she experienced a seizure one week ago. She states she ran out of her Depakote one month ago and Clonopin 3 weeks ago. Patient reports she would like to speak to life skills regarding her mental health as she has ran out of her medications. Patient denies SI.     Additionally, patient reports she has been experiencing urinary rentention. Patient has been using catheters for relief twice a day. No complaints of dysuria. No other acute complaints or concerns.     Review of past medical records shows the patient was seen here in November, 2017 for evaluation of low back pain and urinary retention. Patient had an MRI performed which indicated a large central herniated disk. Images were reviewed with Dr. Jurado who suggested no emergent interventions needed at that time and for the patient to follow up as an outpatient. She has not followed up with a physician yet.     REVIEW OF SYSTEMS  Pertinent positives include right hip pain, right leg pain, melena, seizure, urinary retention. Pertinent negatives include no dysuria, SI.    All other systems reviewed and negative.  C    PAST MEDICAL HISTORY   has a past medical history of ADHD (attention deficit hyperactivity disorder); Bipolar affective disorder (CMS-formerly [facility]); Cancer (CMS-formerly [facility]);  "Congestive heart failure (CMS-HCC); Hypertension; Psychiatric disorder; and Thyroid condition.    SURGICAL HISTORY   has a past surgical history that includes tonsillectomy.    SOCIAL HISTORY  Social History   Substance Use Topics   • Smoking status: Current Some Day Smoker     Packs/day: 0.50     Types: Cigarettes   • Smokeless tobacco: None noted    • Alcohol use No      History   Drug Use     Comment: medical marijuana       FAMILY HISTORY  No family history noted    CURRENT MEDICATIONS  Home Medications     Reviewed by Mine Montes R.N. (Registered Nurse) on 03/18/18 at 1145  Med List Status: Partial   Medication Last Dose Status   acetaminophen (TYLENOL) 325 MG Tab 10/14/2017 Active   albuterol (PROVENTIL) 2.5mg/3ml Nebu Soln solution for nebulization  Active   divalproex (DEPAKOTE) 250 MG Tablet Delayed Response 2/18/2018 Active   hydrocodone-acetaminophen (NORCO) 5-325 MG Tab per tablet  Active   levothyroxine (SYNTHROID) 75 MCG Tab 3/14/2018 Active   LORazepam (ATIVAN) 1 MG Tab  Active   nicotine (NICODERM) 14 MG/24HR PATCH 24 HR  Active   omeprazole (PRILOSEC) 40 MG delayed-release capsule unknown Active   ondansetron (ZOFRAN ODT) 4 MG TABLET DISPERSIBLE unknown Active   propranolol (INDERAL) 10 MG Tab 3/15/2018 Active   sucralfate (CARAFATE) 1 GM Tab unknown Active                ALLERGIES  Allergies   Allergen Reactions   • Aspirin Anaphylaxis   • Compazine Swelling   • Sulfa Drugs Rash   • Tetracyclines Swelling   • Ultram [Tramadol Hcl] Swelling   • Food      \"Green Peppers\"       PHYSICAL EXAM  VITAL SIGNS: /62   Pulse (!) 106   Temp 36.1 °C (97 °F)   Resp 16   Ht 1.727 m (5' 8\")   Wt 76.2 kg (167 lb 15.9 oz)   SpO2 100%   BMI 25.54 kg/m²     Constitutional: Well developed, Well nourished, mild distress, Non-toxic appearance.   HENT: Normocephalic, Atraumatic, Bilateral external ears normal, Oropharynx moist, No oral exudates.   Eyes: PERRLA, EOMI, Conjunctiva normal, No discharge. "   Neck: No tenderness, Supple, No stridor.   Lymphatic: No lymphadenopathy noted.   Cardiovascular: Normal heart rate, Normal rhythm.   Thorax & Lungs: Clear to auscultation bilaterally, No respiratory distress, No wheezing, No crackles.   Abdomen: Soft, No tenderness, No masses, No pulsatile masses.   Skin: Warm, Dry, No erythema, No rash. Large midline lower scar noted to back.   Extremities:, No edema. No cyanosis. Extremities are non tender, no erythema noted.  2+ pulses distally. Good muscle strength throughout.   Musculoskeletal: Intact distal pulses. Slight tenderness throughout the right gluteal area.   Neurologic: Awake, alert. Moves all extremities spontaneously.  Psychiatric: Pressured speech, anxious. Denies SI.     LABS  Labs Reviewed   CBC WITH DIFFERENTIAL - Abnormal; Notable for the following:        Result Value    RBC 4.02 (*)     Hemoglobin 11.7 (*)     MCHC 31.6 (*)     Eosinophils 16.30 (*)     Eos (Absolute) 1.56 (*)     All other components within normal limits    Narrative:     Indicate which anticoagulants the patient is on:->UNKNOWN   URINALYSIS,CULTURE IF INDICATED - Abnormal; Notable for the following:     Leukocyte Esterase Trace (*)     Occult Blood Large (*)     All other components within normal limits   URINE DRUG SCREEN - Abnormal; Notable for the following:     Cannabinoid Metab Positive (*)     All other components within normal limits   URINE MICROSCOPIC (W/UA) - Abnormal; Notable for the following:     RBC >150 (*)     All other components within normal limits   COMP METABOLIC PANEL    Narrative:     Indicate which anticoagulants the patient is on:->UNKNOWN   APTT    Narrative:     Indicate which anticoagulants the patient is on:->UNKNOWN   PROTHROMBIN TIME    Narrative:     Indicate which anticoagulants the patient is on:->UNKNOWN   DIAGNOSTIC ALCOHOL    Narrative:     Indicate which anticoagulants the patient is on:->UNKNOWN   URINE CULTURE(NEW)   ESTIMATED GFR    Narrative:      Indicate which anticoagulants the patient is on:->UNKNOWN     All labs reviewed by me.    COURSE & MEDICAL DECISION MAKING  Pertinent Labs & Imaging studies reviewed. (See chart for details)    Review of past medical records shows the patient was seen here in November, 2017 for evaluation of low back pain and urinary retention. Patient had an MRI performed which indicated a large central herniated disk. Images were reviewed with Dr. Jurado who suggested no emergent interventions needed at that time and for the patient to follow up as an outpatient.     12:08 PM - Patient seen and examined at bedside. Ordered APTT, PTT, urinalysis, bladder scan, diagnostic alcohol, urine drug screen, CBC, CMP to evaluate her symptoms.    1:43 PM- Reviewed the patient's lab and imaging results.     1:59 PM- Spoke with the alert team about the patient.     2:01 PM- Patient is stable for discharge. She will be discharged home with a prescription for Neurontin, Synthroid, Depakote, and Inderal. Instructed the patient on return to ED precautions and she agrees to be discharged home.      Decision Making:  Patient with pressured speech, has been off her psychiatric medicines for her next period of time coming and secondary to some radiculopathy type pain, right lower leg appears appropriate, reviewed MRI from previously, the patient does have a large central disc bulge. The patient's was to follow up with Dr. Jurado with neurosurgery. We will restart the patient's medications including her Neurontin, will increase her Neurontin 1200 mg 3 times a day, have gotten  and alert team to see the patient, will discharge patient home, have the patient return with any other concerns.    The patient will return for new or worsening symptoms and is stable at the time of discharge.    The patient is referred to a primary physician for blood pressure management, diabetic screening, and for all other preventative health  concerns.    DISPOSITION:  Patient will be discharged home in stable condition.    FOLLOW UP:  [facility], Emergency Dept  [address]  [place] [address] [phone]    If symptoms worsen    Aditya Jurado M.D.  [address]  [place] [address]  [phone]          OUTPATIENT MEDICATIONS:  Discharge Medication List as of 3/18/2018  3:15 PM        FINAL IMPRESSION  1. Lumbar radiculopathy    2. Medication refill    3. Anxiety        IFrancisca (Scribe), am scribing for, and in the presence of, José Miguel Mims M.D..    Electronically signed by: Francisca Keith (Kayla), 3/18/2018    José Miguel QURESHI M.D. personally performed the services described in this documentation, as scribed by Francisca Keith in my presence, and it is both accurate and complete.    The note accurately reflects work and decisions made by me.  José Miguel Mims  3/18/2018  6:58 PM

## 2018-03-20 LAB
BACTERIA UR CULT: ABNORMAL
BACTERIA UR CULT: ABNORMAL
SIGNIFICANT IND 70042: ABNORMAL
SITE SITE: ABNORMAL
SOURCE SOURCE: ABNORMAL

## 2018-03-20 NOTE — ED NOTES
"ED Positive Culture Follow-up/Notification Note:    Date: 3/20/18     Patient seen in the ED on 3/18/2018 for right sided hip pain.   1. Lumbar radiculopathy    2. Medication refill    3. Anxiety       Discharge Medication List as of 3/18/2018  3:15 PM          Allergies: Aspirin; Compazine; Sulfa drugs; Tetracyclines; Ultram [tramadol hcl]; and Food     Vitals:    03/18/18 1025 03/18/18 1031 03/18/18 1332 03/18/18 1410   BP: 120/62      Pulse: (!) 106  91 82   Resp: 16      Temp: 36.1 °C (97 °F)      SpO2: 100%  97% 98%   Weight:  76.2 kg (167 lb 15.9 oz)     Height: 1.727 m (5' 8\")          Final cultures:   Results     Procedure Component Value Units Date/Time    URINE CULTURE(NEW) [[id number]]  (Abnormal) Collected:  03/18/18 1300    Order Status:  Completed Specimen:  Urine Updated:  03/20/18 0625     Significant Indicator POS (POS)     Source UR     Site --     Urine Culture Mixed skin abiodun >100,000 cfu/mL (A)      Streptococcus agalactiae (Group B)  ,000 cfu/mL   (A)    URINALYSIS CULTURE, IF INDICATED [[id number]]  (Abnormal) Collected:  03/18/18 1300    Order Status:  Completed Specimen:  Urine Updated:  03/18/18 1342     Micro Urine Req Microscopic     Color Red     Character Clear     Specific Gravity 1.010     Ph 6.0     Glucose Negative mg/dL      Ketones Negative mg/dL      Protein Negative mg/dL      Bilirubin Negative     Urobilinogen, Urine 0.2     Nitrite Negative     Leukocyte Esterase Trace (A)     Occult Blood Large (A)     Culture Indicated Yes UA Culture     URINALYSIS,CULTURE IF INDICATED [[id number]]     Order Status:  Canceled Specimen:  Urine           Plan:   Group B Strep in the urine is a common urogenital colonizer and not likely a true urinary pathogen.  No need to treat with antimicrobials at this time.        Franny Paris    "

## 2018-11-13 ENCOUNTER — HOSPITAL ENCOUNTER (EMERGENCY)
Facility: MEDICAL CENTER | Age: 43
End: 2018-11-13
Payer: MEDICARE

## 2018-11-13 VITALS
OXYGEN SATURATION: 97 % | BODY MASS INDEX: 23.69 KG/M2 | HEART RATE: 95 BPM | HEIGHT: 68 IN | WEIGHT: 156.31 LBS | TEMPERATURE: 98.4 F | SYSTOLIC BLOOD PRESSURE: 126 MMHG | DIASTOLIC BLOOD PRESSURE: 87 MMHG | RESPIRATION RATE: 17 BRPM

## 2018-11-13 PROCEDURE — 302449 STATCHG TRIAGE ONLY (STATISTIC)

## 2018-11-13 NOTE — ED TRIAGE NOTES
".  Chief Complaint   Patient presents with   • Detox     pt states that she normally drinks 100ml daily of vodka, last drank 30 mins ago, and would like help to detox   • Medication Refill     pt states she needs gabapentin refilled for hx of seizures stating she last took it last friday     Patient ambulatory to triage for above complaints; A&O X4; NAD observed in triage however pt is extremely anxious and starts crying stating \"this is the first time I have ever admitting to how much I drink\". Pt denies SI/HI.    Patient placed in lobby and educated to notify staff of new or worsening symptoms.     "

## 2018-11-25 ENCOUNTER — HOSPITAL ENCOUNTER (EMERGENCY)
Facility: MEDICAL CENTER | Age: 43
End: 2018-11-27
Attending: EMERGENCY MEDICINE
Payer: MEDICARE

## 2018-11-25 DIAGNOSIS — F29 PSYCHOSIS, UNSPECIFIED PSYCHOSIS TYPE (HCC): ICD-10-CM

## 2018-11-25 LAB
AMPHET UR QL SCN: POSITIVE
APPEARANCE UR: CLEAR
BACTERIA #/AREA URNS HPF: NEGATIVE /HPF
BARBITURATES UR QL SCN: NEGATIVE
BENZODIAZ UR QL SCN: POSITIVE
BILIRUB UR QL STRIP.AUTO: NEGATIVE
BZE UR QL SCN: NEGATIVE
CANNABINOIDS UR QL SCN: POSITIVE
COLOR UR: YELLOW
EPI CELLS #/AREA URNS HPF: ABNORMAL /HPF
GLUCOSE UR STRIP.AUTO-MCNC: NEGATIVE MG/DL
HCG UR QL: NEGATIVE
HYALINE CASTS #/AREA URNS LPF: ABNORMAL /LPF
KETONES UR STRIP.AUTO-MCNC: ABNORMAL MG/DL
LEUKOCYTE ESTERASE UR QL STRIP.AUTO: ABNORMAL
METHADONE UR QL SCN: NEGATIVE
MICRO URNS: ABNORMAL
NITRITE UR QL STRIP.AUTO: NEGATIVE
OPIATES UR QL SCN: NEGATIVE
OXYCODONE UR QL SCN: NEGATIVE
PCP UR QL SCN: NEGATIVE
PH UR STRIP.AUTO: 6 [PH]
POC BREATHALIZER: 0 PERCENT (ref 0–0.01)
PROPOXYPH UR QL SCN: NEGATIVE
PROT UR QL STRIP: NEGATIVE MG/DL
RBC # URNS HPF: ABNORMAL /HPF
RBC UR QL AUTO: ABNORMAL
SP GR UR REFRACTOMETRY: 1.02
SP GR UR STRIP.AUTO: 1.02
UROBILINOGEN UR STRIP.AUTO-MCNC: 0.2 MG/DL
WBC #/AREA URNS HPF: ABNORMAL /HPF

## 2018-11-25 PROCEDURE — 81001 URINALYSIS AUTO W/SCOPE: CPT | Mod: XU

## 2018-11-25 PROCEDURE — 700111 HCHG RX REV CODE 636 W/ 250 OVERRIDE (IP): Performed by: EMERGENCY MEDICINE

## 2018-11-25 PROCEDURE — 700111 HCHG RX REV CODE 636 W/ 250 OVERRIDE (IP)

## 2018-11-25 PROCEDURE — A9270 NON-COVERED ITEM OR SERVICE: HCPCS | Performed by: EMERGENCY MEDICINE

## 2018-11-25 PROCEDURE — 80307 DRUG TEST PRSMV CHEM ANLYZR: CPT

## 2018-11-25 PROCEDURE — 90791 PSYCH DIAGNOSTIC EVALUATION: CPT

## 2018-11-25 PROCEDURE — 81025 URINE PREGNANCY TEST: CPT

## 2018-11-25 PROCEDURE — 96372 THER/PROPH/DIAG INJ SC/IM: CPT

## 2018-11-25 PROCEDURE — 700102 HCHG RX REV CODE 250 W/ 637 OVERRIDE(OP): Performed by: EMERGENCY MEDICINE

## 2018-11-25 PROCEDURE — 99285 EMERGENCY DEPT VISIT HI MDM: CPT

## 2018-11-25 PROCEDURE — 302970 POC BREATHALIZER: Performed by: EMERGENCY MEDICINE

## 2018-11-25 RX ORDER — ALBUTEROL SULFATE 90 UG/1
2 AEROSOL, METERED RESPIRATORY (INHALATION) EVERY 4 HOURS PRN
COMMUNITY
End: 2019-03-30

## 2018-11-25 RX ORDER — DIPHENHYDRAMINE HYDROCHLORIDE 50 MG/ML
25 INJECTION INTRAMUSCULAR; INTRAVENOUS ONCE
Status: COMPLETED | OUTPATIENT
Start: 2018-11-25 | End: 2018-11-25

## 2018-11-25 RX ORDER — OLANZAPINE 5 MG/1
20 TABLET, ORALLY DISINTEGRATING ORAL EVERY EVENING
Status: COMPLETED | OUTPATIENT
Start: 2018-11-25 | End: 2018-11-25

## 2018-11-25 RX ORDER — ACETAMINOPHEN 325 MG/1
650 TABLET ORAL ONCE
Status: COMPLETED | OUTPATIENT
Start: 2018-11-25 | End: 2018-11-25

## 2018-11-25 RX ORDER — ZIPRASIDONE MESYLATE 20 MG/ML
20 INJECTION, POWDER, LYOPHILIZED, FOR SOLUTION INTRAMUSCULAR ONCE
Status: COMPLETED | OUTPATIENT
Start: 2018-11-25 | End: 2018-11-25

## 2018-11-25 RX ORDER — DIVALPROEX SODIUM 250 MG/1
250 TABLET, EXTENDED RELEASE ORAL EVERY MORNING
Status: DISCONTINUED | OUTPATIENT
Start: 2018-11-26 | End: 2018-11-26

## 2018-11-25 RX ORDER — LORAZEPAM 1 MG/1
2 TABLET ORAL ONCE
Status: COMPLETED | OUTPATIENT
Start: 2018-11-25 | End: 2018-11-25

## 2018-11-25 RX ORDER — GABAPENTIN 300 MG/1
900 CAPSULE ORAL 3 TIMES DAILY
COMMUNITY
End: 2019-02-13

## 2018-11-25 RX ORDER — LORAZEPAM 2 MG/ML
1 INJECTION INTRAMUSCULAR ONCE
Status: COMPLETED | OUTPATIENT
Start: 2018-11-25 | End: 2018-11-25

## 2018-11-25 RX ORDER — DIPHENHYDRAMINE HYDROCHLORIDE 50 MG/ML
INJECTION INTRAMUSCULAR; INTRAVENOUS
Status: COMPLETED
Start: 2018-11-25 | End: 2018-11-25

## 2018-11-25 RX ORDER — HALOPERIDOL 5 MG/ML
10 INJECTION INTRAMUSCULAR ONCE
Status: COMPLETED | OUTPATIENT
Start: 2018-11-25 | End: 2018-11-25

## 2018-11-25 RX ORDER — LEVOTHYROXINE SODIUM 0.07 MG/1
75 TABLET ORAL
Status: DISCONTINUED | OUTPATIENT
Start: 2018-11-25 | End: 2018-11-27 | Stop reason: HOSPADM

## 2018-11-25 RX ORDER — DIPHENHYDRAMINE HYDROCHLORIDE 50 MG/ML
INJECTION INTRAMUSCULAR; INTRAVENOUS
Status: COMPLETED
Start: 2018-11-25 | End: 2018-11-26

## 2018-11-25 RX ORDER — GABAPENTIN 300 MG/1
900 CAPSULE ORAL 3 TIMES DAILY
Status: DISCONTINUED | OUTPATIENT
Start: 2018-11-25 | End: 2018-11-27 | Stop reason: HOSPADM

## 2018-11-25 RX ORDER — IBUPROFEN 200 MG
400 TABLET ORAL EVERY 4 HOURS PRN
COMMUNITY
End: 2019-07-30 | Stop reason: CLARIF

## 2018-11-25 RX ADMIN — LORAZEPAM 2 MG: 1 TABLET ORAL at 18:15

## 2018-11-25 RX ADMIN — DIPHENHYDRAMINE HYDROCHLORIDE 25 MG: 50 INJECTION INTRAMUSCULAR; INTRAVENOUS at 13:45

## 2018-11-25 RX ADMIN — DIVALPROEX SODIUM 750 MG: 500 TABLET, EXTENDED RELEASE ORAL at 17:34

## 2018-11-25 RX ADMIN — OLANZAPINE 20 MG: 5 TABLET, ORALLY DISINTEGRATING ORAL at 12:30

## 2018-11-25 RX ADMIN — GABAPENTIN 900 MG: 300 CAPSULE ORAL at 18:00

## 2018-11-25 RX ADMIN — LORAZEPAM 1 MG: 2 INJECTION INTRAMUSCULAR; INTRAVENOUS at 08:00

## 2018-11-25 RX ADMIN — ZIPRASIDONE MESYLATE 20 MG: 20 INJECTION, POWDER, LYOPHILIZED, FOR SOLUTION INTRAMUSCULAR at 08:00

## 2018-11-25 RX ADMIN — ACETAMINOPHEN 650 MG: 325 TABLET, FILM COATED ORAL at 17:34

## 2018-11-25 RX ADMIN — HALOPERIDOL LACTATE 10 MG: 5 INJECTION, SOLUTION INTRAMUSCULAR at 13:30

## 2018-11-25 RX ADMIN — LORAZEPAM 2 MG: 1 TABLET ORAL at 12:45

## 2018-11-25 ASSESSMENT — PAIN SCALES - GENERAL
PAINLEVEL_OUTOF10: 0
PAINLEVEL_OUTOF10: 0
PAINLEVEL_OUTOF10: 3

## 2018-11-25 ASSESSMENT — PAIN SCALES - WONG BAKER: WONGBAKER_NUMERICALRESPONSE: DOESN'T HURT AT ALL
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
## 2018-11-25 NOTE — ED NOTES
Patient's home medications have been reviewed by the pharmacy team.     Past Medical History:   Diagnosis Date   • ADHD (attention deficit hyperactivity disorder)    • Bipolar affective disorder (HCC)    • Cancer (HCC)     pt states she has colon cancer   • Congestive heart failure (HCC)    • Hypertension    • Psychiatric disorder    • Seizure disorder (HCC)    • Thyroid condition        Patient's Medications   New Prescriptions    No medications on file   Previous Medications    ALBUTEROL 108 (90 BASE) MCG/ACT AERO SOLN INHALATION AEROSOL    Inhale 2 Puffs by mouth every four hours as needed for Shortness of Breath.    GABAPENTIN (NEURONTIN) 300 MG CAP    Take 900 mg by mouth 3 times a day.    IBUPROFEN (MOTRIN) 200 MG TAB    Take 400 mg by mouth every four hours as needed (Pain).    LEVOTHYROXINE (SYNTHROID) 75 MCG TAB    Take 1 Tab by mouth Every morning on an empty stomach.    NICOTINE POLACRILEX (NICORETTE) 2 MG GUM    Take 2 mg by mouth every 2 hours as needed for Smoking Cessation.   Modified Medications    No medications on file   Discontinued Medications    ACETAMINOPHEN (TYLENOL) 325 MG TAB    Take 650 mg by mouth every four hours as needed.    ALBUTEROL (PROVENTIL) 2.5MG/3ML NEBU SOLN SOLUTION FOR NEBULIZATION    3 mL by Nebulization route every four hours as needed for Shortness of Breath.    DIVALPROEX (DEPAKOTE) 250 MG TABLET DELAYED RESPONSE    250 mg in morning  750 mg at bedtime    GABAPENTIN (NEURONTIN) 300 MG CAP    Take 4 Caps by mouth 3 times a day.    HYDROCODONE-ACETAMINOPHEN (NORCO) 5-325 MG TAB PER TABLET    Take 1 Tab by mouth every four hours as needed (pain).    LORAZEPAM (ATIVAN) 1 MG TAB    Take 1 mg by mouth 4 times a day.    NICOTINE (NICODERM) 14 MG/24HR PATCH 24 HR    Apply 1 Patch to skin as directed every 24 hours.    OMEPRAZOLE (PRILOSEC) 40 MG DELAYED-RELEASE CAPSULE    Take 40 mg by mouth every day.    ONDANSETRON (ZOFRAN ODT) 4 MG TABLET DISPERSIBLE    Take 4 mg by mouth every  8 hours as needed for Nausea/Vomiting (filled on 08/19/16).    PROPRANOLOL (INDERAL) 10 MG TAB    Take 1 Tab by mouth 2 times a day.    SUCRALFATE (CARAFATE) 1 GM TAB    Take 1 g by mouth 4 Times a Day,Before Meals and at Bedtime.          A:  Medications do not appear to be contributing to current complaints.   The patient has been off several psychiatric medications for over 2 weeks.   Divalproex was reordered by the  ERP of the Psych medications she has been noncompliant with.      P:    No recommendations at this time. Reordered the home medications that she has been compliant with.  Continue with divalproex.  Follow for Psychiatry assessment and plan.      Cora Han, Pharm.D., BCPS

## 2018-11-25 NOTE — ED PROVIDER NOTES
"ED Provider Note    CHIEF COMPLAINT  Chief Complaint   Patient presents with   • Off Psych Meds   • Manic Behavior   • Drug Abuse       HPI  Nancy Olvera is a 43 y.o. female who presents to the emergency department with erratic behavior.  She is a history of bipolar.  She is also been using methamphetamine.  She comes in highly agitated anxious.  Yelling disorganized bizarre statements.  She does admit to drinking alcohol in addition to her methamphetamine use.  She denies having a headache or new trauma.  No fever.  No abdominal pain.  Has chronic back pain which is unchanged.  No weakness in the legs.  States she has difficulty with urination and requested a straight catheterization, but reports that she has been urinating without catheterization at home.    REVIEW OF SYSTEMS  As per HPI, otherwise a 10 point review of systems is negative    PAST MEDICAL HISTORY  Past Medical History:   Diagnosis Date   • ADHD (attention deficit hyperactivity disorder)    • Bipolar affective disorder (HCC)    • Cancer (HCC)     pt states she has colon cancer   • Congestive heart failure (HCC)    • Hypertension    • Psychiatric disorder    • Seizure disorder (HCC)    • Thyroid condition        SOCIAL HISTORY  Social History   Substance Use Topics   • Smoking status: Current Some Day Smoker     Packs/day: 0.50     Types: Cigarettes   • Smokeless tobacco: Never Used   • Alcohol use Yes      Comment: 100ml vodka daily per pt       SURGICAL HISTORY  Past Surgical History:   Procedure Laterality Date   • TONSILLECTOMY         CURRENT MEDICATIONS  Home Medications    **Home medications have not yet been reviewed for this encounter**         ALLERGIES  Allergies   Allergen Reactions   • Aspirin Anaphylaxis   • Compazine Swelling   • Sulfa Drugs Rash   • Tetracyclines Swelling   • Ultram [Tramadol Hcl] Swelling   • Food      \"Green Peppers\"       PHYSICAL EXAM  VITAL SIGNS: /72   Pulse (!) 113   Temp 37.1 °C (98.7 °F) (Temporal)   " Resp 17   Wt 72.6 kg (160 lb)   SpO2 98%   BMI 24.33 kg/m²    Constitutional: Awake and alert.  Highly agitated yelling periodically.  Uncooperative.  HENT:  Atraumatic, Normocephalic.Oropharynx moist  Eyes: Normal inspection  Neck: Supple  Cardiovascular: Tachycardic heart rate, Normal rhythm.  Symmetric peripheral pulses.   Thorax & Lungs: No respiratory distress, No wheezing, No rales, No rhonchi, No chest tenderness.   Abdomen: Bowel sounds normal, soft, non-distended, nontender, no mass  Skin: Warm, Dry, No rash.   Extremities: No clubbing, cyanosis, edema, no Homans or cords   Neurologic: We had with good strength.  Psychiatric: Anxious appearing      Labs:  Results for orders placed or performed during the hospital encounter of 11/25/18   POC BREATHALIZER   Result Value Ref Range    POC Breathalizer 0.000 0.00 - 0.01 Percent       Medications   ziprasidone (GEODON) injection 20 mg (20 mg Intramuscular Given 11/25/18 0800)   LORazepam (ATIVAN) injection 1 mg (1 mg Intramuscular Given 11/25/18 0800)         COURSE & MEDICAL DECISION MAKING  Patient presents to the ER acutely psychotic.  She was agitated and threatening.  She appeared to be a danger to others as well as herself.  She required chemical sedation.  She was given Geodon and Ativan.  She reported that she could not urinate, but has been urinating fine at home.  She is ambulatory with good strength in her extremities without suggestion of spinal cord impingement syndrome.  She is afebrile.  I have ordered urinalysis and urine drug screen.  She clearly is not capable of caring for herself.  I have completed a legal hold.  She is medically stable for psychiatric referral.    FINAL IMPRESSION  1.  Acute psychosis      This dictation was created using voice recognition software. The accuracy of the dictation is limited to the abilities of the software.  The nursing notes were reviewed and certain aspects of this information were incorporated into this  note.      Electronically signed by: Ranjeet Hansen, 11/25/2018 9:32 AM

## 2018-11-25 NOTE — ED NOTES
Pt ambulated to bathroom.  Informed pt that a urine specimen is still needed.  Pt said she will provide

## 2018-11-25 NOTE — ED TRIAGE NOTES
"Pt brought in via EMS/PD/MOST on legal hold, meth use, off bipolar meds.  Pt is highly irritable, anxious, requesting ativan but refusing haldol when suggested stating \"Don't ernesto me that shit, it killed my baby in [place]\"  MOST representative states she is on a legal hold for paranoia and erratic behavior.  "

## 2018-11-25 NOTE — ED NOTES
Pt states that she has a back injury and unable to urinate, pt requesting straight cath to be done.  ERP to be notified and will await further instruction

## 2018-11-25 NOTE — ED NOTES
Pt was lying in bed peacefully when she suddenly started pacing the room, hitting herself in the head and screaming for everyone to leave her alone.  ERP notified, meds ordered, security notified, meds given.

## 2018-11-25 NOTE — DISCHARGE PLANNING
Alert team note:  Attempted to start an assessment but she was very drowsy and unable to wake.  Will complete assessment when awake.

## 2018-11-25 NOTE — ED NOTES
"Pt standing in doorway at this time screaming. Pt yelling, \"I'm not crazy! This is what happens when you call your , he'll call me crazy all the time!\" pt medicated per MAR.  "

## 2018-11-25 NOTE — ED NOTES
Med Rec complete per Pt's S/O  Allergies Reviewed  No ABX in the last 30 days    Pt has not taken many of her medications in over 2 weeks  DEPAKOTE > 2 months  PROPRANOLOL > 3 weeks  SEROQUEL > 1 month  BUSPAR > 1 month    When asked Pt's  would not give dosages because he felt that her medications needed to be adjusted. Pt's  states that her medications have not been adjusted since she was a young teenager.

## 2018-11-26 LAB
ALBUMIN SERPL BCP-MCNC: 3.6 G/DL (ref 3.2–4.9)
ALBUMIN/GLOB SERPL: 1.6 G/DL
ALP SERPL-CCNC: 64 U/L (ref 30–99)
ALT SERPL-CCNC: 22 U/L (ref 2–50)
ANION GAP SERPL CALC-SCNC: 9 MMOL/L (ref 0–11.9)
AST SERPL-CCNC: 45 U/L (ref 12–45)
BASOPHILS # BLD AUTO: 0.4 % (ref 0–1.8)
BASOPHILS # BLD: 0.02 K/UL (ref 0–0.12)
BILIRUB SERPL-MCNC: 0.6 MG/DL (ref 0.1–1.5)
BUN SERPL-MCNC: 8 MG/DL (ref 8–22)
CALCIUM SERPL-MCNC: 9 MG/DL (ref 8.5–10.5)
CHLORIDE SERPL-SCNC: 104 MMOL/L (ref 96–112)
CO2 SERPL-SCNC: 25 MMOL/L (ref 20–33)
CREAT SERPL-MCNC: 0.81 MG/DL (ref 0.5–1.4)
EOSINOPHIL # BLD AUTO: 0.28 K/UL (ref 0–0.51)
EOSINOPHIL NFR BLD: 6.2 % (ref 0–6.9)
ERYTHROCYTE [DISTWIDTH] IN BLOOD BY AUTOMATED COUNT: 59.4 FL (ref 35.9–50)
GLOBULIN SER CALC-MCNC: 2.2 G/DL (ref 1.9–3.5)
GLUCOSE SERPL-MCNC: 124 MG/DL (ref 65–99)
HCT VFR BLD AUTO: 40.1 % (ref 37–47)
HGB BLD-MCNC: 13.5 G/DL (ref 12–16)
IMM GRANULOCYTES # BLD AUTO: 0.01 K/UL (ref 0–0.11)
IMM GRANULOCYTES NFR BLD AUTO: 0.2 % (ref 0–0.9)
LYMPHOCYTES # BLD AUTO: 0.84 K/UL (ref 1–4.8)
LYMPHOCYTES NFR BLD: 18.5 % (ref 22–41)
MCH RBC QN AUTO: 30.1 PG (ref 27–33)
MCHC RBC AUTO-ENTMCNC: 33.7 G/DL (ref 33.6–35)
MCV RBC AUTO: 89.5 FL (ref 81.4–97.8)
MONOCYTES # BLD AUTO: 0.28 K/UL (ref 0–0.85)
MONOCYTES NFR BLD AUTO: 6.2 % (ref 0–13.4)
NEUTROPHILS # BLD AUTO: 3.1 K/UL (ref 2–7.15)
NEUTROPHILS NFR BLD: 68.5 % (ref 44–72)
NRBC # BLD AUTO: 0 K/UL
NRBC BLD-RTO: 0 /100 WBC
PLATELET # BLD AUTO: 108 K/UL (ref 164–446)
PMV BLD AUTO: 10.2 FL (ref 9–12.9)
POTASSIUM SERPL-SCNC: 3.7 MMOL/L (ref 3.6–5.5)
PROT SERPL-MCNC: 5.8 G/DL (ref 6–8.2)
RBC # BLD AUTO: 4.48 M/UL (ref 4.2–5.4)
SODIUM SERPL-SCNC: 138 MMOL/L (ref 135–145)
TSH SERPL DL<=0.005 MIU/L-ACNC: 1.13 UIU/ML (ref 0.38–5.33)
WBC # BLD AUTO: 4.5 K/UL (ref 4.8–10.8)

## 2018-11-26 PROCEDURE — 700102 HCHG RX REV CODE 250 W/ 637 OVERRIDE(OP): Performed by: EMERGENCY MEDICINE

## 2018-11-26 PROCEDURE — 99284 EMERGENCY DEPT VISIT MOD MDM: CPT | Performed by: PSYCHIATRY & NEUROLOGY

## 2018-11-26 PROCEDURE — A9270 NON-COVERED ITEM OR SERVICE: HCPCS | Performed by: PSYCHIATRY & NEUROLOGY

## 2018-11-26 PROCEDURE — A9270 NON-COVERED ITEM OR SERVICE: HCPCS | Performed by: EMERGENCY MEDICINE

## 2018-11-26 PROCEDURE — 80053 COMPREHEN METABOLIC PANEL: CPT

## 2018-11-26 PROCEDURE — 85025 COMPLETE CBC W/AUTO DIFF WBC: CPT

## 2018-11-26 PROCEDURE — 84443 ASSAY THYROID STIM HORMONE: CPT

## 2018-11-26 PROCEDURE — 700102 HCHG RX REV CODE 250 W/ 637 OVERRIDE(OP): Performed by: PSYCHIATRY & NEUROLOGY

## 2018-11-26 RX ORDER — NICOTINE 21 MG/24HR
1 PATCH, TRANSDERMAL 24 HOURS TRANSDERMAL ONCE
Status: COMPLETED | OUTPATIENT
Start: 2018-11-26 | End: 2018-11-27

## 2018-11-26 RX ORDER — RISPERIDONE 1 MG/1
1 TABLET ORAL 2 TIMES DAILY
Status: DISCONTINUED | OUTPATIENT
Start: 2018-11-26 | End: 2018-11-27 | Stop reason: HOSPADM

## 2018-11-26 RX ORDER — LORAZEPAM 1 MG/1
1 TABLET ORAL ONCE
Status: COMPLETED | OUTPATIENT
Start: 2018-11-26 | End: 2018-11-26

## 2018-11-26 RX ORDER — HYDROXYZINE PAMOATE 50 MG/1
50 CAPSULE ORAL EVERY 6 HOURS PRN
Status: DISCONTINUED | OUTPATIENT
Start: 2018-11-26 | End: 2018-11-27 | Stop reason: HOSPADM

## 2018-11-26 RX ADMIN — NICOTINE 14 MG: 14 PATCH, EXTENDED RELEASE TOPICAL at 14:03

## 2018-11-26 RX ADMIN — GABAPENTIN 900 MG: 300 CAPSULE ORAL at 18:49

## 2018-11-26 RX ADMIN — LORAZEPAM 1 MG: 1 TABLET ORAL at 10:53

## 2018-11-26 RX ADMIN — LEVOTHYROXINE SODIUM 75 MCG: 75 TABLET ORAL at 06:08

## 2018-11-26 RX ADMIN — GABAPENTIN 900 MG: 300 CAPSULE ORAL at 14:02

## 2018-11-26 RX ADMIN — RISPERIDONE 1 MG: 1 TABLET ORAL at 16:08

## 2018-11-26 RX ADMIN — DIVALPROEX SODIUM 250 MG: 250 TABLET, FILM COATED, EXTENDED RELEASE ORAL at 06:08

## 2018-11-26 RX ADMIN — GABAPENTIN 900 MG: 300 CAPSULE ORAL at 06:08
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
## 2018-11-26 NOTE — DISCHARGE PLANNING
Medical Social Work    Referral: Legal Hold    Intervention: Legal Hold Paperwork given to SW by [employer] RN: Sunitha    Legal Hold Initiated: Date: 11/25/2018  Time: 0645    Legal Hold faxed: Date: 11/25/2018  Time: 2131    Patient’s Insurance Listed on Face Sheet: Medicare and Medicaid FFS    Referrals sent to: [facility] and [facility]    Plan: Patient will transfer to mental health facility once acceptance is obtained.

## 2018-11-26 NOTE — ED NOTES
Patient's home medications have been reviewed by the pharmacy team.    Patient is here for ora associated with bipolar disorder and substance abuse.     Past Medical History:   Diagnosis Date   • ADHD (attention deficit hyperactivity disorder)    • Bipolar affective disorder (HCC)    • Cancer (HCC)     pt states she has colon cancer   • Congestive heart failure (HCC)    • Hypertension    • Psychiatric disorder    • Seizure disorder (HCC)    • Thyroid condition        Patient's Medications   New Prescriptions    No medications on file   Previous Medications    ALBUTEROL 108 (90 BASE) MCG/ACT AERO SOLN INHALATION AEROSOL    Inhale 2 Puffs by mouth every four hours as needed for Shortness of Breath.    GABAPENTIN (NEURONTIN) 300 MG CAP    Take 900 mg by mouth 3 times a day.    IBUPROFEN (MOTRIN) 200 MG TAB    Take 400 mg by mouth every four hours as needed (Pain).    LEVOTHYROXINE (SYNTHROID) 75 MCG TAB    Take 1 Tab by mouth Every morning on an empty stomach.    NICOTINE POLACRILEX (NICORETTE) 2 MG GUM    Take 2 mg by mouth every 2 hours as needed for Smoking Cessation.   Modified Medications    No medications on file   Discontinued Medications    ACETAMINOPHEN (TYLENOL) 325 MG TAB    Take 650 mg by mouth every four hours as needed.    ALBUTEROL (PROVENTIL) 2.5MG/3ML NEBU SOLN SOLUTION FOR NEBULIZATION    3 mL by Nebulization route every four hours as needed for Shortness of Breath.    DIVALPROEX (DEPAKOTE) 250 MG TABLET DELAYED RESPONSE    250 mg in morning  750 mg at bedtime    GABAPENTIN (NEURONTIN) 300 MG CAP    Take 4 Caps by mouth 3 times a day.    HYDROCODONE-ACETAMINOPHEN (NORCO) 5-325 MG TAB PER TABLET    Take 1 Tab by mouth every four hours as needed (pain).    LORAZEPAM (ATIVAN) 1 MG TAB    Take 1 mg by mouth 4 times a day.    NICOTINE (NICODERM) 14 MG/24HR PATCH 24 HR    Apply 1 Patch to skin as directed every 24 hours.    OMEPRAZOLE (PRILOSEC) 40 MG DELAYED-RELEASE CAPSULE    Take 40 mg by mouth every  day.    ONDANSETRON (ZOFRAN ODT) 4 MG TABLET DISPERSIBLE    Take 4 mg by mouth every 8 hours as needed for Nausea/Vomiting (filled on 08/19/16).    PROPRANOLOL (INDERAL) 10 MG TAB    Take 1 Tab by mouth 2 times a day.    SUCRALFATE (CARAFATE) 1 GM TAB    Take 1 g by mouth 4 Times a Day,Before Meals and at Bedtime.          A:  Anticholinergic medications such as diphenhydramine and haldol may contribute to urinary hesitancy.       P:    Avoid anticholinergic medications where possible if urinary hesitancy continues to be of issue for patient. Home medications have been reordered as appropriate.        Cindy Anderson, Pharmacy Intern

## 2018-11-26 NOTE — ED NOTES
Pt medicated per MAR    Social work notified of Pt request for [facility] to be [facility] over a meeting pt  Previously had scheduled for today.

## 2018-11-26 NOTE — ED NOTES
Pt's  (Vinod Moses:  [phone]) dropped off clothing and a house key to be stored with the pt's other belongings in locker # 14.

## 2018-11-26 NOTE — ED NOTES
"Pt ambulatory to shower, cooperative. Took shower and ambulated back to room. Requesting \"ciagrette\" patch\"  "

## 2018-11-26 NOTE — ED NOTES
Report from Diony VILLAR.     Pt resting in bed comfortably with NAD. Pt visible from nurses station and/or by sitter.

## 2018-11-26 NOTE — ED NOTES
Pt is currently pacing the room, manic speaking, anxious and agitated.  States she cannot eat regular food due to her dental pain.  Pt is also asking for more ativan to calm her nerves.  Will revital pt at this time and speak with ERP

## 2018-11-26 NOTE — DISCHARGE PLANNING
"Medical Social Work    MSW received a call from bedside RN who states that pt requested that her worker, Marilee Lora with the [facility] be contacted at: [phone] regarding and appointment pt reports she has today.  Pt will not inform nursing staff of when her appointment was and it's unclear whether it was pt's appointment or her , Vinod Moses's appointment.  When this worker contacts the above number the message indicates it is for \"healthcare for homeless 's\".  MSW did not leave a message and will provide information to FELIPE Han to contact the [facility] regarding pt's hospitalization as requested by pt.  "

## 2018-11-26 NOTE — ED NOTES
Pt up to restroom and back. Pt provided with snack and juice per pt request.     Pt back to sleeping.

## 2018-11-26 NOTE — ED NOTES
"Pt requesting Ativan, this nurse spoke with Dr. Wood who would like pt to take vistaril instead of Ativan. Vistaril ordered, pt refusing Vistaril states \"did these doctors go to school?? I take the ativan for my seizures, the vistaril makes my shaking worse and my heart race.\" Vistaril not given.   "

## 2018-11-26 NOTE — ED NOTES
Pt awake, asking to speak with  and wanting milk. Pt given milk and food tray. Pt agitated, not verbally or physically aggressive,  and ready to leave. Ambulated to restroom, then to nurses desk where she spoke with , felipa. Pt medicated with ativan for the anxiousness. Pt back in room sitting on stretcher reading magazine.

## 2018-11-27 VITALS
SYSTOLIC BLOOD PRESSURE: 104 MMHG | BODY MASS INDEX: 24.33 KG/M2 | HEART RATE: 100 BPM | RESPIRATION RATE: 20 BRPM | WEIGHT: 160 LBS | TEMPERATURE: 97.9 F | DIASTOLIC BLOOD PRESSURE: 76 MMHG | OXYGEN SATURATION: 97 %

## 2018-11-27 PROCEDURE — 700102 HCHG RX REV CODE 250 W/ 637 OVERRIDE(OP): Performed by: PSYCHIATRY & NEUROLOGY

## 2018-11-27 PROCEDURE — 700102 HCHG RX REV CODE 250 W/ 637 OVERRIDE(OP): Performed by: EMERGENCY MEDICINE

## 2018-11-27 PROCEDURE — A9270 NON-COVERED ITEM OR SERVICE: HCPCS | Performed by: EMERGENCY MEDICINE

## 2018-11-27 PROCEDURE — A9270 NON-COVERED ITEM OR SERVICE: HCPCS | Performed by: PSYCHIATRY & NEUROLOGY

## 2018-11-27 RX ORDER — PROPRANOLOL HYDROCHLORIDE 10 MG/1
10 TABLET ORAL EVERY 8 HOURS
Status: DISCONTINUED | OUTPATIENT
Start: 2018-11-27 | End: 2018-11-27 | Stop reason: HOSPADM

## 2018-11-27 RX ORDER — ACETAMINOPHEN 325 MG/1
650 TABLET ORAL ONCE
Status: COMPLETED | OUTPATIENT
Start: 2018-11-27 | End: 2018-11-27

## 2018-11-27 RX ADMIN — ACETAMINOPHEN 650 MG: 325 TABLET, FILM COATED ORAL at 09:22

## 2018-11-27 RX ADMIN — LEVOTHYROXINE SODIUM 75 MCG: 75 TABLET ORAL at 06:00

## 2018-11-27 RX ADMIN — GABAPENTIN 900 MG: 300 CAPSULE ORAL at 06:23

## 2018-11-27 RX ADMIN — PROPRANOLOL HYDROCHLORIDE 10 MG: 10 TABLET ORAL at 09:22

## 2018-11-27 NOTE — ED NOTES
Patient states that she is having lower back pain as well as a toothache on left side of face, patient has steady gait, patient is tachycardic at 126 states that she typically on propranolol and has not been getting it while in ER

## 2018-11-27 NOTE — ED NOTES
Vss. meds per mar. Requesting decrease in strength of nicotine patch to 7mg. & requesting room c tv. Sitter outside of room. Denies any needs. Will ctm.

## 2018-11-27 NOTE — ED PROVIDER NOTES
ED Provider Note    8:55 AM -this is a 43-year-old female here on legal hold who is currently awaiting transfer to a psychiatric facility.  She does have some chronic low back pain and some fairly chronic bilateral molar pain.  The patient is controlling her secretions, she has normal speech.  Currently she is sleeping comfortably.  She also has a resting tachycardia and is supposed to be on propranolol 10 mg 3 times daily which she has not been taking since she has arrived to the emergency department.  I reviewed the prior medical record, the patient does indeed have a resting tachycardia, on prior presentations her heart rate is routinely around 115.  Her heart rate today has gradually increased throughout her ED course with a max of 126 currently.  She did have some lab evaluation on arrival did not show any signs of sepsis.  I suspect this is due to her underlying tachycardia.  She will be given her home dose of propranolol and reassess following this.  4:30 PM -patient has been stable throughout my shift, heart rate has improved with propranolol.

## 2018-11-27 NOTE — DISCHARGE PLANNING
Janet from [facility] Called they will accept Pt.  Dr. Peter will be admitting physician.    MTM called and Transportation arranged with Katy  PCS completed and faxed to [facility].  [id number] Transport time arranged with [facility]    Transfer Packet completed along with original Legal Hold placed inside  RN updated on transfer and transfer time    Janet at [facility] notified of [facility]  time.

## 2019-02-13 ENCOUNTER — HOSPITAL ENCOUNTER (EMERGENCY)
Facility: MEDICAL CENTER | Age: 44
End: 2019-02-13
Attending: EMERGENCY MEDICINE
Payer: MEDICARE

## 2019-02-13 ENCOUNTER — APPOINTMENT (OUTPATIENT)
Dept: RADIOLOGY | Facility: MEDICAL CENTER | Age: 44
End: 2019-02-13
Attending: EMERGENCY MEDICINE
Payer: MEDICARE

## 2019-02-13 VITALS
DIASTOLIC BLOOD PRESSURE: 81 MMHG | TEMPERATURE: 98.2 F | SYSTOLIC BLOOD PRESSURE: 131 MMHG | RESPIRATION RATE: 16 BRPM | OXYGEN SATURATION: 98 % | WEIGHT: 166.67 LBS | HEART RATE: 89 BPM | BODY MASS INDEX: 25.34 KG/M2

## 2019-02-13 DIAGNOSIS — M54.5 LOW BACK PAIN, UNSPECIFIED BACK PAIN LATERALITY, UNSPECIFIED CHRONICITY, WITH SCIATICA PRESENCE UNSPECIFIED: ICD-10-CM

## 2019-02-13 DIAGNOSIS — R10.9 ABDOMINAL PAIN, UNSPECIFIED ABDOMINAL LOCATION: ICD-10-CM

## 2019-02-13 DIAGNOSIS — Z87.898 HISTORY OF SEIZURE: ICD-10-CM

## 2019-02-13 LAB
ALBUMIN SERPL BCP-MCNC: 3.9 G/DL (ref 3.2–4.9)
ALBUMIN/GLOB SERPL: 1.8 G/DL
ALP SERPL-CCNC: 69 U/L (ref 30–99)
ALT SERPL-CCNC: 16 U/L (ref 2–50)
ANION GAP SERPL CALC-SCNC: 6 MMOL/L (ref 0–11.9)
APPEARANCE UR: ABNORMAL
AST SERPL-CCNC: 17 U/L (ref 12–45)
BACTERIA #/AREA URNS HPF: NEGATIVE /HPF
BASOPHILS # BLD AUTO: 0.5 % (ref 0–1.8)
BASOPHILS # BLD: 0.03 K/UL (ref 0–0.12)
BILIRUB SERPL-MCNC: 0.5 MG/DL (ref 0.1–1.5)
BILIRUB UR QL STRIP.AUTO: NEGATIVE
BUN SERPL-MCNC: 12 MG/DL (ref 8–22)
CALCIUM SERPL-MCNC: 9.5 MG/DL (ref 8.5–10.5)
CHLORIDE SERPL-SCNC: 108 MMOL/L (ref 96–112)
CO2 SERPL-SCNC: 26 MMOL/L (ref 20–33)
COLOR UR: YELLOW
CREAT SERPL-MCNC: 0.79 MG/DL (ref 0.5–1.4)
EKG IMPRESSION: NORMAL
EOSINOPHIL # BLD AUTO: 0.95 K/UL (ref 0–0.51)
EOSINOPHIL NFR BLD: 16.5 % (ref 0–6.9)
EPI CELLS #/AREA URNS HPF: ABNORMAL /HPF
ERYTHROCYTE [DISTWIDTH] IN BLOOD BY AUTOMATED COUNT: 59.4 FL (ref 35.9–50)
GLOBULIN SER CALC-MCNC: 2.2 G/DL (ref 1.9–3.5)
GLUCOSE SERPL-MCNC: 92 MG/DL (ref 65–99)
GLUCOSE UR STRIP.AUTO-MCNC: NEGATIVE MG/DL
HCG SERPL QL: NEGATIVE
HCT VFR BLD AUTO: 42.1 % (ref 37–47)
HGB BLD-MCNC: 13.4 G/DL (ref 12–16)
IMM GRANULOCYTES # BLD AUTO: 0.01 K/UL (ref 0–0.11)
IMM GRANULOCYTES NFR BLD AUTO: 0.2 % (ref 0–0.9)
KETONES UR STRIP.AUTO-MCNC: NEGATIVE MG/DL
LEUKOCYTE ESTERASE UR QL STRIP.AUTO: ABNORMAL
LIPASE SERPL-CCNC: 62 U/L (ref 11–82)
LYMPHOCYTES # BLD AUTO: 1.23 K/UL (ref 1–4.8)
LYMPHOCYTES NFR BLD: 21.4 % (ref 22–41)
MCH RBC QN AUTO: 29.8 PG (ref 27–33)
MCHC RBC AUTO-ENTMCNC: 31.8 G/DL (ref 33.6–35)
MCV RBC AUTO: 93.6 FL (ref 81.4–97.8)
MICRO URNS: ABNORMAL
MONOCYTES # BLD AUTO: 0.35 K/UL (ref 0–0.85)
MONOCYTES NFR BLD AUTO: 6.1 % (ref 0–13.4)
MUCOUS THREADS #/AREA URNS HPF: ABNORMAL /HPF
NEUTROPHILS # BLD AUTO: 3.18 K/UL (ref 2–7.15)
NEUTROPHILS NFR BLD: 55.3 % (ref 44–72)
NITRITE UR QL STRIP.AUTO: NEGATIVE
NRBC # BLD AUTO: 0 K/UL
NRBC BLD-RTO: 0 /100 WBC
PH UR STRIP.AUTO: 6.5 [PH]
PLATELET # BLD AUTO: 186 K/UL (ref 164–446)
PMV BLD AUTO: 10.5 FL (ref 9–12.9)
POTASSIUM SERPL-SCNC: 4 MMOL/L (ref 3.6–5.5)
PROT SERPL-MCNC: 6.1 G/DL (ref 6–8.2)
PROT UR QL STRIP: NEGATIVE MG/DL
RBC # BLD AUTO: 4.5 M/UL (ref 4.2–5.4)
RBC # URNS HPF: ABNORMAL /HPF
RBC UR QL AUTO: NEGATIVE
SODIUM SERPL-SCNC: 140 MMOL/L (ref 135–145)
SP GR UR STRIP.AUTO: 1.02
UROBILINOGEN UR STRIP.AUTO-MCNC: 0.2 MG/DL
VALPROATE SERPL-MCNC: 42.2 UG/ML (ref 50–100)
WBC # BLD AUTO: 5.8 K/UL (ref 4.8–10.8)
WBC #/AREA URNS HPF: ABNORMAL /HPF

## 2019-02-13 PROCEDURE — 36415 COLL VENOUS BLD VENIPUNCTURE: CPT

## 2019-02-13 PROCEDURE — 700101 HCHG RX REV CODE 250: Performed by: EMERGENCY MEDICINE

## 2019-02-13 PROCEDURE — 81001 URINALYSIS AUTO W/SCOPE: CPT

## 2019-02-13 PROCEDURE — 99285 EMERGENCY DEPT VISIT HI MDM: CPT

## 2019-02-13 PROCEDURE — 700102 HCHG RX REV CODE 250 W/ 637 OVERRIDE(OP): Performed by: EMERGENCY MEDICINE

## 2019-02-13 PROCEDURE — 700102 HCHG RX REV CODE 250 W/ 637 OVERRIDE(OP)

## 2019-02-13 PROCEDURE — 84703 CHORIONIC GONADOTROPIN ASSAY: CPT

## 2019-02-13 PROCEDURE — 700111 HCHG RX REV CODE 636 W/ 250 OVERRIDE (IP)

## 2019-02-13 PROCEDURE — A9270 NON-COVERED ITEM OR SERVICE: HCPCS | Performed by: EMERGENCY MEDICINE

## 2019-02-13 PROCEDURE — A9270 NON-COVERED ITEM OR SERVICE: HCPCS

## 2019-02-13 PROCEDURE — 80164 ASSAY DIPROPYLACETIC ACD TOT: CPT

## 2019-02-13 PROCEDURE — 96374 THER/PROPH/DIAG INJ IV PUSH: CPT

## 2019-02-13 PROCEDURE — 85025 COMPLETE CBC W/AUTO DIFF WBC: CPT

## 2019-02-13 PROCEDURE — 93005 ELECTROCARDIOGRAM TRACING: CPT | Performed by: EMERGENCY MEDICINE

## 2019-02-13 PROCEDURE — 83690 ASSAY OF LIPASE: CPT

## 2019-02-13 PROCEDURE — 80053 COMPREHEN METABOLIC PANEL: CPT

## 2019-02-13 PROCEDURE — 74177 CT ABD & PELVIS W/CONTRAST: CPT

## 2019-02-13 PROCEDURE — 700117 HCHG RX CONTRAST REV CODE 255: Performed by: EMERGENCY MEDICINE

## 2019-02-13 RX ORDER — LEVOTHYROXINE SODIUM 0.07 MG/1
75 TABLET ORAL
Qty: 7 TAB | Refills: 0 | Status: SHIPPED | OUTPATIENT
Start: 2019-02-13 | End: 2019-02-20

## 2019-02-13 RX ORDER — LIDOCAINE 50 MG/G
1 PATCH TOPICAL ONCE
Status: DISCONTINUED | OUTPATIENT
Start: 2019-02-13 | End: 2019-02-13 | Stop reason: HOSPADM

## 2019-02-13 RX ORDER — DIVALPROEX SODIUM 250 MG/1
250 TABLET, EXTENDED RELEASE ORAL DAILY
Qty: 7 TAB | Refills: 0 | Status: SHIPPED | OUTPATIENT
Start: 2019-02-13 | End: 2019-02-20

## 2019-02-13 RX ORDER — DIVALPROEX SODIUM 250 MG/1
750 TABLET, EXTENDED RELEASE ORAL
Qty: 21 TAB | Refills: 0 | Status: SHIPPED | OUTPATIENT
Start: 2019-02-13 | End: 2019-02-20

## 2019-02-13 RX ORDER — NICOTINE 21 MG/24HR
1 PATCH, TRANSDERMAL 24 HOURS TRANSDERMAL ONCE
Status: DISCONTINUED | OUTPATIENT
Start: 2019-02-13 | End: 2019-02-13 | Stop reason: HOSPADM

## 2019-02-13 RX ORDER — ALUMINA, MAGNESIA, AND SIMETHICONE 2400; 2400; 240 MG/30ML; MG/30ML; MG/30ML
10 SUSPENSION ORAL 4 TIMES DAILY PRN
Qty: 560 ML | Refills: 0 | Status: SHIPPED | OUTPATIENT
Start: 2019-02-13 | End: 2019-07-30 | Stop reason: CLARIF

## 2019-02-13 RX ORDER — ONDANSETRON 2 MG/ML
4 INJECTION INTRAMUSCULAR; INTRAVENOUS ONCE
Status: COMPLETED | OUTPATIENT
Start: 2019-02-13 | End: 2019-02-13

## 2019-02-13 RX ORDER — LORAZEPAM 1 MG/1
1 TABLET ORAL ONCE
Status: COMPLETED | OUTPATIENT
Start: 2019-02-13 | End: 2019-02-13

## 2019-02-13 RX ORDER — SUCRALFATE 1 G/1
1 TABLET ORAL
Qty: 28 TAB | Refills: 0 | Status: SHIPPED | OUTPATIENT
Start: 2019-02-13 | End: 2019-02-20

## 2019-02-13 RX ORDER — OLANZAPINE 20 MG/1
20 TABLET ORAL DAILY
Qty: 7 TAB | Refills: 0 | Status: SHIPPED | OUTPATIENT
Start: 2019-02-13 | End: 2019-02-20

## 2019-02-13 RX ORDER — GABAPENTIN 300 MG/1
900 CAPSULE ORAL 3 TIMES DAILY
Qty: 63 CAP | Refills: 0 | Status: SHIPPED | OUTPATIENT
Start: 2019-02-13 | End: 2019-02-20

## 2019-02-13 RX ORDER — LIDOCAINE 50 MG/G
1 PATCH TOPICAL EVERY 24 HOURS
Qty: 10 PATCH | Refills: 0 | Status: SHIPPED | OUTPATIENT
Start: 2019-02-13 | End: 2019-07-30 | Stop reason: CLARIF

## 2019-02-13 RX ORDER — OXYCODONE HYDROCHLORIDE 5 MG/1
5 TABLET ORAL
Status: COMPLETED | OUTPATIENT
Start: 2019-02-13 | End: 2019-02-13

## 2019-02-13 RX ORDER — PROPRANOLOL HYDROCHLORIDE 20 MG/1
20 TABLET ORAL 2 TIMES DAILY
Qty: 14 TAB | Refills: 0 | Status: SHIPPED | OUTPATIENT
Start: 2019-02-13 | End: 2019-02-20

## 2019-02-13 RX ORDER — GABAPENTIN 300 MG/1
900 CAPSULE ORAL ONCE
Status: COMPLETED | OUTPATIENT
Start: 2019-02-13 | End: 2019-02-13

## 2019-02-13 RX ADMIN — IOHEXOL 100 ML: 350 INJECTION, SOLUTION INTRAVENOUS at 17:06

## 2019-02-13 RX ADMIN — ONDANSETRON 4 MG: 2 INJECTION INTRAMUSCULAR; INTRAVENOUS at 16:08

## 2019-02-13 RX ADMIN — GABAPENTIN 900 MG: 300 CAPSULE ORAL at 15:38

## 2019-02-13 RX ADMIN — OXYCODONE HYDROCHLORIDE 5 MG: 5 TABLET ORAL at 15:38

## 2019-02-13 RX ADMIN — LORAZEPAM 1 MG: 1 TABLET ORAL at 16:47

## 2019-02-13 RX ADMIN — LIDOCAINE 1 PATCH: 50 PATCH CUTANEOUS at 17:51

## 2019-02-13 RX ADMIN — NICOTINE TRANSDERMAL SYSTEM 1 PATCH: 21 PATCH, EXTENDED RELEASE TRANSDERMAL at 16:58

## 2019-02-13 ASSESSMENT — LIFESTYLE VARIABLES
EVER FELT BAD OR GUILTY ABOUT YOUR DRINKING: YES
HAVE PEOPLE ANNOYED YOU BY CRITICIZING YOUR DRINKING: YES
DOES PATIENT WANT TO TALK TO SOMEONE ABOUT QUITTING: YES
DOES PATIENT WANT TO STOP DRINKING: YES
EVER HAD A DRINK FIRST THING IN THE MORNING TO STEADY YOUR NERVES TO GET RID OF A HANGOVER: YES
HAVE YOU EVER FELT YOU SHOULD CUT DOWN ON YOUR DRINKING: YES
TOTAL SCORE: 4
HOW MANY TIMES IN THE PAST YEAR HAVE YOU HAD 5 OR MORE DRINKS IN A DAY: 365
TOTAL SCORE: 4
AVERAGE NUMBER OF DAYS PER WEEK YOU HAVE A DRINK CONTAINING ALCOHOL: 7
DO YOU DRINK ALCOHOL: YES
CONSUMPTION TOTAL: INCOMPLETE
TOTAL SCORE: 4

## 2019-02-13 NOTE — ED NOTES
PIV inserted. Blood drawn and sent to lab. Urine specimen obtained and sent to lab.     States she is an alcoholic, last drink 14:00 yesterday. States + seizure yesterday, hx of epilepsy. States back hurts due to fall from seizure. Has + N/V and c/o abd pain. Concerned for pancreatitis. Requesting assistance with detox.     ERP at bedside.

## 2019-02-13 NOTE — ED TRIAGE NOTES
43 y/o female ambulatory to triage with c/o multiple complaints including low back pain, medication refill, dental pain and referral to psych doctor, pt also states she has blood in her stool and believes she has pancreatitis as well. Pt states she has been drinking for the pain because she does not have pain medication.

## 2019-02-13 NOTE — ED PROVIDER NOTES
"ED Provider Note    Scribed for Daniel Alexis M.D. by Tyler Tristan. 2/13/2019,  3:18 PM.    Means of Arrival: Walk-in  History obtained from: Patient  History limited by: None    CHIEF COMPLAINT  Chief Complaint   Patient presents with   • Low Back Pain   • Medication Refill   • Off Psych Meds   • Dental Pain   • Abdominal Pain       HPI  Nancy Olvera is a 44 y.o. female who presents to the Emergency Department for lower back pain onset last night. Patient says she suffered a 30 second witnessed seizure last night where became extremely stiff and she fell to the floor. Since the incident, she reports lower back and lower abdominal pain with shooting pains down her bilateral legs. She describes her pain as \"feeling like someone is punching me\" and treatment with Tylenol did not offer relief. Patient has also been experiencing vomiting and her last episode was 1 hour ago. This was her first seizure in 10 years and she normally takes Depakote for treatment but she recently ran out of her prescription. She has not attempted to refill the prescription as she \"does not get along\" with her primary care physician, Dr. Colbert. Patient says she drinks a lot of alcohol but she recently quit and believes her seizure was secondary to withdrawals.    Patient reports a history of chronic back problems and she has undergone a spinal surgery in the past. She was supposed to have an additional back surgery but denies following up. She has drug allergies to compazine, aspirin, and sulfa drugs.     REVIEW OF SYSTEMS  GASTROINTESTINAL:  Lower abdominal pain, vomiting. No stool incontinence, diarrhea.   MUSCULOSKELETAL:  Lower back pain, bilateral leg pain  NEUROLOGIC:   Seizure.  PSYCHIATRIC: No suicidal ideation, homicidal ideation.     See HPI for further details.   All other systems are negative.     PAST MEDICAL HISTORY  Past Medical History:   Diagnosis Date   • ADHD (attention deficit hyperactivity disorder)    • Bipolar " "affective disorder (HCC)    • Cancer (HCC)     pt states she has colon cancer   • Congestive heart failure (HCC)    • Hypertension    • Psychiatric disorder    • Seizure disorder (HCC)    • Thyroid condition        FAMILY HISTORY  No pertinent family history    SOCIAL HISTORY   reports that she has been smoking Cigarettes.  She has been smoking about 0.50 packs per day. She has never used smokeless tobacco. She reports that she drinks alcohol. She reports that she uses drugs.    SURGICAL HISTORY  Past Surgical History:   Procedure Laterality Date   • TONSILLECTOMY         CURRENT MEDICATIONS    Current Outpatient Prescriptions on File Prior to Encounter   Medication Sig Dispense Refill   • gabapentin (NEURONTIN) 300 MG Cap Take 900 mg by mouth 3 times a day.     • ibuprofen (MOTRIN) 200 MG Tab Take 400 mg by mouth every four hours as needed (Pain).     • albuterol 108 (90 Base) MCG/ACT Aero Soln inhalation aerosol Inhale 2 Puffs by mouth every four hours as needed for Shortness of Breath.     • nicotine polacrilex (NICORETTE) 2 MG Gum Take 2 mg by mouth every 2 hours as needed for Smoking Cessation.     • levothyroxine (SYNTHROID) 75 MCG Tab Take 1 Tab by mouth Every morning on an empty stomach. 10 Tab 0      ALLERGIES  Allergies   Allergen Reactions   • Aspirin Anaphylaxis   • Compazine Swelling   • Sulfa Drugs Rash   • Tetracyclines Swelling   • Ultram [Tramadol Hcl] Swelling   • Food      \"Green Peppers\"       PHYSICAL EXAM  VITAL SIGNS: /81   Pulse 95   Temp 36.4 °C (97.5 °F) (Temporal)   Resp 16   Wt 75.6 kg (166 lb 10.7 oz)   SpO2 98%   BMI 25.34 kg/m²    Gen: Alert, no acute distress  HEENT: ATNC  Eyes: PERRL, EOMI, normal conjunctiva.   Neck: trachea midline  Resp: no respiratory distress  CV: No JVD  Abd: non-distended  MSK: Minimal lumbar spinal tenderness without deformities. Surgical scar on lumbar spine  Ext: No deformities  Psych: normal mood  Neuro: speech fluent. 5/5 strength to toe dorsi " flexion and extension, foot dorsi flexion and extension, knee extension, knee flexion, and hip flexion. Sensation in tact to bilateral lower extremities    DIAGNOSTIC STUDIES / PROCEDURES     EKG  EKG reviewed as shown below.     LABS  Labs Reviewed   CBC WITH DIFFERENTIAL - Abnormal; Notable for the following:        Result Value    MCHC 31.8 (*)     RDW 59.4 (*)     Lymphocytes 21.40 (*)     Eosinophils 16.50 (*)     Eos (Absolute) 0.95 (*)     All other components within normal limits   URINALYSIS,CULTURE IF INDICATED - Abnormal; Notable for the following:     Character Hazy (*)     Leukocyte Esterase Small (*)     All other components within normal limits   URINE MICROSCOPIC (W/UA) - Abnormal; Notable for the following:     WBC 5-10 (*)     Epithelial Cells Many (*)     All other components within normal limits   VALPROIC ACID - Abnormal; Notable for the following:     Valproic Acid 42.2 (*)     All other components within normal limits   COMP METABOLIC PANEL   LIPASE   HCG QUAL SERUM   ESTIMATED GFR     All labs reviewed by me.    RADIOLOGY  CT-ABDOMEN-PELVIS WITH   Final Result      5.7 mm hypodensity in the left hepatic lobe which could represent a cyst or hemangioma.      Gallbladder not visible.      Moderate-sized retrocardiac hiatal hernia.        The radiologist’s interpretation of all radiology studies have been reviewed by me.    COURSE & MEDICAL DECISION MAKING  Pertinent Labs & Imaging studies reviewed. (See chart for details)    3:18 PM Patient seen and examined at bedside. Informed patient that work up would be conducted. Ordered for labs and imaging to evaluate. Patient will be treated with gabapentin 900 mg PO and oxycodone 5 mg PO for her symptoms.     Medical Decision Making:  Patient presents with low back pain, however no hard findings such as lower extremity weakness or loss of sensation to suggest cauda equina syndrome.  Patient has abdominal pain, concern for possible pancreatitis, will  check labs, CT scan of the abdomen and pelvis.  Patient does not demonstrate disorganized behavior, no evidence of acute psychiatric decompensation.  Patient reports seizure yesterday, however does not appear to have evidence of alcohol withdrawal.  This is possibly from low concentrations of her antiseizure medications.  We will check valproate level.    Patient's labs demonstrate subtherapeutic valproate level, otherwise unremarkable.  CAT scan demonstrates no evidence of bony injury to the spine.  No evidence of pancreatitis.  Patient is clinically not in alcohol withdrawal at the time of evaluation.  She will be provided with a short course of her medications until she is able to arrange for follow-up.  We will treat her low back pain with lidocaine patches as the patient felt significantly improved after the patch was provided.      FINAL IMPRESSION  1. Low back pain, unspecified back pain laterality, unspecified chronicity, with sciatica presence unspecified    2. Abdominal pain, unspecified abdominal location    3. History of seizure            Tyler QURESHI (Deborahibsharmila), am scribing for, and in the presence of, Daniel Alexis M.D..    Electronically signed by: Tyler Tristan (Kayla), 2/13/2019    IDaniel M.D. personally performed the services described in this documentation, as scribed by Tyler Tristan in my presence, and it is both accurate and complete. C.    The note accurately reflects work and decisions made by me.  Daniel Alexis  2/13/2019  6:35 PM

## 2019-02-14 NOTE — ED NOTES
Received orders for DC. PIV removed and dressing applied. Educated regarding prescriptions and f/u. All questions addressed. VSS. Ambulatory to lobby with steady gait.

## 2019-02-14 NOTE — ED NOTES
Tearful and crying. C/o severe pain shooting down buttocks into posterior legs. Assisted with repositioning. Provided with ice pack.

## 2019-02-14 NOTE — DISCHARGE INSTRUCTIONS
He was seen in the emergency department for low back pain, abdominal pain, requesting referral to a psychiatric facility.  Your labs were reassuring.  The CAT scan of your abdomen did not demonstrate any pancreatitis, spinal fracture or other dangerous findings.  Your pain is most likely from muscular skeletal low back pain.  You are being provided with a prescription for home medications for 7 days.    You are being referred to the [facility].  Please follow-up with them regarding her behavioral health needs.    Please follow-up with your regular doctor.    Please return to the emergency department seek medical attention if you develop: Loss of sensation of your legs, inability to walk, severe progressive pain, inability to control your bowel movements, ongoing vomiting, severe progressive headache, other new or concerning findings.

## 2019-02-21 ENCOUNTER — APPOINTMENT (OUTPATIENT)
Dept: RADIOLOGY | Facility: MEDICAL CENTER | Age: 44
End: 2019-02-21
Attending: EMERGENCY MEDICINE
Payer: MEDICARE

## 2019-02-21 ENCOUNTER — HOSPITAL ENCOUNTER (EMERGENCY)
Facility: MEDICAL CENTER | Age: 44
End: 2019-02-21
Attending: EMERGENCY MEDICINE
Payer: MEDICARE

## 2019-02-21 VITALS
BODY MASS INDEX: 25.09 KG/M2 | RESPIRATION RATE: 20 BRPM | OXYGEN SATURATION: 97 % | SYSTOLIC BLOOD PRESSURE: 128 MMHG | DIASTOLIC BLOOD PRESSURE: 71 MMHG | HEART RATE: 87 BPM | WEIGHT: 165.57 LBS | HEIGHT: 68 IN | TEMPERATURE: 98.2 F

## 2019-02-21 DIAGNOSIS — S60.00XA CONTUSION OF FINGER OF LEFT HAND, UNSPECIFIED FINGER, INITIAL ENCOUNTER: ICD-10-CM

## 2019-02-21 DIAGNOSIS — F33.0 MILD EPISODE OF RECURRENT MAJOR DEPRESSIVE DISORDER (HCC): ICD-10-CM

## 2019-02-21 LAB
AMPHET UR QL SCN: POSITIVE
BARBITURATES UR QL SCN: NEGATIVE
BENZODIAZ UR QL SCN: NEGATIVE
BZE UR QL SCN: NEGATIVE
CANNABINOIDS UR QL SCN: POSITIVE
EKG IMPRESSION: NORMAL
METHADONE UR QL SCN: NEGATIVE
OPIATES UR QL SCN: NEGATIVE
OXYCODONE UR QL SCN: NEGATIVE
PCP UR QL SCN: NEGATIVE
POC BREATHALIZER: 0.01 PERCENT (ref 0–0.01)
PROPOXYPH UR QL SCN: NEGATIVE

## 2019-02-21 PROCEDURE — 93005 ELECTROCARDIOGRAM TRACING: CPT

## 2019-02-21 PROCEDURE — 99285 EMERGENCY DEPT VISIT HI MDM: CPT

## 2019-02-21 PROCEDURE — 80307 DRUG TEST PRSMV CHEM ANLYZR: CPT

## 2019-02-21 PROCEDURE — 93005 ELECTROCARDIOGRAM TRACING: CPT | Performed by: EMERGENCY MEDICINE

## 2019-02-21 PROCEDURE — 302970 POC BREATHALIZER: Performed by: EMERGENCY MEDICINE

## 2019-02-21 PROCEDURE — 73130 X-RAY EXAM OF HAND: CPT | Mod: LT

## 2019-02-21 PROCEDURE — 90791 PSYCH DIAGNOSTIC EVALUATION: CPT

## 2019-02-21 PROCEDURE — A9270 NON-COVERED ITEM OR SERVICE: HCPCS | Performed by: EMERGENCY MEDICINE

## 2019-02-21 PROCEDURE — 700102 HCHG RX REV CODE 250 W/ 637 OVERRIDE(OP): Performed by: EMERGENCY MEDICINE

## 2019-02-21 RX ORDER — LORAZEPAM 1 MG/1
1 TABLET ORAL ONCE
Status: COMPLETED | OUTPATIENT
Start: 2019-02-21 | End: 2019-02-21

## 2019-02-21 RX ADMIN — LORAZEPAM 1 MG: 1 TABLET ORAL at 15:00
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
## 2019-02-21 NOTE — DISCHARGE PLANNING
[facility]  Crisis/Safety Plan    Name:  Nancy Olvera  MRN:  [id number]  Date:  2019    Warning signs that a crisis may be developing for me or I may be at risk:  1) cravings to use  2) get sick to my stomach  3)    Coping strategies I can use on my own (relaxation, physical activity, etc):  1) walking  2) listen to music  3)     Ways I can make my environment safe:  1) no weapons, guns, or knives  2) no alcohol or drugs  3)    Things I want to tell myself when I feel a crisis developin) I can get though this  2) this will pass  3)    People I can contact for support or distraction (and their phone numbers):  1) Demi cronin  2) Gómez Cronin   Phone numbers aer in pt's cell phone  3)    If I’m not able to reach my support people, or the above strategies don’t help, I can contact the following professionals, agencies, or hotlines:  1) Crisis Call Center ():  [phone] -OR- [phone]  2) Crisis Text Line ():  Text CARE TO 589186  3) [place] [address]  4) [facility] [phone]    Chio Emerson R.N.

## 2019-02-21 NOTE — ED TRIAGE NOTES
Patient arrived for SI. Patient stated that she has been depressed for a long time and has been having SI with plan to crush up her propranolol pills and overdose on them. Patient states she has had SI and attempts in the past. Denies HI. Did not make any attempts today. Had a shot of whiskey 4 hours pta.
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
## 2019-03-18 ENCOUNTER — HOSPITAL ENCOUNTER (EMERGENCY)
Facility: MEDICAL CENTER | Age: 44
End: 2019-03-18
Attending: EMERGENCY MEDICINE
Payer: MEDICARE

## 2019-03-18 VITALS
SYSTOLIC BLOOD PRESSURE: 116 MMHG | RESPIRATION RATE: 16 BRPM | BODY MASS INDEX: 24.62 KG/M2 | WEIGHT: 162.48 LBS | OXYGEN SATURATION: 94 % | DIASTOLIC BLOOD PRESSURE: 79 MMHG | TEMPERATURE: 97.9 F | HEART RATE: 82 BPM | HEIGHT: 68 IN

## 2019-03-18 DIAGNOSIS — Z76.0 MEDICATION REFILL: ICD-10-CM

## 2019-03-18 PROCEDURE — 99284 EMERGENCY DEPT VISIT MOD MDM: CPT

## 2019-03-18 RX ORDER — GABAPENTIN 300 MG/1
900 CAPSULE ORAL 3 TIMES DAILY
Qty: 63 CAP | Refills: 0 | Status: SHIPPED | OUTPATIENT
Start: 2019-03-18 | End: 2019-03-30

## 2019-03-18 RX ORDER — DIVALPROEX SODIUM 250 MG/1
1000 TABLET, EXTENDED RELEASE ORAL DAILY
Qty: 7 TAB | Refills: 0 | Status: SHIPPED | OUTPATIENT
Start: 2019-03-18 | End: 2019-03-30

## 2019-03-18 RX ORDER — LEVOTHYROXINE SODIUM 0.07 MG/1
75 TABLET ORAL
Qty: 7 TAB | Refills: 0 | Status: SHIPPED | OUTPATIENT
Start: 2019-03-18 | End: 2019-03-30

## 2019-03-18 RX ORDER — PROPRANOLOL HYDROCHLORIDE 10 MG/1
10 TABLET ORAL 2 TIMES DAILY
Qty: 14 TAB | Refills: 0 | Status: SHIPPED | OUTPATIENT
Start: 2019-03-18 | End: 2019-03-30

## 2019-03-18 NOTE — ED PROVIDER NOTES
"ED Provider Note    CHIEF COMPLAINT  Chief Complaint   Patient presents with   • Medication Refill     Pt recently established with new PCP that is not filling her prescriptions correctly and would like refill on multiple medications and resources to find new doctor. Denies any medical complaints.        HPI  Nancy Olvera is a 44 y.o. female who presents with medication refill request, she states she takes gabapentin, Depakote, propranolol and Synthroid has run out as she is having difficulty finding a new doctor.  Patient reports her regular dosing is as follows, gabapentin 900 3 times a day, Depakote 1000 mg daily, propranolol 10 mg twice daily and Synthroid 750 MCG daily.  She offers no other complaints at this time.    REVIEW OF SYSTEMS  Negative for fever, rash, chest pain, vomiting.    PAST MEDICAL HISTORY   has a past medical history of ADHD (attention deficit hyperactivity disorder); Bipolar affective disorder (HCC); Cancer (HCC); Congestive heart failure (HCC); Hypertension; Psychiatric disorder; Seizure disorder (HCC); and Thyroid condition.    SOCIAL HISTORY  Social History     Social History Main Topics   • Smoking status: Current Some Day Smoker     Packs/day: 0.25     Types: Cigarettes   • Smokeless tobacco: Never Used   • Alcohol use No      Comment: Hx   • Drug use: Yes      Comment: medical marijuana   • Sexual activity: Not on file       SURGICAL HISTORY   has a past surgical history that includes tonsillectomy.    CURRENT MEDICATIONS  I personally reviewed the medication list in the charting documentation.     ALLERGIES  Allergies   Allergen Reactions   • Aspirin Anaphylaxis   • Compazine Swelling   • Sulfa Drugs Rash   • Tetracyclines Swelling   • Ultram [Tramadol Hcl] Swelling   • Food      \"Green Peppers\"       PHYSICAL EXAM  VITAL SIGNS: /79   Pulse (!) 112   Temp 36.6 °C (97.9 °F) (Temporal)   Resp 18   Ht 1.727 m (5' 8\")   Wt 73.7 kg (162 lb 7.7 oz)   SpO2 95%   Breastfeeding? " No   BMI 24.70 kg/m²   Constitutional: Alert in no apparent distress.  HENT: No signs of trauma.   Eyes: Conjunctiva normal, Non-icteric.   Chest: Normal nonlabored respirations  Skin: No erythema, No rash.   Musculoskeletal: Good range of motion in all major joints.   Neurologic: Alert, No focal deficits noted.   Psychiatric: Affect normal, Judgment normal.    COURSE & MEDICAL DECISION MAKING  Pertinent Labs & Imaging studies reviewed. (See chart for details)    Encounter Summary: This is a 44 y.o. female with requesting medication refill of gabapentin, Depakote, propranolol and Synthroid.  I will prescribe one weeks worth of these medications and refer her to additional primary care providers.  I did review her prior records, the goal was to verify the doses she provided to me and in fact I have verified these, but in the process I found that the patient has been here many times for psychiatric reasons, she at this time denies suicidal ideation, she states that she feels happier today than usual because her daughter, whom she has not seen in 10 years, is coming to visit her.  Stable and appropriate for discharge      DISPOSITION: Discharge Home      FINAL IMPRESSION  1. Medication refill        This dictation was created using voice recognition software. The accuracy of the dictation is limited to the abilities of the software. I expect there may be some errors of grammar and possibly content. The nursing notes were reviewed and certain aspects of this information were incorporated into this note.    Electronically signed by: Dov Olivier, 3/18/2019 10:45 AM

## 2019-03-18 NOTE — ED NOTES
Pt given d/c instructions, f/u info and location for pick-up and administration instructions for RX x 4 with verbal understanding.  HR improved at discharge.  Pt ambulatory from the ED w/ steady gait.  All belongings in possession on discharge.  Pt escorted to the lobby by RN.

## 2019-03-18 NOTE — ED TRIAGE NOTES
"Chief Complaint   Patient presents with   • Medication Refill     Pt recently established with new PCP that is not filling her prescriptions correctly and would like refill on multiple medications and resources to find new doctor. Denies any medical complaints.      /79   Pulse (!) 112   Temp 36.6 °C (97.9 °F) (Temporal)   Resp 18   Ht 1.727 m (5' 8\")   Wt 73.7 kg (162 lb 7.7 oz)   SpO2 95%   Breastfeeding? No   BMI 24.70 kg/m²     Pt ambulated into triage, complaints as above. VS as above, NAD, encouraged to return to the triage nurse or tech with any new complaints or symptoms.  "

## 2019-03-30 ENCOUNTER — APPOINTMENT (OUTPATIENT)
Dept: RADIOLOGY | Facility: MEDICAL CENTER | Age: 44
End: 2019-03-30
Attending: EMERGENCY MEDICINE
Payer: MEDICARE

## 2019-03-30 ENCOUNTER — HOSPITAL ENCOUNTER (EMERGENCY)
Facility: MEDICAL CENTER | Age: 44
End: 2019-03-30
Attending: EMERGENCY MEDICINE
Payer: MEDICARE

## 2019-03-30 VITALS
WEIGHT: 159.17 LBS | RESPIRATION RATE: 20 BRPM | HEIGHT: 68 IN | DIASTOLIC BLOOD PRESSURE: 68 MMHG | HEART RATE: 95 BPM | OXYGEN SATURATION: 99 % | BODY MASS INDEX: 24.12 KG/M2 | TEMPERATURE: 97.9 F | SYSTOLIC BLOOD PRESSURE: 116 MMHG

## 2019-03-30 DIAGNOSIS — Z76.0 MEDICATION REFILL: ICD-10-CM

## 2019-03-30 DIAGNOSIS — J06.9 UPPER RESPIRATORY TRACT INFECTION, UNSPECIFIED TYPE: ICD-10-CM

## 2019-03-30 LAB
APPEARANCE UR: CLEAR
BILIRUB UR QL STRIP.AUTO: NEGATIVE
COLOR UR: YELLOW
GLUCOSE UR STRIP.AUTO-MCNC: NEGATIVE MG/DL
KETONES UR STRIP.AUTO-MCNC: NEGATIVE MG/DL
LEUKOCYTE ESTERASE UR QL STRIP.AUTO: NEGATIVE
MICRO URNS: NORMAL
NITRITE UR QL STRIP.AUTO: NEGATIVE
PH UR STRIP.AUTO: 7.5 [PH]
PROT UR QL STRIP: NEGATIVE MG/DL
RBC UR QL AUTO: NEGATIVE
SP GR UR STRIP.AUTO: 1
UROBILINOGEN UR STRIP.AUTO-MCNC: 0.2 MG/DL

## 2019-03-30 PROCEDURE — 99284 EMERGENCY DEPT VISIT MOD MDM: CPT

## 2019-03-30 PROCEDURE — 700111 HCHG RX REV CODE 636 W/ 250 OVERRIDE (IP): Performed by: EMERGENCY MEDICINE

## 2019-03-30 PROCEDURE — 81003 URINALYSIS AUTO W/O SCOPE: CPT

## 2019-03-30 PROCEDURE — 700102 HCHG RX REV CODE 250 W/ 637 OVERRIDE(OP): Performed by: EMERGENCY MEDICINE

## 2019-03-30 PROCEDURE — 71046 X-RAY EXAM CHEST 2 VIEWS: CPT

## 2019-03-30 PROCEDURE — A9270 NON-COVERED ITEM OR SERVICE: HCPCS | Performed by: EMERGENCY MEDICINE

## 2019-03-30 RX ORDER — DIVALPROEX SODIUM 250 MG/1
1000 TABLET, EXTENDED RELEASE ORAL DAILY
Qty: 56 TAB | Refills: 0 | Status: SHIPPED | OUTPATIENT
Start: 2019-03-30 | End: 2019-04-13

## 2019-03-30 RX ORDER — ALBUTEROL SULFATE 90 UG/1
2 AEROSOL, METERED RESPIRATORY (INHALATION) EVERY 4 HOURS PRN
Qty: 8.5 G | Refills: 0 | Status: SHIPPED | OUTPATIENT
Start: 2019-03-30 | End: 2019-04-18

## 2019-03-30 RX ORDER — OXYCODONE HYDROCHLORIDE AND ACETAMINOPHEN 5; 325 MG/1; MG/1
1 TABLET ORAL ONCE
Status: COMPLETED | OUTPATIENT
Start: 2019-03-30 | End: 2019-03-30

## 2019-03-30 RX ORDER — ONDANSETRON 4 MG/1
4 TABLET, ORALLY DISINTEGRATING ORAL EVERY 6 HOURS PRN
Qty: 8 TAB | Refills: 0 | Status: SHIPPED | OUTPATIENT
Start: 2019-03-30 | End: 2019-07-30 | Stop reason: CLARIF

## 2019-03-30 RX ORDER — PROPRANOLOL HYDROCHLORIDE 10 MG/1
10 TABLET ORAL 2 TIMES DAILY
Qty: 28 TAB | Refills: 0 | Status: SHIPPED | OUTPATIENT
Start: 2019-03-30 | End: 2019-04-13

## 2019-03-30 RX ORDER — LEVOTHYROXINE SODIUM 0.07 MG/1
75 TABLET ORAL
Qty: 14 TAB | Refills: 0 | Status: SHIPPED | OUTPATIENT
Start: 2019-03-30 | End: 2019-07-26

## 2019-03-30 RX ORDER — ONDANSETRON 4 MG/1
4 TABLET, ORALLY DISINTEGRATING ORAL ONCE
Status: COMPLETED | OUTPATIENT
Start: 2019-03-30 | End: 2019-03-30

## 2019-03-30 RX ORDER — GABAPENTIN 300 MG/1
900 CAPSULE ORAL 3 TIMES DAILY
Qty: 126 CAP | Refills: 0 | Status: SHIPPED | OUTPATIENT
Start: 2019-03-30 | End: 2019-04-13

## 2019-03-30 RX ADMIN — OXYCODONE AND ACETAMINOPHEN 1 TABLET: 5; 325 TABLET ORAL at 14:40

## 2019-03-30 RX ADMIN — ONDANSETRON 4 MG: 4 TABLET, ORALLY DISINTEGRATING ORAL at 14:40

## 2019-03-30 ASSESSMENT — ENCOUNTER SYMPTOMS
NAUSEA: 1
COUGH: 1
DIARRHEA: 1
HEADACHES: 1
VOMITING: 1
SHORTNESS OF BREATH: 0

## 2019-03-30 NOTE — ED NOTES
Discharge instructions given to pt including follow up with pcp or returning if no improvement of symptoms or to return if worse. Prescription x 6 provided to pt. Questions answered by RN. Denies any new complaints. Discharged w/stable vitals and able to ambulate w/steady gait.

## 2019-03-30 NOTE — DISCHARGE INSTRUCTIONS
Medicines as prescribed.  Return to the emergency department any new medical problems or complaints.  Follow-up with your doctor

## 2019-03-30 NOTE — ED TRIAGE NOTES
"Chief Complaint   Patient presents with   • Flu Like Symptoms   • Medication Refill   • Nausea/Vomiting/Diarrhea     Pt reports that she is out of all her medications and since has been having N/V/D.  Pt also reports that she has flu like symptoms.   Blood pressure 116/68, pulse (!) 102, temperature 36.6 °C (97.9 °F), temperature source Temporal, resp. rate 18, height 1.727 m (5' 8\"), weight 72.2 kg (159 lb 2.8 oz), SpO2 99 %, not currently breastfeeding.    Pt informed of wait times. Educated on triage process.  Asked to return to triage RN for any new or worsening of symptoms. Thanked for patience.        "

## 2019-03-30 NOTE — ED NOTES
1430  Medicated per mar order. Allergies verified. Able to tolerate po medication. No episodes of n/v.  To and from xray.

## 2019-03-30 NOTE — ED PROVIDER NOTES
"ED Provider Note    Scribed for Dallas Kuo M.D. by Teetee Han. 3/30/2019, 2:16 PM.    Primary care provider: CECI Fatima  Means of arrival: walk in  History obtained from: patient  History limited by: none    CHIEF COMPLAINT  Chief Complaint   Patient presents with   • Flu Like Symptoms   • Medication Refill   • Nausea/Vomiting/Diarrhea       HPI  Nancy Olvera is a 44 y.o. female who presents to the Emergency Department for evaluation of a producitve cough onset three days ago. Patient explains that she has had green and yellow colored sputum production. She reports mild ear pain as well as associated nausea, vomiting, diarrhea, and \"hot flashes.\" She has had approximately 20 episodes of diarrhea today. She denies any shortness of breath. Per patient, she had asthma in the past but has not had to use her inhaler or nebulizer for many years. She is additionally complaining of a headache localized to her forehead and sinuses. Patient denies any runny nose, sore throat, dysuria. She has been taking Nyquil with no alleviation of her symptoms. She denies any chance of being pregnant.     Additionally, patient has been out of multiple medications for the past three days. She states that she is supposed to take 1,000 mg Depkaote per night but her primary care provider did not prescribe her enough pills and she has run out. She additionally has been out of Neurontin and propanolol for the past three days. Per patient, she often gets nauseous with vomiting and diarrhea when she is out of her medications.      REVIEW OF SYSTEMS  Review of Systems   HENT: Positive for ear pain.    Respiratory: Positive for cough. Negative for shortness of breath.    Gastrointestinal: Positive for diarrhea, nausea and vomiting.   Genitourinary: Negative for dysuria.   Neurological: Positive for headaches.       PAST MEDICAL HISTORY   has a past medical history of ADHD (attention deficit hyperactivity disorder); Bipolar " "affective disorder (HCC); Cancer (HCC); Congestive heart failure (HCC); Hypertension; Psychiatric disorder; Seizure disorder (HCC); and Thyroid condition.    SURGICAL HISTORY   has a past surgical history that includes tonsillectomy.    SOCIAL HISTORY  Social History   Substance Use Topics   • Smoking status: Current Some Day Smoker     Packs/day: 0.25     Types: Cigarettes   • Smokeless tobacco: Never Used   • Alcohol use No      Comment: Hx      History   Drug Use     Comment: medical marijuana       FAMILY HISTORY  No pertinent family history mentioned.     CURRENT MEDICATIONS  Home Medications     Reviewed by Sharmaine Zeng R.N. (Registered Nurse) on 03/30/19 at 1343  Med List Status: Partial   Medication Last Dose Status   albuterol 108 (90 Base) MCG/ACT Aero Soln inhalation aerosol  Active   divalproex ER (DEPAKOTE ER) 250 MG TABLET SR 24 HR  Active   gabapentin (NEURONTIN) 300 MG Cap  Active   ibuprofen (MOTRIN) 200 MG Tab  Active   levothyroxine (SYNTHROID) 75 MCG Tab  Active   lidocaine (LIDODERM) 5 % Patch  Active   mag hydrox-al hydrox-simeth (MAALOX PLUS ES OR MYLANTA DS) 400-400-40 MG/5ML Suspension  Active   nicotine polacrilex (NICORETTE) 2 MG Gum  Active   propranolol (INDERAL) 10 MG Tab  Active                ALLERGIES  Allergies   Allergen Reactions   • Aspirin Anaphylaxis   • Compazine Swelling   • Sulfa Drugs Rash   • Tetracyclines Swelling   • Ultram [Tramadol Hcl] Swelling   • Food      \"Green Peppers\"       PHYSICAL EXAM  VITAL SIGNS: /68   Pulse (!) 102   Temp 36.6 °C (97.9 °F) (Temporal)   Resp 18   Ht 1.727 m (5' 8\")   Wt 72.2 kg (159 lb 2.8 oz)   SpO2 99%   BMI 24.20 kg/m²   Vitals reviewed.  Constitutional: Well developed, Well nourished, No acute distress, Non-toxic appearance.   HENT: Normocephalic, Atraumatic, Bilateral external ears normal, Oropharynx moist, No oral exudates, Nose normal. Mild pharyngeal erythema  Eyes: PERRL, EOMI, Conjunctiva normal, No discharge. "   Neck: Normal range of motion, No tenderness, Supple, No stridor.   Cardiovascular: Normal heart rate, Normal rhythm, No murmurs, No rubs, No gallops.   Thorax & Lungs: Slightly diminished breath sounds on right base, No respiratory distress, No wheezing, No chest tenderness.   Abdomen: Bowel sounds normal, Soft, No tenderness  Skin: Warm, Dry, No erythema, No rash.   Back: No tenderness, No CVA tenderness. .   Musculoskeletal: Good range of motion in all major joints. No edema. No tenderness to palpation or major deformities noted.   Neurologic: Alert, Normal motor function, Normal sensory function, No focal deficits noted.   Psychiatric: Affect normal    LABS  Results for orders placed or performed during the hospital encounter of 03/30/19   URINALYSIS CULTURE, IF INDICATED   Result Value Ref Range    Color Yellow     Character Clear     Specific Gravity 1.005 <1.035    Ph 7.5 5.0 - 8.0    Glucose Negative Negative mg/dL    Ketones Negative Negative mg/dL    Protein Negative Negative mg/dL    Bilirubin Negative Negative    Urobilinogen, Urine 0.2 Negative    Nitrite Negative Negative    Leukocyte Esterase Negative Negative    Occult Blood Negative Negative    Micro Urine Req see below      All labs reviewed by me.    RADIOLOGY  DX-CHEST-2 VIEWS   Final Result      Negative two views of the chest.        The radiologist's interpretation of all radiological studies have been reviewed by me.    COURSE & MEDICAL DECISION MAKING  Pertinent Labs & Imaging studies reviewed. (See chart for details)    2:16 PM Patient seen and examined at bedside. The patient presents with a cough, nausea, vomiting, diarrhea onset three days ago, and the differential diagnosis includes but is not limited to upper respiratory infection, pneumonia, bronchospasm per influenza, medication noncompliance, UTI.  Ordered for urinalysis, Dx-chest to evaluate. Patient will be treated with 1 tab of 5-325 percocet, 4 mg zofran for her symptoms.      The patient had some question of decreased breath sounds with an x-ray this is negative.  She does not have a fever or hypoxia do not think antibiotics are indicated.  She has some urinary symptoms a UTI was obtained this is also negative.    Patient does not meet criteria for Tamiflu so I did not do a flu swab.    She has a headache and nausea and vomiting I think this is mostly a viral illness.  Given Percocet and Zofran and felt much better.  Her headache is not suggestive of meningitis subarachnoid hemorrhage or brain mass no indication for labs or head CT.    The patient be discharged home with URI return precautions.  Recommend over-the-counter pain medicines rest plenty of fluids and follow-up.    Lastly, the patient is requesting a refill of certain medications because she is been unable to see her doctor.  These include antihypertensive seizure medicines and some nonnarcotic medications.  I have written her 2-week supply.  She is counseled to see her doctor.    .    The patient is referred to a primary physician for blood pressure management, diabetic screening, and for all other preventative health concerns.        DISPOSITION:  Patient will be discharged home in stable condition.    FOLLOW UP:  Tonya Colbert A.P.R.NGeena  [address] [place] [id number]  [phone]            OUTPATIENT MEDICATIONS:  Discharge Medication List as of 3/30/2019  3:41 PM          FINAL IMPRESSION  1. Upper respiratory tract infection, unspecified type    2. Medication refill        Teetee QURESHI (Deborahibsharmila), am scribing for, and in the presence of, Dallas Kuo M.D..    Electronically signed by: Teetee Han (Kayla), 3/30/2019    Dallas QURESHI M.D. personally performed the services described in this documentation, as scribed by Teetee Han in my presence, and it is both accurate and complete. E    The note accurately reflects work and decisions made by me.  Dallas Kuo  3/30/2019  8:52  PM

## 2019-04-18 ENCOUNTER — HOSPITAL ENCOUNTER (EMERGENCY)
Facility: MEDICAL CENTER | Age: 44
End: 2019-04-18
Attending: EMERGENCY MEDICINE
Payer: MEDICARE

## 2019-04-18 VITALS
HEART RATE: 79 BPM | SYSTOLIC BLOOD PRESSURE: 112 MMHG | OXYGEN SATURATION: 97 % | TEMPERATURE: 97.3 F | BODY MASS INDEX: 24.81 KG/M2 | WEIGHT: 163.14 LBS | RESPIRATION RATE: 18 BRPM | DIASTOLIC BLOOD PRESSURE: 70 MMHG

## 2019-04-18 DIAGNOSIS — T22.011A BURN OF RIGHT FOREARM, UNSPECIFIED BURN DEGREE, INITIAL ENCOUNTER: ICD-10-CM

## 2019-04-18 DIAGNOSIS — Z72.0 TOBACCO USE: ICD-10-CM

## 2019-04-18 DIAGNOSIS — J20.9 ACUTE BRONCHITIS, UNSPECIFIED ORGANISM: ICD-10-CM

## 2019-04-18 PROCEDURE — 99283 EMERGENCY DEPT VISIT LOW MDM: CPT

## 2019-04-18 RX ORDER — PROPRANOLOL HYDROCHLORIDE 20 MG/1
20 TABLET ORAL 2 TIMES DAILY
Qty: 60 TAB | Refills: 1 | Status: ON HOLD | OUTPATIENT
Start: 2019-04-18 | End: 2019-08-01

## 2019-04-18 RX ORDER — PROPRANOLOL HYDROCHLORIDE 20 MG/1
20 TABLET ORAL 2 TIMES DAILY
COMMUNITY
End: 2019-04-18

## 2019-04-18 RX ORDER — GABAPENTIN 300 MG/1
900 CAPSULE ORAL 3 TIMES DAILY
Status: ON HOLD | COMMUNITY
End: 2019-08-01

## 2019-04-18 RX ORDER — AZITHROMYCIN 250 MG/1
TABLET, FILM COATED ORAL
Qty: 6 TAB | Refills: 0 | Status: SHIPPED | OUTPATIENT
Start: 2019-04-18 | End: 2019-05-21

## 2019-04-18 RX ORDER — DIVALPROEX SODIUM 250 MG/1
750 TABLET, DELAYED RELEASE ORAL
Qty: 90 TAB | Refills: 1 | Status: SHIPPED | OUTPATIENT
Start: 2019-04-18 | End: 2019-07-30 | Stop reason: CLARIF

## 2019-04-18 RX ORDER — ALBUTEROL SULFATE 90 UG/1
2 AEROSOL, METERED RESPIRATORY (INHALATION) EVERY 4 HOURS PRN
Qty: 8.5 G | Refills: 0 | Status: SHIPPED | OUTPATIENT
Start: 2019-04-18 | End: 2019-07-30 | Stop reason: CLARIF

## 2019-04-18 RX ORDER — DIVALPROEX SODIUM 250 MG/1
750 TABLET, DELAYED RELEASE ORAL
COMMUNITY
End: 2019-04-18

## 2019-04-18 RX ORDER — DIVALPROEX SODIUM 250 MG/1
250 TABLET, DELAYED RELEASE ORAL DAILY
COMMUNITY
End: 2019-04-18

## 2019-04-18 NOTE — ED NOTES
Pt resting in bed watching TV at this time.  No needs verbalized.  Will continue to monitor.  No family present.

## 2019-04-18 NOTE — ED NOTES
Pt has been provided written and verbal education for bronchitis and how to obtain her medications. She asked for her home medications to be re-prescribed, this RN updated med rec per patient. MD aware med rec is updated.   Pt ambulated to lobby with upright and steady gait.

## 2019-04-18 NOTE — DISCHARGE INSTRUCTIONS
Stop smoking  Return to ER for difficulty breathing, chest pain, vomiting, or other concerns  Follow-up with your primary care provider  Take medications as directed  Use albuterol inhaler as needed and take Z-Rosalio for bronchitis
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
## 2019-05-21 ENCOUNTER — HOSPITAL ENCOUNTER (EMERGENCY)
Facility: MEDICAL CENTER | Age: 44
End: 2019-05-21
Payer: MEDICARE

## 2019-05-21 VITALS
RESPIRATION RATE: 18 BRPM | OXYGEN SATURATION: 98 % | TEMPERATURE: 95.4 F | HEART RATE: 78 BPM | HEIGHT: 68 IN | BODY MASS INDEX: 22.99 KG/M2 | SYSTOLIC BLOOD PRESSURE: 125 MMHG | WEIGHT: 151.68 LBS | DIASTOLIC BLOOD PRESSURE: 77 MMHG

## 2019-05-21 PROCEDURE — 302449 STATCHG TRIAGE ONLY (STATISTIC)

## 2019-05-21 NOTE — ED TRIAGE NOTES
Pt requesting help with detox. Pt does admit to drinking 1 pint of vodka daily. Pt stating last drink last night.

## 2019-05-24 NOTE — ADDENDUM NOTE
Encounter addended by: Itzel Keith on: 5/24/2019  8:50 AM<BR>    Actions taken: Charge Capture section accepted

## 2019-07-26 ENCOUNTER — HOSPITAL ENCOUNTER (EMERGENCY)
Facility: MEDICAL CENTER | Age: 44
End: 2019-07-26
Attending: EMERGENCY MEDICINE
Payer: MEDICARE

## 2019-07-26 VITALS
TEMPERATURE: 98.3 F | HEART RATE: 111 BPM | SYSTOLIC BLOOD PRESSURE: 143 MMHG | RESPIRATION RATE: 18 BRPM | OXYGEN SATURATION: 97 % | WEIGHT: 147.49 LBS | DIASTOLIC BLOOD PRESSURE: 95 MMHG | HEIGHT: 68 IN | BODY MASS INDEX: 22.35 KG/M2

## 2019-07-26 DIAGNOSIS — E03.9 HYPOTHYROIDISM, UNSPECIFIED TYPE: ICD-10-CM

## 2019-07-26 DIAGNOSIS — Z76.0 MEDICATION REFILL: ICD-10-CM

## 2019-07-26 PROCEDURE — 99283 EMERGENCY DEPT VISIT LOW MDM: CPT

## 2019-07-26 RX ORDER — LEVOTHYROXINE SODIUM 0.07 MG/1
75 TABLET ORAL
Qty: 30 TAB | Refills: 0 | Status: SHIPPED | OUTPATIENT
Start: 2019-07-26 | End: 2019-10-23

## 2019-07-26 NOTE — ED TRIAGE NOTES
Nancy Olvera  44 y.o.  Chief Complaint   Patient presents with   • Medication Refill     levothyroxine 75 mcg, has been out of it for 3 months     Patient ambulatory with steady gait to triage room with above complaint.  Patient appears in no acute distress.  Patient states she has been trying to get doctor in [place] but appointments have been taking too long.  States doesn't have any meds but this one is most important because she doesn't have much energy.     Patient escorted to the lobby and instructed to notify staff of any changes in condition.
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
## 2019-07-26 NOTE — ED PROVIDER NOTES
ED Provider Note    Scribed for Montana Zaragoza M.D. by Javier Mason. 7/26/2019, 7:39 AM.    Primary care provider: CECI Fatima  Means of arrival: Walk In  History obtained from: Patient  History limited by: None    CHIEF COMPLAINT  Chief Complaint   Patient presents with   • Medication Refill     levothyroxine 75 mcg, has been out of it for 3 months       HPI  Nancy Olvera is a 44 y.o. female who presents to the Emergency Department in need of a medication refill for her levothyrxoine. She states that she is new in town, has setup an appointment to establish care with a primary care provider, however is unable to be seen until three weeks from now. She has currently run out of her thyroid medication and is feeling weak without it, and thus has come here requesting a refill. She denies any weight changes.    REVIEW OF SYSTEMS  Pertinent positives include medication refill, weakness.   Pertinent negatives include no weight changes.       PAST MEDICAL HISTORY   has a past medical history of ADHD (attention deficit hyperactivity disorder); Bipolar affective disorder (HCC); Cancer (HCC); Congestive heart failure (HCC); Hypertension; Psychiatric disorder; Seizure disorder (HCC); and Thyroid condition.    SURGICAL HISTORY   has a past surgical history that includes tonsillectomy and thyroidectomy.    SOCIAL HISTORY  Social History   Substance Use Topics   • Smoking status: Current Every Day Smoker     Packs/day: 0.50     Types: Cigarettes   • Smokeless tobacco: Never Used   • Alcohol use No      Comment: none for 6 months, hx daily use      History   Drug Use   • Types: Inhaled     Comment: THC       FAMILY HISTORY  History reviewed. No pertinent family history.    CURRENT MEDICATIONS  No current facility-administered medications on file prior to encounter.      Current Outpatient Prescriptions on File Prior to Encounter   Medication Sig Dispense Refill   • albuterol 108 (90 Base) MCG/ACT Aero Soln  "inhalation aerosol Inhale 2 Puffs by mouth every four hours as needed for Shortness of Breath. 8.5 g 0   • gabapentin (NEURONTIN) 300 MG Cap Take 900 mg by mouth 3 times a day.     • divalproex (DEPAKOTE) 250 MG Tablet Delayed Response Take 3 Tabs by mouth every bedtime. 90 Tab 1   • propranolol (INDERAL) 20 MG Tab Take 1 Tab by mouth 2 times a day. 60 Tab 1   • ondansetron (ZOFRAN ODT) 4 MG TABLET DISPERSIBLE Take 1 Tab by mouth every 6 hours as needed for Nausea. 8 Tab 0   • lidocaine (LIDODERM) 5 % Patch Apply 1 Patch to skin as directed every 24 hours. 10 Patch 0   • mag hydrox-al hydrox-simeth (MAALOX PLUS ES OR MYLANTA DS) 400-400-40 MG/5ML Suspension Take 10 mL by mouth 4 times a day as needed. 560 mL 0   • ibuprofen (MOTRIN) 200 MG Tab Take 400 mg by mouth every four hours as needed (Pain).     • nicotine polacrilex (NICORETTE) 2 MG Gum Take 2 mg by mouth every 2 hours as needed for Smoking Cessation.         ALLERGIES  Allergies   Allergen Reactions   • Aspirin Anaphylaxis   • Compazine Swelling   • Sulfa Drugs Rash   • Tetracyclines Swelling   • Ultram [Tramadol Hcl] Swelling   • Food      \"Green Peppers\"       PHYSICAL EXAM  VITAL SIGNS: /95   Pulse (!) 111   Temp 36.8 °C (98.3 °F) (Temporal)   Resp 18   Ht 1.727 m (5' 8\")   Wt 66.9 kg (147 lb 7.8 oz)   LMP 07/26/2019   SpO2 97%   BMI 22.43 kg/m²     Nursing note and vitals reviewed.  Constitutional: No distress.   HENT: Head is atraumatic. Oropharynx is moist.   Eyes: Conjunctivae are normal. Pupils are equal, round, and reactive to light.   Cardiovascular: Normal peripheral perfusion  Respiratory: No respiratory distress.   Musculoskeletal: Normal range of motion.   Neurological: Alert. No focal deficits noted.    Skin: No rash.   Psych: Appropriate for clinical situation     COURSE & MEDICAL DECISION MAKING  Nursing notes, VS, PMSFHx reviewed in chart.    7:39 AM - Patient seen and examined at bedside. Informed the patient that I will " write her a prescription for a months worth of her levothyroxine so she may have it until she is seen by her PCP. She understands and agrees to discharge.     HTN/IDDM FOLLOW UP:  The patient has known hypertension and is being followed by their primary care doctor      DISPOSITION:  Patient will be discharged home in stable condition.    FOLLOW UP:  CECI Fatima  [address]  [facility] [id number] [phone]    Schedule an appointment as soon as possible for a visit       [facility], Emergency Dept  [address] [facility] [id number] [phone]    As needed      OUTPATIENT MEDICATIONS:  New Prescriptions    LEVOTHYROXINE (SYNTHROID) 75 MCG TAB    Take 1 Tab by mouth Every morning on an empty stomach.       FINAL IMPRESSION  1. Medication refill    2. Hypothyroidism, unspecified type          I, Javier Mason (Scribe), am scribing for, and in the presence of, Montana Zaragoza M.D..    Electronically signed by: Javier Mason (Deborahibe), 7/26/2019    IMontana M.D. personally performed the services described in this documentation, as scribed by Javier Mason in my presence, and it is both accurate and complete. E.    The note accurately reflects work and decisions made by me.  Montana Zaragoza  7/26/2019  10:58 AM

## 2019-07-30 ENCOUNTER — HOSPITAL ENCOUNTER (INPATIENT)
Facility: MEDICAL CENTER | Age: 44
LOS: 2 days | DRG: 918 | End: 2019-08-01
Attending: EMERGENCY MEDICINE | Admitting: INTERNAL MEDICINE
Payer: MEDICARE

## 2019-07-30 PROBLEM — E87.6 HYPOKALEMIA: Status: ACTIVE | Noted: 2019-07-30

## 2019-07-30 PROBLEM — F10.20 ALCOHOLISM (HCC): Status: ACTIVE | Noted: 2019-07-30

## 2019-07-30 PROBLEM — R73.9 HYPERGLYCEMIA: Status: ACTIVE | Noted: 2019-07-30

## 2019-07-30 PROBLEM — E03.9 HYPOTHYROIDISM: Status: ACTIVE | Noted: 2019-07-30

## 2019-07-30 PROBLEM — T50.902A INTENTIONAL DRUG OVERDOSE (HCC): Status: ACTIVE | Noted: 2019-07-30

## 2019-07-30 PROBLEM — D69.6 THROMBOCYTOPENIA (HCC): Status: ACTIVE | Noted: 2019-07-30

## 2019-07-30 PROBLEM — T50.902A SUICIDE AND SELF-INFLICTED POISONING BY DRUGS AND MEDICINAL SUBSTANCES (HCC): Status: ACTIVE | Noted: 2019-07-30

## 2019-07-30 PROBLEM — F31.9 MANIC DEPRESSIVE ILLNESS (HCC): Status: ACTIVE | Noted: 2019-07-30

## 2019-07-30 PROBLEM — I10 ESSENTIAL HYPERTENSION: Status: ACTIVE | Noted: 2019-07-30

## 2019-07-30 PROBLEM — D72.819 LEUKOPENIA: Status: ACTIVE | Noted: 2019-07-30

## 2019-07-30 PROBLEM — F32.A DEPRESSION: Status: ACTIVE | Noted: 2019-07-30

## 2019-07-30 PROBLEM — E87.5 HYPERKALEMIA: Status: ACTIVE | Noted: 2019-07-30

## 2019-07-30 PROBLEM — R79.89 ELEVATED LIVER FUNCTION TESTS: Status: ACTIVE | Noted: 2019-07-30

## 2019-07-30 LAB
ALBUMIN SERPL BCP-MCNC: 3.5 G/DL (ref 3.2–4.9)
ALBUMIN/GLOB SERPL: 1.4 G/DL
ALP SERPL-CCNC: 81 U/L (ref 30–99)
ALT SERPL-CCNC: 93 U/L (ref 2–50)
ANION GAP SERPL CALC-SCNC: 12 MMOL/L (ref 0–11.9)
APAP SERPL-MCNC: <10 UG/ML (ref 10–30)
AST SERPL-CCNC: 205 U/L (ref 12–45)
BASOPHILS # BLD AUTO: 1.3 % (ref 0–1.8)
BASOPHILS # BLD: 0.05 K/UL (ref 0–0.12)
BILIRUB SERPL-MCNC: 0.7 MG/DL (ref 0.1–1.5)
BUN SERPL-MCNC: 7 MG/DL (ref 8–22)
CALCIUM SERPL-MCNC: 8.1 MG/DL (ref 8.5–10.5)
CHLORIDE SERPL-SCNC: 108 MMOL/L (ref 96–112)
CO2 SERPL-SCNC: 20 MMOL/L (ref 20–33)
CREAT SERPL-MCNC: 0.64 MG/DL (ref 0.5–1.4)
EKG IMPRESSION: NORMAL
EOSINOPHIL # BLD AUTO: 0.67 K/UL (ref 0–0.51)
EOSINOPHIL NFR BLD: 17.3 % (ref 0–6.9)
ERYTHROCYTE [DISTWIDTH] IN BLOOD BY AUTOMATED COUNT: 54.8 FL (ref 35.9–50)
ETHANOL BLD-MCNC: 0.16 G/DL
GLOBULIN SER CALC-MCNC: 2.5 G/DL (ref 1.9–3.5)
GLUCOSE SERPL-MCNC: 133 MG/DL (ref 65–99)
HCG SERPL QL: NEGATIVE
HCT VFR BLD AUTO: 40.9 % (ref 37–47)
HGB BLD-MCNC: 13.3 G/DL (ref 12–16)
IMM GRANULOCYTES # BLD AUTO: 0.01 K/UL (ref 0–0.11)
IMM GRANULOCYTES NFR BLD AUTO: 0.3 % (ref 0–0.9)
LYMPHOCYTES # BLD AUTO: 1.2 K/UL (ref 1–4.8)
LYMPHOCYTES NFR BLD: 30.9 % (ref 22–41)
MAGNESIUM SERPL-MCNC: 1.7 MG/DL (ref 1.5–2.5)
MCH RBC QN AUTO: 28.8 PG (ref 27–33)
MCHC RBC AUTO-ENTMCNC: 32.5 G/DL (ref 33.6–35)
MCV RBC AUTO: 88.5 FL (ref 81.4–97.8)
MONOCYTES # BLD AUTO: 0.3 K/UL (ref 0–0.85)
MONOCYTES NFR BLD AUTO: 7.7 % (ref 0–13.4)
NEUTROPHILS # BLD AUTO: 1.65 K/UL (ref 2–7.15)
NEUTROPHILS NFR BLD: 42.5 % (ref 44–72)
NRBC # BLD AUTO: 0 K/UL
NRBC BLD-RTO: 0 /100 WBC
PHOSPHATE SERPL-MCNC: 2.3 MG/DL (ref 2.5–4.5)
PLATELET # BLD AUTO: 151 K/UL (ref 164–446)
PMV BLD AUTO: 9.7 FL (ref 9–12.9)
POTASSIUM SERPL-SCNC: 2.8 MMOL/L (ref 3.6–5.5)
PROT SERPL-MCNC: 6 G/DL (ref 6–8.2)
RBC # BLD AUTO: 4.62 M/UL (ref 4.2–5.4)
SALICYLATES SERPL-MCNC: 0 MG/DL (ref 15–25)
SODIUM SERPL-SCNC: 140 MMOL/L (ref 135–145)
WBC # BLD AUTO: 3.9 K/UL (ref 4.8–10.8)

## 2019-07-30 PROCEDURE — 700102 HCHG RX REV CODE 250 W/ 637 OVERRIDE(OP): Performed by: STUDENT IN AN ORGANIZED HEALTH CARE EDUCATION/TRAINING PROGRAM

## 2019-07-30 PROCEDURE — 700111 HCHG RX REV CODE 636 W/ 250 OVERRIDE (IP): Performed by: EMERGENCY MEDICINE

## 2019-07-30 PROCEDURE — 85025 COMPLETE CBC W/AUTO DIFF WBC: CPT

## 2019-07-30 PROCEDURE — 84100 ASSAY OF PHOSPHORUS: CPT

## 2019-07-30 PROCEDURE — 80048 BASIC METABOLIC PNL TOTAL CA: CPT

## 2019-07-30 PROCEDURE — HZ2ZZZZ DETOXIFICATION SERVICES FOR SUBSTANCE ABUSE TREATMENT: ICD-10-PCS | Performed by: INTERNAL MEDICINE

## 2019-07-30 PROCEDURE — 96365 THER/PROPH/DIAG IV INF INIT: CPT

## 2019-07-30 PROCEDURE — 83735 ASSAY OF MAGNESIUM: CPT

## 2019-07-30 PROCEDURE — 84703 CHORIONIC GONADOTROPIN ASSAY: CPT

## 2019-07-30 PROCEDURE — 99291 CRITICAL CARE FIRST HOUR: CPT | Performed by: INTERNAL MEDICINE

## 2019-07-30 PROCEDURE — 93005 ELECTROCARDIOGRAM TRACING: CPT | Performed by: EMERGENCY MEDICINE

## 2019-07-30 PROCEDURE — 700111 HCHG RX REV CODE 636 W/ 250 OVERRIDE (IP): Performed by: STUDENT IN AN ORGANIZED HEALTH CARE EDUCATION/TRAINING PROGRAM

## 2019-07-30 PROCEDURE — 80307 DRUG TEST PRSMV CHEM ANLYZR: CPT

## 2019-07-30 PROCEDURE — A9270 NON-COVERED ITEM OR SERVICE: HCPCS | Performed by: STUDENT IN AN ORGANIZED HEALTH CARE EDUCATION/TRAINING PROGRAM

## 2019-07-30 PROCEDURE — 99223 1ST HOSP IP/OBS HIGH 75: CPT | Mod: GC | Performed by: PSYCHIATRY & NEUROLOGY

## 2019-07-30 PROCEDURE — 80053 COMPREHEN METABOLIC PANEL: CPT

## 2019-07-30 PROCEDURE — A9270 NON-COVERED ITEM OR SERVICE: HCPCS | Performed by: EMERGENCY MEDICINE

## 2019-07-30 PROCEDURE — 700102 HCHG RX REV CODE 250 W/ 637 OVERRIDE(OP): Performed by: EMERGENCY MEDICINE

## 2019-07-30 PROCEDURE — 700105 HCHG RX REV CODE 258: Performed by: EMERGENCY MEDICINE

## 2019-07-30 PROCEDURE — 770022 HCHG ROOM/CARE - ICU (200)

## 2019-07-30 PROCEDURE — 99291 CRITICAL CARE FIRST HOUR: CPT

## 2019-07-30 RX ORDER — POLYETHYLENE GLYCOL 3350 17 G/17G
1 POWDER, FOR SOLUTION ORAL
Status: DISCONTINUED | OUTPATIENT
Start: 2019-07-30 | End: 2019-08-01 | Stop reason: HOSPADM

## 2019-07-30 RX ORDER — NICOTINE 21 MG/24HR
14 PATCH, TRANSDERMAL 24 HOURS TRANSDERMAL
Status: DISCONTINUED | OUTPATIENT
Start: 2019-07-30 | End: 2019-08-01 | Stop reason: HOSPADM

## 2019-07-30 RX ORDER — ONDANSETRON 4 MG/1
4 TABLET, ORALLY DISINTEGRATING ORAL EVERY 4 HOURS PRN
Status: DISCONTINUED | OUTPATIENT
Start: 2019-07-30 | End: 2019-08-01 | Stop reason: HOSPADM

## 2019-07-30 RX ORDER — AMOXICILLIN 250 MG
2 CAPSULE ORAL 2 TIMES DAILY
Status: DISCONTINUED | OUTPATIENT
Start: 2019-07-30 | End: 2019-08-01 | Stop reason: HOSPADM

## 2019-07-30 RX ORDER — LORAZEPAM 2 MG/ML
1 INJECTION INTRAMUSCULAR ONCE
Status: DISCONTINUED | OUTPATIENT
Start: 2019-07-30 | End: 2019-07-30

## 2019-07-30 RX ORDER — LORAZEPAM 2 MG/ML
1 INJECTION INTRAMUSCULAR
Status: DISCONTINUED | OUTPATIENT
Start: 2019-07-30 | End: 2019-07-30

## 2019-07-30 RX ORDER — ACETAMINOPHEN 500 MG
500 TABLET ORAL EVERY 6 HOURS PRN
Status: DISCONTINUED | OUTPATIENT
Start: 2019-07-30 | End: 2019-08-01 | Stop reason: HOSPADM

## 2019-07-30 RX ORDER — ALBUTEROL SULFATE 90 UG/1
2 AEROSOL, METERED RESPIRATORY (INHALATION)
Status: DISCONTINUED | OUTPATIENT
Start: 2019-07-30 | End: 2019-08-01 | Stop reason: HOSPADM

## 2019-07-30 RX ORDER — LORAZEPAM 2 MG/ML
2 INJECTION INTRAMUSCULAR
Status: DISCONTINUED | OUTPATIENT
Start: 2019-07-30 | End: 2019-08-01 | Stop reason: HOSPADM

## 2019-07-30 RX ORDER — MAGNESIUM SULFATE HEPTAHYDRATE 40 MG/ML
4 INJECTION, SOLUTION INTRAVENOUS ONCE
Status: COMPLETED | OUTPATIENT
Start: 2019-07-30 | End: 2019-07-30

## 2019-07-30 RX ORDER — POTASSIUM CHLORIDE 7.45 MG/ML
10 INJECTION INTRAVENOUS ONCE
Status: COMPLETED | OUTPATIENT
Start: 2019-07-30 | End: 2019-07-30

## 2019-07-30 RX ORDER — POTASSIUM CHLORIDE 20 MEQ/1
20 TABLET, EXTENDED RELEASE ORAL ONCE
Status: COMPLETED | OUTPATIENT
Start: 2019-07-30 | End: 2019-07-30

## 2019-07-30 RX ORDER — POTASSIUM CHLORIDE 20 MEQ/1
40 TABLET, EXTENDED RELEASE ORAL ONCE
Status: COMPLETED | OUTPATIENT
Start: 2019-07-30 | End: 2019-07-30

## 2019-07-30 RX ORDER — BISACODYL 10 MG
10 SUPPOSITORY, RECTAL RECTAL
Status: DISCONTINUED | OUTPATIENT
Start: 2019-07-30 | End: 2019-08-01 | Stop reason: HOSPADM

## 2019-07-30 RX ORDER — SODIUM CHLORIDE 9 MG/ML
1000 INJECTION, SOLUTION INTRAVENOUS ONCE
Status: COMPLETED | OUTPATIENT
Start: 2019-07-30 | End: 2019-07-30

## 2019-07-30 RX ADMIN — NICOTINE 14 MG: 14 PATCH, EXTENDED RELEASE TRANSDERMAL at 21:03

## 2019-07-30 RX ADMIN — Medication 1 LOZENGE: at 22:02

## 2019-07-30 RX ADMIN — POTASSIUM CHLORIDE 20 MEQ: 1500 TABLET, EXTENDED RELEASE ORAL at 11:05

## 2019-07-30 RX ADMIN — ONDANSETRON 4 MG: 4 TABLET, ORALLY DISINTEGRATING ORAL at 17:50

## 2019-07-30 RX ADMIN — ENOXAPARIN SODIUM 40 MG: 100 INJECTION SUBCUTANEOUS at 14:05

## 2019-07-30 RX ADMIN — MAGNESIUM SULFATE IN WATER 4 G: 40 INJECTION, SOLUTION INTRAVENOUS at 14:05

## 2019-07-30 RX ADMIN — SODIUM CHLORIDE 1000 ML: 9 INJECTION, SOLUTION INTRAVENOUS at 10:08

## 2019-07-30 RX ADMIN — POTASSIUM CHLORIDE 40 MEQ: 1500 TABLET, EXTENDED RELEASE ORAL at 12:10

## 2019-07-30 RX ADMIN — POTASSIUM CHLORIDE 10 MEQ: 7.46 INJECTION, SOLUTION INTRAVENOUS at 11:05

## 2019-07-30 ASSESSMENT — ENCOUNTER SYMPTOMS
FLANK PAIN: 0
HEADACHES: 0
HALLUCINATIONS: 0
ABDOMINAL PAIN: 0
FEVER: 0
NAUSEA: 0
FOCAL WEAKNESS: 0
SPEECH CHANGE: 0
NERVOUS/ANXIOUS: 1
BLOOD IN STOOL: 0
WEAKNESS: 0
WHEEZING: 0
SENSORY CHANGE: 0
SEIZURES: 0
SPUTUM PRODUCTION: 0
PALPITATIONS: 0
NAUSEA: 1
DIAPHORESIS: 0
BRUISES/BLEEDS EASILY: 0
DOUBLE VISION: 0
MEMORY LOSS: 0
SORE THROAT: 0
CHILLS: 0
SHORTNESS OF BREATH: 0
BACK PAIN: 0
INSOMNIA: 1
DIZZINESS: 0
COUGH: 0
VOMITING: 0
CONSTIPATION: 0
DIARRHEA: 1
DEPRESSION: 1
BLURRED VISION: 0

## 2019-07-30 ASSESSMENT — LIFESTYLE VARIABLES
EVER_SMOKED: YES
HAVE PEOPLE ANNOYED YOU BY CRITICIZING YOUR DRINKING: YES
DOES PATIENT WANT TO STOP DRINKING: CANNOT ASSESS
ORIENTATION AND CLOUDING OF SENSORIUM: ORIENTED AND CAN DO SERIAL ADDITIONS
HAVE YOU EVER FELT YOU SHOULD CUT DOWN ON YOUR DRINKING: YES
PAROXYSMAL SWEATS: NO SWEAT VISIBLE
TOTAL SCORE: 4
TOTAL SCORE: 4
HEADACHE, FULLNESS IN HEAD: NOT PRESENT
ON A TYPICAL DAY WHEN YOU DRINK ALCOHOL HOW MANY DRINKS DO YOU HAVE: 7
CONSUMPTION TOTAL: POSITIVE
AGITATION: SOMEWHAT MORE THAN NORMAL ACTIVITY
ALCOHOL_USE: YES
SUBSTANCE_ABUSE: 1
AUDITORY DISTURBANCES: NOT PRESENT
ANXIETY: MODERATELY ANXIOUS OR GUARDED, SO ANXIETY IS INFERRED
TREMOR: NO TREMOR
VISUAL DISTURBANCES: NOT PRESENT
AVERAGE NUMBER OF DAYS PER WEEK YOU HAVE A DRINK CONTAINING ALCOHOL: 7
NAUSEA AND VOMITING: NO NAUSEA AND NO VOMITING
EVER HAD A DRINK FIRST THING IN THE MORNING TO STEADY YOUR NERVES TO GET RID OF A HANGOVER: YES
TOTAL SCORE: 5
EVER FELT BAD OR GUILTY ABOUT YOUR DRINKING: YES
HOW MANY TIMES IN THE PAST YEAR HAVE YOU HAD 5 OR MORE DRINKS IN A DAY: 300
TOTAL SCORE: 4

## 2019-07-30 ASSESSMENT — COGNITIVE AND FUNCTIONAL STATUS - GENERAL
SUGGESTED CMS G CODE MODIFIER MOBILITY: CH
DAILY ACTIVITIY SCORE: 24
MOBILITY SCORE: 24
SUGGESTED CMS G CODE MODIFIER DAILY ACTIVITY: CH

## 2019-07-30 ASSESSMENT — COPD QUESTIONNAIRES
DO YOU EVER COUGH UP ANY MUCUS OR PHLEGM?: YES, EVERY DAY
HAVE YOU SMOKED AT LEAST 100 CIGARETTES IN YOUR ENTIRE LIFE: YES
COPD SCREENING SCORE: 4
DURING THE PAST 4 WEEKS HOW MUCH DID YOU FEEL SHORT OF BREATH: NONE/LITTLE OF THE TIME
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
## 2019-07-30 NOTE — ASSESSMENT & PLAN NOTE
History of HTN  On propranolol at home   Hold at this time, if need to restart, consider switching to more another class as Propanolol is not good for BP control anyways.

## 2019-07-30 NOTE — DISCHARGE PLANNING
Anticipated Discharge Disposition:   unknown    Action:   Assessment completed.  Patient very anxious concerning being admitted to hospital.  She requested RN CM stop by her resident and inform her significant other of her admission.  She states if he does not know, he will continue to do drugs.  Patient requested RN CM also contact her Aunt, Sydni Braxton [phone] concerning admission to hospital.  Patient's Aunt contact and notified of patient's admission.  Aunt informed that patient is currently on legal hold and could not receive visitors at this time.  Aunt states she appreciates the call as she is the only family member the patient has contact with at this time.    Barriers to Discharge:   Legal hold  Medical clearance    Plan:   Continue to check in with patient to address any needs or concerns while hospitalized.

## 2019-07-30 NOTE — ASSESSMENT & PLAN NOTE
Query etiology, underlying alcoholism/cirrhosis?  Repeat CBC as an outpatient with primary care follow-up  Improved
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
## 2019-07-30 NOTE — ASSESSMENT & PLAN NOTE
Propranolol a poor choice for HTN  To follow-up with primary care provider for further management, would choose a more appropriate regimen such as amlodipine
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
## 2019-07-30 NOTE — ASSESSMENT & PLAN NOTE
-Patient has history of previous suicide attempt, no psych notes on chart. Not sure from patient's history if she was following any psychiatry before. Per patient, she used to take 'Ativan' before for her psych issues.   - Psychiatry consulted, appreciate help.   - On legal hold for current suicide attempt.

## 2019-07-30 NOTE — ASSESSMENT & PLAN NOTE
Resume home levothyroxine at 75 mcg p.o. daily  TSH normal late last year, repeat 3.69.  Not causative etiology of current psychosis

## 2019-07-30 NOTE — ASSESSMENT & PLAN NOTE
- Patient reported taking 50 pills of propranolol and 30 of gabapentin, Monitored overnight in ICU for hemodynamic unstability, she has been stable.   - Poison control contacted from ER: Case Number [id number]  - Patient currently on legal hold and sitter, psych following. Appreciate help.    - Medically clear.

## 2019-07-30 NOTE — ASSESSMENT & PLAN NOTE
Mild, no bleeding clinically, query related to underlying chronic health conditions/medications/EtOH  Outpatient follow-up for repeat CBC
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
## 2019-07-30 NOTE — ASSESSMENT & PLAN NOTE
Intoxicated mildly on admission with BA 0.16  No signs of alcohol withdrawal  Vitamin replacement  Medically stable for discharge to inpatient psychiatric facility

## 2019-07-30 NOTE — ASSESSMENT & PLAN NOTE
Clinically appears to be more manic, query amphetamines abuse disorder vs her underlying psychiatric disorder  Psychiatry recommendations appreciated  Zyprexa ordered by psychiatry  Transfer for inpatient psychiatric treatment
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
## 2019-07-30 NOTE — ASSESSMENT & PLAN NOTE
Currently on Zyprexa  Psychiatric consultation appreciated  Encourage cessation of methamphetamine and other substances of abuse

## 2019-07-30 NOTE — ASSESSMENT & PLAN NOTE
Very mild, obtain serial fingersticks or serum glucose as needed, sliding scale insulin as necessary, hypoglycemia protocols if SSI started  Resolved today
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
## 2019-07-30 NOTE — ASSESSMENT & PLAN NOTE
Secondary to underlying alcoholic liver disease with AST greater than ALT pattern and alcoholic hepatitis?  Serial CMP stable  Avoid hepatotoxins  Right upper quadrant ultrasound consistent with steatosis  Outpatient follow-up with primary care provider for recheck and possible referral

## 2019-07-30 NOTE — ASSESSMENT & PLAN NOTE
Patient with history of alcohol use, current everyday drinker.    Blood alcohol level 0.16 on admission  Will monitor for signs of withdrawal, low threshold   Started PO Thiamine and Folic acid  Aspiration, Fall and seizure precaution.

## 2019-07-30 NOTE — PROGRESS NOTES
2 RN skin check completed on admission with Mary VILLAR.  Devices in place: N/A   Skin assessed under devices N/A  Confirmed pressure ulcers found on N/A  New potential pressure ulcers noted on: N/A  Wound consult placed N/A  The following interventions in place: Pt self turns, Up to BR.

## 2019-07-30 NOTE — PROGRESS NOTES
Bedside report received from ER RN, Mine.  Pt taken to T610 in [facility]. Pt speaking very quickly, crying intermittently. Asking for food, order obtained from Dr. Franco for diet.
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
## 2019-07-30 NOTE — ASSESSMENT & PLAN NOTE
Elevated liver enzymes: , ALT 93, alcoholic pattern. Trending down.   Ordered US liver   about cessation when appropriate

## 2019-07-31 ENCOUNTER — APPOINTMENT (OUTPATIENT)
Dept: RADIOLOGY | Facility: MEDICAL CENTER | Age: 44
DRG: 918 | End: 2019-07-31
Attending: STUDENT IN AN ORGANIZED HEALTH CARE EDUCATION/TRAINING PROGRAM
Payer: MEDICARE

## 2019-07-31 LAB
ALBUMIN SERPL BCP-MCNC: 2.9 G/DL (ref 3.2–4.9)
ALBUMIN/GLOB SERPL: 1.5 G/DL
ALP SERPL-CCNC: 85 U/L (ref 30–99)
ALT SERPL-CCNC: 67 U/L (ref 2–50)
ANION GAP SERPL CALC-SCNC: 6 MMOL/L (ref 0–11.9)
ANION GAP SERPL CALC-SCNC: 9 MMOL/L (ref 0–11.9)
AST SERPL-CCNC: 93 U/L (ref 12–45)
BILIRUB SERPL-MCNC: 0.9 MG/DL (ref 0.1–1.5)
BUN SERPL-MCNC: 8 MG/DL (ref 8–22)
BUN SERPL-MCNC: 9 MG/DL (ref 8–22)
CALCIUM SERPL-MCNC: 7.8 MG/DL (ref 8.5–10.5)
CALCIUM SERPL-MCNC: 8.3 MG/DL (ref 8.5–10.5)
CHLORIDE SERPL-SCNC: 107 MMOL/L (ref 96–112)
CHLORIDE SERPL-SCNC: 107 MMOL/L (ref 96–112)
CO2 SERPL-SCNC: 21 MMOL/L (ref 20–33)
CO2 SERPL-SCNC: 24 MMOL/L (ref 20–33)
CREAT SERPL-MCNC: 0.65 MG/DL (ref 0.5–1.4)
CREAT SERPL-MCNC: 0.65 MG/DL (ref 0.5–1.4)
ERYTHROCYTE [DISTWIDTH] IN BLOOD BY AUTOMATED COUNT: 57.2 FL (ref 35.9–50)
GLOBULIN SER CALC-MCNC: 2 G/DL (ref 1.9–3.5)
GLUCOSE SERPL-MCNC: 113 MG/DL (ref 65–99)
GLUCOSE SERPL-MCNC: 140 MG/DL (ref 65–99)
HCT VFR BLD AUTO: 35.4 % (ref 37–47)
HGB BLD-MCNC: 11.4 G/DL (ref 12–16)
MAGNESIUM SERPL-MCNC: 2.4 MG/DL (ref 1.5–2.5)
MCH RBC QN AUTO: 29.5 PG (ref 27–33)
MCHC RBC AUTO-ENTMCNC: 32.2 G/DL (ref 33.6–35)
MCV RBC AUTO: 91.5 FL (ref 81.4–97.8)
PLATELET # BLD AUTO: 125 K/UL (ref 164–446)
PMV BLD AUTO: 10.2 FL (ref 9–12.9)
POTASSIUM SERPL-SCNC: 3.5 MMOL/L (ref 3.6–5.5)
POTASSIUM SERPL-SCNC: 3.6 MMOL/L (ref 3.6–5.5)
PROT SERPL-MCNC: 4.9 G/DL (ref 6–8.2)
RBC # BLD AUTO: 3.87 M/UL (ref 4.2–5.4)
SODIUM SERPL-SCNC: 137 MMOL/L (ref 135–145)
SODIUM SERPL-SCNC: 137 MMOL/L (ref 135–145)
WBC # BLD AUTO: 4.3 K/UL (ref 4.8–10.8)

## 2019-07-31 PROCEDURE — 99231 SBSQ HOSP IP/OBS SF/LOW 25: CPT | Performed by: PSYCHIATRY & NEUROLOGY

## 2019-07-31 PROCEDURE — 80053 COMPREHEN METABOLIC PANEL: CPT

## 2019-07-31 PROCEDURE — 99233 SBSQ HOSP IP/OBS HIGH 50: CPT | Performed by: INTERNAL MEDICINE

## 2019-07-31 PROCEDURE — A9270 NON-COVERED ITEM OR SERVICE: HCPCS | Performed by: STUDENT IN AN ORGANIZED HEALTH CARE EDUCATION/TRAINING PROGRAM

## 2019-07-31 PROCEDURE — 76705 ECHO EXAM OF ABDOMEN: CPT

## 2019-07-31 PROCEDURE — 700111 HCHG RX REV CODE 636 W/ 250 OVERRIDE (IP): Performed by: STUDENT IN AN ORGANIZED HEALTH CARE EDUCATION/TRAINING PROGRAM

## 2019-07-31 PROCEDURE — 700102 HCHG RX REV CODE 250 W/ 637 OVERRIDE(OP): Performed by: STUDENT IN AN ORGANIZED HEALTH CARE EDUCATION/TRAINING PROGRAM

## 2019-07-31 PROCEDURE — A9270 NON-COVERED ITEM OR SERVICE: HCPCS | Performed by: PSYCHIATRY & NEUROLOGY

## 2019-07-31 PROCEDURE — 770001 HCHG ROOM/CARE - MED/SURG/GYN PRIV*

## 2019-07-31 PROCEDURE — 85027 COMPLETE CBC AUTOMATED: CPT

## 2019-07-31 PROCEDURE — 700102 HCHG RX REV CODE 250 W/ 637 OVERRIDE(OP): Performed by: PSYCHIATRY & NEUROLOGY

## 2019-07-31 RX ORDER — OLANZAPINE 5 MG/1
10 TABLET ORAL
Status: DISCONTINUED | OUTPATIENT
Start: 2019-07-31 | End: 2019-08-01 | Stop reason: HOSPADM

## 2019-07-31 RX ORDER — THIAMINE MONONITRATE (VIT B1) 100 MG
100 TABLET ORAL DAILY
Status: DISCONTINUED | OUTPATIENT
Start: 2019-07-31 | End: 2019-08-01 | Stop reason: HOSPADM

## 2019-07-31 RX ORDER — POTASSIUM CHLORIDE 20 MEQ/1
40 TABLET, EXTENDED RELEASE ORAL ONCE
Status: COMPLETED | OUTPATIENT
Start: 2019-07-31 | End: 2019-07-31

## 2019-07-31 RX ORDER — FOLIC ACID 1 MG/1
1 TABLET ORAL DAILY
Status: DISCONTINUED | OUTPATIENT
Start: 2019-07-31 | End: 2019-08-01 | Stop reason: HOSPADM

## 2019-07-31 RX ADMIN — ENOXAPARIN SODIUM 40 MG: 100 INJECTION SUBCUTANEOUS at 06:00

## 2019-07-31 RX ADMIN — POTASSIUM CHLORIDE 40 MEQ: 1500 TABLET, EXTENDED RELEASE ORAL at 07:47

## 2019-07-31 RX ADMIN — Medication 100 MG: at 12:35

## 2019-07-31 RX ADMIN — OLANZAPINE 10 MG: 5 TABLET, FILM COATED ORAL at 19:28

## 2019-07-31 RX ADMIN — Medication 1 LOZENGE: at 18:44

## 2019-07-31 RX ADMIN — FOLIC ACID 1 MG: 1 TABLET ORAL at 12:35

## 2019-07-31 RX ADMIN — Medication 1 LOZENGE: at 15:29

## 2019-07-31 RX ADMIN — Medication 1 LOZENGE: at 12:19

## 2019-07-31 ASSESSMENT — LIFESTYLE VARIABLES
ANXIETY: MODERATELY ANXIOUS OR GUARDED, SO ANXIETY IS INFERRED
PAROXYSMAL SWEATS: NO SWEAT VISIBLE
NAUSEA AND VOMITING: NO NAUSEA AND NO VOMITING
AGITATION: SOMEWHAT MORE THAN NORMAL ACTIVITY
AGITATION: SOMEWHAT MORE THAN NORMAL ACTIVITY
VISUAL DISTURBANCES: NOT PRESENT
ANXIETY: MILDLY ANXIOUS
HEADACHE, FULLNESS IN HEAD: NOT PRESENT
VISUAL DISTURBANCES: NOT PRESENT
PAROXYSMAL SWEATS: NO SWEAT VISIBLE
ORIENTATION AND CLOUDING OF SENSORIUM: ORIENTED AND CAN DO SERIAL ADDITIONS
HEADACHE, FULLNESS IN HEAD: NOT PRESENT
SUBSTANCE_ABUSE: 1
TOTAL SCORE: 2
AGITATION: *
TREMOR: NO TREMOR
NAUSEA AND VOMITING: MILD NAUSEA WITH NO VOMITING
VISUAL DISTURBANCES: NOT PRESENT
NAUSEA AND VOMITING: NO NAUSEA AND NO VOMITING
AUDITORY DISTURBANCES: NOT PRESENT
AUDITORY DISTURBANCES: NOT PRESENT
TOTAL SCORE: 6
HEADACHE, FULLNESS IN HEAD: NOT PRESENT
ORIENTATION AND CLOUDING OF SENSORIUM: ORIENTED AND CAN DO SERIAL ADDITIONS
ANXIETY: MODERATELY ANXIOUS OR GUARDED, SO ANXIETY IS INFERRED
PAROXYSMAL SWEATS: NO SWEAT VISIBLE
TREMOR: NO TREMOR
TOTAL SCORE: 6
ORIENTATION AND CLOUDING OF SENSORIUM: ORIENTED AND CAN DO SERIAL ADDITIONS
AUDITORY DISTURBANCES: NOT PRESENT
TREMOR: NO TREMOR

## 2019-07-31 ASSESSMENT — ENCOUNTER SYMPTOMS
BLURRED VISION: 0
CHILLS: 0
DOUBLE VISION: 0
SHORTNESS OF BREATH: 0
DIARRHEA: 0
STRIDOR: 0
PALPITATIONS: 0
VOMITING: 0
SENSORY CHANGE: 0
CONSTIPATION: 0
COUGH: 0
TREMORS: 0
HALLUCINATIONS: 0
FEVER: 0
DIZZINESS: 0
SPUTUM PRODUCTION: 0
TINGLING: 0
MYALGIAS: 0
NAUSEA: 1
ABDOMINAL PAIN: 0
FOCAL WEAKNESS: 0
HALLUCINATIONS: 1
NERVOUS/ANXIOUS: 1
HEADACHES: 0
DEPRESSION: 1

## 2019-07-31 ASSESSMENT — PATIENT HEALTH QUESTIONNAIRE - PHQ9
6. FEELING BAD ABOUT YOURSELF - OR THAT YOU ARE A FAILURE OR HAVE LET YOURSELF OR YOUR FAMILY DOWN: SEVERAL DAYS
2. FEELING DOWN, DEPRESSED, IRRITABLE, OR HOPELESS: SEVERAL DAYS
3. TROUBLE FALLING OR STAYING ASLEEP OR SLEEPING TOO MUCH: SEVERAL DAYS
SUM OF ALL RESPONSES TO PHQ QUESTIONS 1-9: 9
8. MOVING OR SPEAKING SO SLOWLY THAT OTHER PEOPLE COULD HAVE NOTICED. OR THE OPPOSITE, BEING SO FIGETY OR RESTLESS THAT YOU HAVE BEEN MOVING AROUND A LOT MORE THAN USUAL: SEVERAL DAYS
4. FEELING TIRED OR HAVING LITTLE ENERGY: SEVERAL DAYS
7. TROUBLE CONCENTRATING ON THINGS, SUCH AS READING THE NEWSPAPER OR WATCHING TELEVISION: SEVERAL DAYS
9. THOUGHTS THAT YOU WOULD BE BETTER OFF DEAD, OR OF HURTING YOURSELF: SEVERAL DAYS
5. POOR APPETITE OR OVEREATING: SEVERAL DAYS
SUM OF ALL RESPONSES TO PHQ9 QUESTIONS 1 AND 2: 2
1. LITTLE INTEREST OR PLEASURE IN DOING THINGS: SEVERAL DAYS

## 2019-07-31 NOTE — PROGRESS NOTES
[facility] ICU Progress Note      Admit Date: 7/30/2019    Resident(s): Laura Carrasco M.D.   Attending:  JARRED SANCHEZ/ Dr. Franco    Patient ID:    Name:  Nancy Olvera   YOB: 1975  Age:  44 y.o.  female   MRN:  [id number]    Hospital Course (carried forward and updated):  Nancy Olvera is a 44 y.o. female with a past medical history of Depression with previous suicidal attempts (not on any psychiatric meds currently), HTN (On Propranolol), h/o seizure disorder (patient reported, no documents /neurology note on chart, was taking depakote and Gabapentine), presented to ER after intentional overdose with 50 of Propranolol and 30 of Gabapentin. H/O alcohol abuse and meth use, last use the before coming to hospital. Point to note, patient has been changing history multiple times, so not clear why she took the pills / what exactly happened. In the ER she was hemodynamically stable with HR 90s-100s, BP 110s/70s, and saturation 96% on room air, Labs were significant for K of 2.8 and Mg of 1.7. She was placed on legal hold and admitted to ICU for close monitoring in setting of Propanolol OD.     Consultants:  Critical Care  Psychiatry     Interval Events:  -No acute overnight events.   -Hemodynamically stable, HR in 70-90s, -140s/70s. On RA  - No alc withdrawal signs noted. Started PO Thiamine and Folic acid.   - Patient asking for Ativan to 'calm' her down. Then was angry as 'doctors' here don't give her pills to help her. She states that she feels horrible and is in pain all over. Otherwise no acute complains.   - Narc check for last 2 years shows she has been going to different providers for opoid prescriptions. 7 different providers found in 8 different prescription, all short term, in 3 different pharmacies with a OD score of 440.   -Medical clearance form signed today.   -Legal hold / psych meds per psych.     Review of Systems   Constitutional: Negative for chills and fever.   HENT: Negative  "for hearing loss and tinnitus.    Eyes: Negative for blurred vision and double vision.   Cardiovascular: Negative for chest pain, palpitations and leg swelling.   Gastrointestinal: Positive for nausea. Negative for abdominal pain, constipation, diarrhea and vomiting.   Genitourinary: Negative for dysuria, frequency and urgency.   Skin: Negative for itching and rash.   Neurological: Negative for dizziness, tingling, tremors and headaches.   Psychiatric/Behavioral: Positive for depression. Negative for hallucinations and suicidal ideas (No active plans ). The patient is nervous/anxious.        PHYSICAL EXAM:  Vitals:    07/31/19 0500 07/31/19 0600 07/31/19 0700 07/31/19 0800   BP: 127/73 158/87 141/89 130/79   Pulse: 92 88 88 82   Resp:  18     Temp:  36.7 °C (98 °F)  36.7 °C (98.1 °F)   TempSrc:  Temporal  Temporal   SpO2: 96% 98% 96% 97%   Weight:  67.8 kg (149 lb 7.6 oz)     Height:        Body mass index is 26.48 kg/m².  Latest Vitals:  /79   Pulse 82   Temp 36.7 °C (98.1 °F) (Temporal)   Resp 18   Ht 1.6 m (5' 3\")   Wt 67.8 kg (149 lb 7.6 oz)   LMP 07/26/2019   SpO2 97%   BMI 26.48 kg/m²   O2 therapy: Pulse Oximetry: 97 %, O2 (LPM): 0, O2 Delivery: None (Room Air)  Vitals Range last 24h:  Temp:  [35.7 °C (96.2 °F)-36.7 °C (98.1 °F)] 36.7 °C (98.1 °F)  Pulse:  [80-96] 82  Resp:  [18-23] 18  BP: (127-158)/(73-89) 130/79  SpO2:  [94 %-99 %] 97 %       Intake/Output Summary (Last 24 hours) at 7/31/2019 1201  Last data filed at 7/31/2019 0600  Gross per 24 hour   Intake 1040 ml   Output --   Net 1040 ml        Physical Exam   Constitutional: She is oriented to person, place, and time.   Lying comfortably in bed. Not in any acute distress. Completing full sentence with out any difficulty    HENT:   Head: Normocephalic and atraumatic.   Eyes: Pupils are equal, round, and reactive to light. Conjunctivae and EOM are normal.   Neck: Normal range of motion. Neck supple.   Cardiovascular: Normal rate, regular " rhythm, normal heart sounds and intact distal pulses.   Pulmonary/Chest: Effort normal and breath sounds normal. No respiratory distress. She has no wheezes.   Abdominal: Soft. Bowel sounds are normal. She exhibits no distension.   Musculoskeletal: Normal range of motion. She exhibits no edema.   Neurological: She is alert and oriented to person, place, and time.   No focal signs    Skin: Skin is warm. No erythema.   Psychiatric:   Appears manic, changing history constantly            Recent Labs     07/30/19 0914 07/30/19 1830 07/31/19  0530   SODIUM 140 137 137   POTASSIUM 2.8* 3.6 3.5*   CHLORIDE 108 107 107   CO2 20 21 24   BUN 7* 9 8   CREATININE 0.64 0.65 0.65   MAGNESIUM 1.7 2.4  --    PHOSPHORUS 2.3*  --   --    CALCIUM 8.1* 8.3* 7.8*     Recent Labs     07/30/19 0914 07/30/19 1830 07/31/19  0530   ALTSGPT 93*  --  67*   ASTSGOT 205*  --  93*   ALKPHOSPHAT 81  --  85   TBILIRUBIN 0.7  --  0.9   GLUCOSE 133* 140* 113*     Recent Labs     07/30/19 0914 07/31/19  0418   RBC 4.62 3.87*   HEMOGLOBIN 13.3 11.4*   HEMATOCRIT 40.9 35.4*   PLATELETCT 151* 125*     Recent Labs     07/30/19 0914 07/31/19 0418 07/31/19  0530   WBC 3.9* 4.3*  --    NEUTSPOLYS 42.50*  --   --    LYMPHOCYTES 30.90  --   --    MONOCYTES 7.70  --   --    EOSINOPHILS 17.30*  --   --    BASOPHILS 1.30  --   --    ASTSGOT 205*  --  93*   ALTSGPT 93*  --  67*   ALKPHOSPHAT 81  --  85   TBILIRUBIN 0.7  --  0.9       Meds:  • OLANZapine  10 mg     • thiamine  100 mg     • folic acid  1 mg     • senna-docusate  2 Tab      And   • polyethylene glycol/lytes  1 Packet      And   • magnesium hydroxide  30 mL      And   • bisacodyl  10 mg     • Respiratory Care per Protocol       • enoxaparin  40 mg     • albuterol  2 Puff     • acetaminophen  500 mg     • ondansetron  4 mg     • nicotine  14 mg     • menthol  1 Lozenge     • LORazepam  2 mg          Procedures:  NA    Imaging:  US-RUQ   Final Result      Echogenic liver, a nonspecific finding  that often is found to represent steatosis.  Other infiltrative processes could have an identical appearance.          Problem and Plan:    * Intentional drug overdose (HCC)- (present on admission)  Assessment & Plan  - Patient reported taking 50 pills of propranolol and 30 of gabapentin, Monitored overnight in ICU for hemodynamic unstability, she has been stable.   - Poison control contacted from ER: Case Number [id number]  - Patient currently on legal hold and sitter, psych following. Appreciate help.    - Medically clear.     Manic depressive illness (HCC)  Assessment & Plan  Patient currently appears manic, not on any meds.   Psych following  Appreciate help.    Depression- (present on admission)  Assessment & Plan  -Patient has history of previous suicide attempt, no psych notes on chart. Not sure from patient's history if she was following any psychiatry before. Per patient, she used to take 'Ativan' before for her psych issues.   - Psychiatry consulted, appreciate help.   - On legal hold for current suicide attempt.     Alcoholism (HCC)- (present on admission)  Assessment & Plan  Patient with history of alcohol use, current everyday drinker.    Blood alcohol level 0.16 on admission  Will monitor for signs of withdrawal, low threshold   Started PO Thiamine and Folic acid  Aspiration, Fall and seizure precaution.       Suicide and self-inflicted poisoning by drugs and medicinal substances (HCC)  Assessment & Plan  See Intentional OD    Thrombocytopenia (HCC)- (present on admission)  Assessment & Plan  Likely from Alcoholic liver disease.  Stable  No active bleeding     Leukopenia- (present on admission)  Assessment & Plan  Leukopenia, likley from Alcoholic liver disease.        Hypothyroidism- (present on admission)  Assessment & Plan  History of hypothyroidism on levothyroxine   Continue levothyroxine     Essential hypertension  Assessment & Plan  History of HTN  On propranolol at home   Hold at this time, if  need to restart, consider switching to more another class as Propanolol is not good for BP control anyways.     Elevated liver function tests  Assessment & Plan  Elevated liver enzymes: , ALT 93, alcoholic pattern. Trending down.   Ordered US liver   about cessation when appropriate     Hypokalemia- (present on admission)  Assessment & Plan  Replete as needed      DISPO: ICU/ medically clear. Can go to floor     CODE STATUS: Full    Quality Measures:  Feeding: Regular  Analgesia: NA  Sedation: NA  Thromboprophylaxis: Enoxaparin  Head of bed: >30 degrees  Ulcer prophylaxis: NA  Glycemic control: NA  Bowel care: bowel regimen  Indwelling lines: NA  Deescalation of antibiotics: NA      Laura Carrasco M.D.

## 2019-07-31 NOTE — DISCHARGE PLANNING
Received Choice form at [id number]  Agency/Facility Name: [facility]  Referral sent per Choice form @ [id number]      Patient has a history of hyperlipidemia.  She has been managing it with diet and exercise.

## 2019-07-31 NOTE — DISCHARGE PLANNING
Care Transition Team Discharge Planning    Anticipated Discharge Disposition: d/c to [facility]    Action: RN indicates pt is requesting to speak w/ LSW.    Met w/ pt at bedside. [facility]. She is attempting to get someone to contact her  at home and suggests this Lsw fly over to their home to talk w/ her . Lsw indicates unable to do this. [facility] and does not need further f/u there.    Pt provided [facility] to include [facility]. Pt states she has a therapist at [facility] she likes. Lsw explained the 1st open bed will be where pt d/manisha. However, we will try to get [facility] first as pt's preference.    LSw updated CCA as above. Pt aware may not get [facility], but medical team will try to acquire open bed at [facility].    Barriers to Discharge: an open bed, INS AUTH, legal hold ext by psych, transport to accepting [facility]     Plan: f/u w/ [facility], psych, medical team

## 2019-07-31 NOTE — CARE PLAN
"Problem: Knowledge Deficit  Goal: Knowledge of disease process/condition, treatment plan, diagnostic tests, and medications will improve    Intervention: Assess knowledge level of disease process/condition, treatment plan, diagnostic tests, and medications  Pt has very elaborate stories in regards to health Hx. States she believed she has a GIB and she vomits blood (no episode of hematemsis) also thinks she will start to withdrawal 6 hours after last drink.  This RN did her best to educate patient of her current presenting symptoms and how they are not correlating to what she may think she has going on.       Problem: Medication  Goal: Compliance with prescribed medication will improve    Intervention: Educate patient and significant other/support system medication rationale and regimen  Asking for \"withdrawal medS\". RN discussed that if she actually ingested all the gabapentin she stated she did that would not be safe, which is why the MD's have not ordered that at this time.         "

## 2019-07-31 NOTE — DISCHARGE PLANNING
Care Transition Team Discharge Planning    Anticipated Discharge Disposition: d/c to mental health hospital    Action: Received completed pages one and two of legal hold.    Lsw faxed both pages to CHRISTOPHER and Marielena, legal worker. Received fax confirmations.     Barriers to Discharge: acceptance to mental health hospital, assessment by psych team at [facility]-extend or discontinue [facility]    Plan: f/u w/ psych, medical team, Marielena WHITE

## 2019-07-31 NOTE — PROGRESS NOTES
Assumed care of patient. 1:1 CNA sitter at bedside. Patient displays behavior of ora, her story of admit situation continues to change. Patient now states she did not ingest any pills as there was no way they stayed inside of her after she projectile vomited from the taste. Updated to MD to this statement. Patient resting in bed. VSS. Tele box in place. NPO for US.
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
## 2019-07-31 NOTE — DISCHARGE PLANNING
Care Transition Team Discharge Planning    Anticipated Discharge Disposition: d/c to [facility] if remains on legal hold once medically cleared    Action: pt was a/o yesterday per resident working 7/30    Texted today's assigned resident. They will sign certification at bottom of second page, legal hold documents.     Lsw placed document on LSw desk in care coordination office on T6. MD aware of location of document.      Barriers to Discharge: TBD    Plan: once receive page one and two will fax to legal hold worker for filing w/ courts

## 2019-07-31 NOTE — DISCHARGE PLANNING
Agency/Facility Name: [facility]  Spoke To: Roslyn  Outcome: They stated that they did not receive the referral, resent.

## 2019-07-31 NOTE — CARE PLAN
Patient educated to use call light for assistance. Fall precautions in place. Staff will assist with mobilization. Hourly rounding in place.
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
## 2019-08-01 VITALS
TEMPERATURE: 97.9 F | RESPIRATION RATE: 16 BRPM | HEIGHT: 63 IN | WEIGHT: 149.47 LBS | OXYGEN SATURATION: 96 % | HEART RATE: 82 BPM | DIASTOLIC BLOOD PRESSURE: 76 MMHG | BODY MASS INDEX: 26.48 KG/M2 | SYSTOLIC BLOOD PRESSURE: 118 MMHG

## 2019-08-01 LAB
ALBUMIN SERPL BCP-MCNC: 2.9 G/DL (ref 3.2–4.9)
ALBUMIN/GLOB SERPL: 1.5 G/DL
ALP SERPL-CCNC: 74 U/L (ref 30–99)
ALT SERPL-CCNC: 68 U/L (ref 2–50)
ANION GAP SERPL CALC-SCNC: 6 MMOL/L (ref 0–11.9)
AST SERPL-CCNC: 97 U/L (ref 12–45)
BILIRUB SERPL-MCNC: 0.7 MG/DL (ref 0.1–1.5)
BUN SERPL-MCNC: 5 MG/DL (ref 8–22)
CALCIUM SERPL-MCNC: 8.3 MG/DL (ref 8.5–10.5)
CHLORIDE SERPL-SCNC: 108 MMOL/L (ref 96–112)
CO2 SERPL-SCNC: 22 MMOL/L (ref 20–33)
CREAT SERPL-MCNC: 0.6 MG/DL (ref 0.5–1.4)
GLOBULIN SER CALC-MCNC: 2 G/DL (ref 1.9–3.5)
GLUCOSE SERPL-MCNC: 94 MG/DL (ref 65–99)
MAGNESIUM SERPL-MCNC: 1.9 MG/DL (ref 1.5–2.5)
POTASSIUM SERPL-SCNC: 4.1 MMOL/L (ref 3.6–5.5)
PROT SERPL-MCNC: 4.9 G/DL (ref 6–8.2)
SODIUM SERPL-SCNC: 136 MMOL/L (ref 135–145)
TSH SERPL DL<=0.005 MIU/L-ACNC: 3.69 UIU/ML (ref 0.38–5.33)

## 2019-08-01 PROCEDURE — 99232 SBSQ HOSP IP/OBS MODERATE 35: CPT | Mod: GC | Performed by: PSYCHIATRY & NEUROLOGY

## 2019-08-01 PROCEDURE — 700102 HCHG RX REV CODE 250 W/ 637 OVERRIDE(OP): Performed by: STUDENT IN AN ORGANIZED HEALTH CARE EDUCATION/TRAINING PROGRAM

## 2019-08-01 PROCEDURE — A9270 NON-COVERED ITEM OR SERVICE: HCPCS | Performed by: STUDENT IN AN ORGANIZED HEALTH CARE EDUCATION/TRAINING PROGRAM

## 2019-08-01 PROCEDURE — 99239 HOSP IP/OBS DSCHRG MGMT >30: CPT | Performed by: INTERNAL MEDICINE

## 2019-08-01 PROCEDURE — 80053 COMPREHEN METABOLIC PANEL: CPT

## 2019-08-01 PROCEDURE — 84443 ASSAY THYROID STIM HORMONE: CPT

## 2019-08-01 PROCEDURE — 83735 ASSAY OF MAGNESIUM: CPT

## 2019-08-01 RX ORDER — HYDROXYZINE HYDROCHLORIDE 25 MG/1
25 TABLET, FILM COATED ORAL 3 TIMES DAILY PRN
Status: DISCONTINUED | OUTPATIENT
Start: 2019-08-01 | End: 2019-08-01 | Stop reason: HOSPADM

## 2019-08-01 RX ORDER — FOLIC ACID 1 MG/1
1 TABLET ORAL DAILY
Qty: 30 TAB | Refills: 0 | Status: SHIPPED
Start: 2019-08-02 | End: 2020-03-06

## 2019-08-01 RX ORDER — NICOTINE 21 MG/24HR
1 PATCH, TRANSDERMAL 24 HOURS TRANSDERMAL EVERY 24 HOURS
Qty: 30 PATCH | Refills: 0 | Status: SHIPPED | OUTPATIENT
Start: 2019-08-01 | End: 2019-08-31

## 2019-08-01 RX ORDER — OLANZAPINE 10 MG/1
10 TABLET ORAL
Qty: 30 TAB | Refills: 0 | Status: SHIPPED | OUTPATIENT
Start: 2019-08-01 | End: 2019-08-31

## 2019-08-01 RX ORDER — LANOLIN ALCOHOL/MO/W.PET/CERES
100 CREAM (GRAM) TOPICAL DAILY
Qty: 30 TAB | Refills: 0 | Status: SHIPPED | OUTPATIENT
Start: 2019-08-02 | End: 2019-09-01

## 2019-08-01 RX ADMIN — NICOTINE 14 MG: 14 PATCH, EXTENDED RELEASE TRANSDERMAL at 06:06

## 2019-08-01 RX ADMIN — Medication 100 MG: at 06:06

## 2019-08-01 RX ADMIN — ACETAMINOPHEN 500 MG: 500 TABLET ORAL at 10:01

## 2019-08-01 RX ADMIN — Medication 1 LOZENGE: at 11:58

## 2019-08-01 RX ADMIN — FOLIC ACID 1 MG: 1 TABLET ORAL at 06:06

## 2019-08-01 ASSESSMENT — LIFESTYLE VARIABLES
NAUSEA AND VOMITING: NO NAUSEA AND NO VOMITING
TOTAL SCORE: 1
VISUAL DISTURBANCES: NOT PRESENT
ANXIETY: NO ANXIETY (AT EASE)
ORIENTATION AND CLOUDING OF SENSORIUM: ORIENTED AND CAN DO SERIAL ADDITIONS
AUDITORY DISTURBANCES: NOT PRESENT
AGITATION: SOMEWHAT MORE THAN NORMAL ACTIVITY
HEADACHE, FULLNESS IN HEAD: NOT PRESENT
PAROXYSMAL SWEATS: NO SWEAT VISIBLE
TREMOR: NO TREMOR

## 2019-08-01 ASSESSMENT — ENCOUNTER SYMPTOMS
HEADACHES: 0
NECK PAIN: 0
HEMOPTYSIS: 0
COUGH: 0
NAUSEA: 1
DEPRESSION: 0
PALPITATIONS: 0
CHILLS: 0
FEVER: 0
DOUBLE VISION: 0
HEARTBURN: 0
BLURRED VISION: 0
DIZZINESS: 0
MYALGIAS: 0

## 2019-08-01 NOTE — DISCHARGE PLANNING
Care Transition Team Discharge Planning    Anticipated Discharge Disposition: d/c to [facility]    Action: Met w/ pt at bedside. She is aware she is going to [facility] [place]. She wants to update her  and will [facility]     Barriers to Discharge: none    Plan: pt to d/c to [facility] [place] at 130 PM

## 2019-08-01 NOTE — DISCHARGE PLANNING
Care Transition Team Discharge Planning    Anticipated Discharge Disposition: d/c to [facility]    Action: Bedside Rn indicates Ish is calling RN for report and wants to set up transport.    Lsw called [facility], Turdi, to see which [facility] hospital contacted her first for acceptance.    Trudi indicates [facility] is the only hospital accepting at this time. Accepting MD is Dr Belle and they want pt to be p/u at 130PM.    Lsw began to complete PCS and internal transfer form for [facility] to set up [facility]. Lsw faxed to [facility], located within [facility] and advised of pending fax via phone.     Lsw texted [employer] Resident, Dr Weeks, to request d/c summary be completed.    Lsw began to complete transfer packet and COBRA. Original legal hold placed in packet.          Barriers to Discharge: transport via [facility], comlpeted COBRA and d/c summary w/ d/c order    Plan: f/u w/ medical team, and provide completed transfer packet/cobra to bedside RN
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
## 2019-08-01 NOTE — DISCHARGE PLANNING
Received verified petition from the court. Sent copy to unit [facility] and [facility].
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
## 2019-08-01 NOTE — DISCHARGE PLANNING
Care Transition Team Discharge Planning    Anticipated Discharge Disposition: d/c to mental health hospital     Action: Lsw received IPCSS indicating pt's legal hold has been extended, per resident Dr. Pandey.     Barriers to Discharge: acceptance to mental health hospital    Plan: f/u w/ [facility], medical team,

## 2019-08-01 NOTE — DISCHARGE PLANNING
Care Transition Team Discharge Planning    Anticipated Discharge Disposition: d/c to [facility]    Action: Acquired from MD d/c summary and signature for COBRA.    Lsw provided completed packet to bedside RN.    Barriers to Discharge: none    Plan: pt to d/c to [facility] at 130PM

## 2019-08-01 NOTE — DISCHARGE PLANNING
Care Transition Team Discharge Planning    Anticipated Discharge Disposition: d/c to [facility]     Action: Lsw placed faxed copy of legal hold court documents in the transfer packet.    Lsw updated pulmonologist regarding transfer.     Lsw updated bedside RN regarding transport and provided the transfer packet w/ COBRA.    RN will complete the 2nd Medical Clearance check list and place in transfer packet. RN has phone number to provide report to facility.      Barriers to Discharge: none    Plan: pt to d/c to [facility] at 130PM

## 2019-08-01 NOTE — DISCHARGE PLANNING
Received Transport Form @ 7786  Spoke to Justa @ [place]    Transport is scheduled for 8/1 @8876 going to [place].

## 2019-08-01 NOTE — DISCHARGE PLANNING
Notice of transfer filed with the court via [employer], scanned copy of verified notice onto .

## 2019-08-01 NOTE — CARE PLAN
Problem: Knowledge Deficit  Goal: Knowledge of the prescribed therapeutic regimen will improve  Outcome: PROGRESSING SLOWER THAN EXPECTED  Note:   Educated pt regarding current POC and explained Legal 2000     Problem: Safety  Goal: Will remain free from falls  Note:   Bed locked and in low position with call light and belongings within reach. Treaded socks on pt. Sitter with pt.

## 2019-08-01 NOTE — DISCHARGE SUMMARY
Internal Medicine Discharge Summary  Note Author: Zach Weeks M.D.       Name Nancy Olvera     1975   Age/Sex 44 y.o. female   MRN [id number]         Admit Date:  2019       Discharge Date:   2019    Service:   [facility] Internal Medicine [facility]  Attending Physician(s):   Aditya Franco       Senior Resident(s):   Zach Weeks / Jaylene Carrasco  Judah Resident(s):   -  PCP: CECI Fatima      Primary Diagnosis:   Intentional drug overdose with propanolol    Secondary Diagnoses:                Principal Problem:    Intentional drug overdose (HCC) POA: Yes  Active Problems:    Hypokalemia POA: Yes    Elevated liver function tests POA: Unknown    Alcoholism (HCC) POA: Yes    Depression POA: Yes    Manic depressive illness (HCC) POA: Unknown    Essential hypertension POA: Unknown    Hypothyroidism POA: Yes    Hyperglycemia POA: Unknown    Leukopenia POA: Yes    Thrombocytopenia (HCC) POA: Yes    Suicide and self-inflicted poisoning by drugs and medicinal substances (HCC) POA: Unknown  Resolved Problems:    * No resolved hospital problems. *      Hospital Summary (Brief Narrative):       Nancy Olvera is 44 y.o. with a past medical history of Depression with previous suicidal attempts (not on any psychiatric meds currently), HTN (On Propranolol), h/o seizure disorder (patient reported, no documents /neurology note on chart, was taking depakote and Gabapentin, no recent episode in last 12 years), presented to ER after intentional overdose with 50 of Propranolol and 30 of Gabapentin. H/O alcohol abuse and meth use, last use before coming to hospital. Point to note, patient has been changing history multiple times, most consistent history is she impulsively took those pills and then immediately vomited.     In the ER she was hemodynamically stable with HR 90s-100s, BP 110s/70s, and saturation 96% on room air, Labs were significant for K of 2.8 and Mg of 1.7, UDS positive for meth.  Poison control was contacted. She was placed on legal hold and admitted to ICU for close monitoring in setting of Propanolol and Gabapentin OD. Psychiatry was consulted for Suicidal ideation and psychiatric medication management. Patient improved and has been completely alert and oriented with stable vitals. No alcohol withdrawal symptoms were seen. She has been tangential, unstable mood and depressed through out the ICU stay. Psychiatry team evaluated her, started her on Olanzapine, extended legal hold and recommended acute psychiatric hospitalization for stabilization of Acute Bipolar I disorder. Medical clearance form has been signed on 07/31.    Patient /Hospital Summary (Details -- Problem Oriented) :          Suicide and self-inflicted poisoning by drugs and medicinal substances (HCC)  Assessment & Plan  See Intentional OD    Thrombocytopenia (HCC)  Assessment & Plan  Likely from Alcoholic liver disease.  Stable  No active bleeding     Leukopenia  Assessment & Plan  Leukopenia, likley from Alcoholic liver disease.        Hypothyroidism  Assessment & Plan  History of hypothyroidism on levothyroxine   Continue levothyroxine     Essential hypertension  Assessment & Plan  History of HTN  On propranolol at home   Hold at this time, if need to restart, consider switching to more another class as Propanolol is not good for BP control anyways.     Manic depressive illness (HCC)  Assessment & Plan  Patient currently appears manic, not on any meds.   Psych following  Appreciate help.    Depression  Assessment & Plan  -Patient has history of previous suicide attempt, no psych notes on chart. Not sure from patient's history if she was following any psychiatry before. Per patient, she used to take 'Ativan' before for her psych issues.   - Psychiatry consulted, appreciate help.   - On legal hold for current suicide attempt.     Alcoholism (HCC)  Assessment & Plan  Patient with history of alcohol use, current everyday  drinker.    Blood alcohol level 0.16 on admission  Will monitor for signs of withdrawal, low threshold   Started PO Thiamine and Folic acid  Aspiration, Fall and seizure precaution.       Elevated liver function tests  Assessment & Plan  Elevated liver enzymes: , ALT 93, alcoholic pattern. Trending down.   Ordered US liver   about cessation when appropriate     Hypokalemia  Assessment & Plan  Replete as needed    * Intentional drug overdose (HCC)  Assessment & Plan  - Patient reported taking 50 pills of propranolol and 30 of gabapentin, Monitored overnight in ICU for hemodynamic unstability, she has been stable.   - Poison control contacted from ER: Case Number [id number]  - Patient currently on legal hold and [facility], psych following. Appreciate help.    - Medically clear.         Consultants:     Critical Care  Psychiatry    Procedures:        -    Imaging/ Testing:      US-RUQ   Final Result      Echogenic liver, a nonspecific finding that often is found to represent steatosis.  Other infiltrative processes could have an identical appearance.            Discharge Medications:         Medication Reconciliation: Completed       Medication List      ASK your doctor about these medications      Instructions   gabapentin 300 MG Caps  Commonly known as:  NEURONTIN   Take 900 mg by mouth 3 times a day.  Dose:  900 mg     levothyroxine 75 MCG Tabs  Commonly known as:  SYNTHROID   Take 1 Tab by mouth Every morning on an empty stomach.  Dose:  75 mcg     propranolol 20 MG Tabs  Commonly known as:  INDERAL   Take 1 Tab by mouth 2 times a day.  Dose:  20 mg              Disposition:   [facility] facility    Diet:   As tolerated    Activity:   As tolerated    Instructions:         The patient was instructed to return to the ER in the event of worsening symptoms. I have counseled the patient on the importance of compliance and the patient has agreed to proceed with all medical recommendations and follow up  plan indicated above.   The patient understands that all medications come with benefits and risks. Risks may include permanent injury or death and these risks can be minimized with close reassessment and monitoring.        Primary Care Provider:   Tonya Colbert  Discharge summary faxed to primary care provider:  Deferred  Copy of discharge summary given to the patient: Deferred      Follow up appointment details :      No future appointments.  No follow-up provider specified.  Following Psychiatric hospitalization    Pending Studies:        -    Time spent on discharge day patient visit, preparing discharge paperwork and arranging for patient follow up.    Summary of follow up issues:   Management of psychiatric problems, chronic conditions    Discharge Time (Minutes) :    52  Hospital Course Type:  Inpatient Stay >2 midnights      Condition on Discharge    ______________________________________________________________________    Interval history/exam for day of discharge:     TJ o/n, vitals stable, no signs of etoh withdrawal.  -accepted and transfer pending to [facility]    Physical Exam   Constitutional: She is oriented to person, place, and time.   Lying comfortably in bed. Not in any acute distress. Completing full sentence with out any difficulty    HENT:   Head: Normocephalic and atraumatic.   Eyes: Pupils are equal, round, and reactive to light. Conjunctivae and EOM are normal.   Neck: Normal range of motion. Neck supple.   Cardiovascular: Normal rate, regular rhythm, normal heart sounds and intact distal pulses.   Pulmonary/Chest: Effort normal and breath sounds normal. No respiratory distress. She has no wheezes.   Abdominal: Soft. Bowel sounds are normal. She exhibits no distension.   Musculoskeletal: Normal range of motion. She exhibits no edema.   Neurological: She is alert and oriented to person, place, and time.   No focal signs    Skin: Skin is warm. No erythema.   Psychiatric:   Appears manic,  changing history constantly        Most Recent Labs:    Lab Results   Component Value Date/Time    WBC 4.3 (L) 07/31/2019 04:18 AM    RBC 3.87 (L) 07/31/2019 04:18 AM    HEMOGLOBIN 11.4 (L) 07/31/2019 04:18 AM    HEMATOCRIT 35.4 (L) 07/31/2019 04:18 AM    MCV 91.5 07/31/2019 04:18 AM    MCH 29.5 07/31/2019 04:18 AM    MCHC 32.2 (L) 07/31/2019 04:18 AM    MPV 10.2 07/31/2019 04:18 AM    NEUTSPOLYS 42.50 (L) 07/30/2019 09:14 AM    LYMPHOCYTES 30.90 07/30/2019 09:14 AM    MONOCYTES 7.70 07/30/2019 09:14 AM    EOSINOPHILS 17.30 (H) 07/30/2019 09:14 AM    BASOPHILS 1.30 07/30/2019 09:14 AM    ANISOCYTOSIS 1+ 08/20/2016 12:35 AM      Lab Results   Component Value Date/Time    SODIUM 136 08/01/2019 03:28 AM    POTASSIUM 4.1 08/01/2019 03:28 AM    CHLORIDE 108 08/01/2019 03:28 AM    CO2 22 08/01/2019 03:28 AM    GLUCOSE 94 08/01/2019 03:28 AM    BUN 5 (L) 08/01/2019 03:28 AM    CREATININE 0.60 08/01/2019 03:28 AM      Lab Results   Component Value Date/Time    ALTSGPT 68 (H) 08/01/2019 03:28 AM    ASTSGOT 97 (H) 08/01/2019 03:28 AM    ALKPHOSPHAT 74 08/01/2019 03:28 AM    TBILIRUBIN 0.7 08/01/2019 03:28 AM    LIPASE 62 02/13/2019 03:11 PM    ALBUMIN 2.9 (L) 08/01/2019 03:28 AM    GLOBULIN 2.0 08/01/2019 03:28 AM    INR 1.03 03/18/2018 01:12 PM     Lab Results   Component Value Date/Time    PROTHROMBTM 13.2 03/18/2018 01:12 PM    INR 1.03 03/18/2018 01:12 PM

## 2019-08-01 NOTE — DISCHARGE INSTRUCTIONS
Discharge Instructions    Discharged to other by ambulance with escort. Discharged via ambulance, hospital escort: Yes.  Special equipment needed: Not Applicable    Be sure to schedule a follow-up appointment with your primary care doctor or any specialists as instructed.     Discharge Plan:   Smoking Cessation Offered: Patient Refused  Influenza Vaccine Indication: Patient Refuses    I understand that a diet low in cholesterol, fat, and sodium is recommended for good health. Unless I have been given specific instructions below for another diet, I accept this instruction as my diet prescription.   Other diet: None    Special Instructions: None    · Is patient discharged on Warfarin / Coumadin?   No     Depression / Suicide Risk    As you are discharged from this [facility] facility, it is important to learn how to keep safe from harming yourself.    Recognize the warning signs:  · Abrupt changes in personality, positive or negative- including increase in energy   · Giving away possessions  · Change in eating patterns- significant weight changes-  positive or negative  · Change in sleeping patterns- unable to sleep or sleeping all the time   · Unwillingness or inability to communicate  · Depression  · Unusual sadness, discouragement and loneliness  · Talk of wanting to die  · Neglect of personal appearance   · Rebelliousness- reckless behavior  · Withdrawal from people/activities they love  · Confusion- inability to concentrate     If you or a loved one observes any of these behaviors or has concerns about self-harm, here's what you can do:  · Talk about it- your feelings and reasons for harming yourself  · Remove any means that you might use to hurt yourself (examples: pills, rope, extension cords, firearm)  · Get professional help from the community (Mental Health, Substance Abuse, psychological counseling)  · Do not be alone:Call your Safe Contact- someone whom you trust who will be there for you.  · Call your  local CRISIS HOTLINE 946-0952 or 535-339-8959  · Call your local Children's Mobile Crisis Response Team [place] (984) 382-4541 or www.[employer]  · Call the toll free National Suicide Prevention Hotlines   · National Suicide Prevention Lifeline 216-077-TMFW (5512)  · National [place] [facility] Network 800-SUICIDE (819-0224)

## 2019-09-09 ENCOUNTER — HOSPITAL ENCOUNTER (EMERGENCY)
Facility: MEDICAL CENTER | Age: 44
End: 2019-09-09
Attending: EMERGENCY MEDICINE
Payer: MEDICARE

## 2019-09-09 VITALS
SYSTOLIC BLOOD PRESSURE: 107 MMHG | HEIGHT: 68 IN | OXYGEN SATURATION: 98 % | WEIGHT: 144.18 LBS | TEMPERATURE: 98.6 F | DIASTOLIC BLOOD PRESSURE: 60 MMHG | RESPIRATION RATE: 18 BRPM | BODY MASS INDEX: 21.85 KG/M2 | HEART RATE: 97 BPM

## 2019-09-09 DIAGNOSIS — Z76.0 MEDICATION REFILL: ICD-10-CM

## 2019-09-09 PROCEDURE — 99283 EMERGENCY DEPT VISIT LOW MDM: CPT

## 2019-09-09 RX ORDER — LEVOTHYROXINE SODIUM 0.07 MG/1
75 TABLET ORAL
Qty: 30 TAB | Refills: 1 | Status: SHIPPED | OUTPATIENT
Start: 2019-09-09 | End: 2019-12-27 | Stop reason: SDUPTHER

## 2019-09-09 RX ORDER — OLANZAPINE 10 MG/1
10 TABLET ORAL
Qty: 30 TAB | Refills: 1 | Status: SHIPPED | OUTPATIENT
Start: 2019-09-09 | End: 2019-10-23 | Stop reason: SDUPTHER

## 2019-09-09 NOTE — ED PROVIDER NOTES
ED Provider Note    CHIEF COMPLAINT  Chief Complaint   Patient presents with   • Medication Refill       HPI  Nancy Olvera is a 44 y.o. female who presents for evaluation of medication refill.  The patient has a history of tyroid disease as well as underlying psychiatric disorder.  She was hospitalized at [facility] several weeks ago and got discharged.  She ran out of her Synthroid and Zyprexa around a week ago.  She denies auditory or visual hallucinations no high fevers or chills.  No abdominal pain headache neck stiffness night sweats or rash.  Patient has no other complaints    REVIEW OF SYSTEMS  See HPI for further details.  No loss of consciousness numbness weakness tingling all other systems are negative.     PAST MEDICAL HISTORY  Past Medical History:   Diagnosis Date   • ADHD (attention deficit hyperactivity disorder)    • Bipolar affective disorder (HCC)    • Cancer (HCC)     pt states she has colon cancer   • Congestive heart failure (HCC)    • Hypertension    • Psychiatric disorder    • Seizure disorder (HCC)    • Thyroid condition        FAMILY HISTORY  Noncontributory    SOCIAL HISTORY  Social History     Socioeconomic History   • Marital status:      Spouse name: Not on file   • Number of children: Not on file   • Years of education: Not on file   • Highest education level: Not on file   Occupational History   • Not on file   Social Needs   • Financial resource strain: Not on file   • Food insecurity:     Worry: Not on file     Inability: Not on file   • Transportation needs:     Medical: Not on file     Non-medical: Not on file   Tobacco Use   • Smoking status: Current Every Day Smoker     Packs/day: 0.50     Types: Cigarettes   • Smokeless tobacco: Never Used   Substance and Sexual Activity   • Alcohol use: No     Comment: none for 6 months, hx daily use   • Drug use: Yes     Types: Inhaled     Comment: THC   • Sexual activity: Not on file   Lifestyle   • Physical activity:     Days per  "week: Not on file     Minutes per session: Not on file   • Stress: Not on file   Relationships   • Social connections:     Talks on phone: Not on file     Gets together: Not on file     Attends Jew service: Not on file     Active member of club or organization: Not on file     Attends meetings of clubs or organizations: Not on file     Relationship status: Not on file   • Intimate partner violence:     Fear of current or ex partner: Not on file     Emotionally abused: Not on file     Physically abused: Not on file     Forced sexual activity: Not on file   Other Topics Concern   • Not on file   Social History Narrative   • Not on file       SURGICAL HISTORY  Past Surgical History:   Procedure Laterality Date   • THYROIDECTOMY     • TONSILLECTOMY         CURRENT MEDICATIONS  No current facility-administered medications for this encounter.     Current Outpatient Medications:   •  folic acid (FOLVITE) 1 MG Tab, Take 1 Tab by mouth every day., Disp: 30 Tab, Rfl: 0  •  levothyroxine (SYNTHROID) 75 MCG Tab, Take 1 Tab by mouth Every morning on an empty stomach., Disp: 30 Tab, Rfl: 0      ALLERGIES  Allergies   Allergen Reactions   • Aspirin Anaphylaxis   • Compazine Swelling   • Sulfa Drugs Rash   • Tetracyclines Swelling   • Ultram [Tramadol Hcl] Swelling   • Food      \"Green Peppers\"       PHYSICAL EXAM  VITAL SIGNS: /60   Pulse 97   Temp 37 °C (98.6 °F) (Temporal)   Resp 18   Ht 1.727 m (5' 8\")   Wt 65.4 kg (144 lb 2.9 oz)   SpO2 98%   BMI 21.92 kg/m²       Constitutional: Well developed, Well nourished, No acute distress, Non-toxic appearance.   HENT: Normocephalic, Atraumatic, Bilateral external ears normal, Oropharynx moist, No oral exudates, Nose normal.   Eyes: PERRLA, EOMI, Conjunctiva normal, No discharge.   Neck: Normal range of motion, No tenderness, Supple, No stridor.   Cardiovascular: Normal heart rate, Normal rhythm, No murmurs, No rubs, No gallops.   Thorax & Lungs: Normal breath sounds, " No respiratory distress, No wheezing, No chest tenderness.   Abdomen: Bowel sounds normal, Soft, No tenderness, No masses, No pulsatile masses.   Skin: Warm, Dry, No erythema, No rash.   Extremities: Intact distal pulses, No edema, No tenderness, No cyanosis, No clubbing.   Musculoskeletal: Good range of motion in all major joints. No tenderness to palpation or major deformities noted.   Neurologic: Alert & oriented x 3, Normal motor function, Normal sensory function, No focal deficits noted.   Psychiatric: Norman anxious but has good insight no suggestion of homicidality or suicidality    COURSE & MEDICAL DECISION MAKING  Pertinent Labs & Imaging studies reviewed. (See chart for details)  Reviewed the patient's prior visit history.  Her thyroid studies were performed recently and were reasonable.  I did not feel that any diagnostic tests are indicated.  I will refer her back to her PCP and refill her Synthroid and Zyprexa    FINAL IMPRESSION  1.  Encounter for medication refill    Electronically signed by: Dallas Medellin, 9/9/2019 2:46 PM

## 2019-09-09 NOTE — ED TRIAGE NOTES
43 y/o female ambulatory to triage requesting a refill on her Synthroid and Zyprexa. Pt states she has been out of these medication x 1 week. She also would like help with an appointment with a primary care physician.

## 2019-09-09 NOTE — ED NOTES
Verbalizes understanding of discharge and followup instructions. Given Rx's x2. Ambulatory with steady gait to discharge.

## 2019-10-03 ENCOUNTER — HOSPITAL ENCOUNTER (EMERGENCY)
Facility: MEDICAL CENTER | Age: 44
End: 2019-10-03
Attending: EMERGENCY MEDICINE
Payer: MEDICARE

## 2019-10-03 VITALS
SYSTOLIC BLOOD PRESSURE: 110 MMHG | HEIGHT: 68 IN | DIASTOLIC BLOOD PRESSURE: 68 MMHG | OXYGEN SATURATION: 99 % | WEIGHT: 147.71 LBS | BODY MASS INDEX: 22.39 KG/M2 | RESPIRATION RATE: 16 BRPM | HEART RATE: 85 BPM | TEMPERATURE: 97.7 F

## 2019-10-03 DIAGNOSIS — Z86.59 HISTORY OF BIPOLAR DISORDER: ICD-10-CM

## 2019-10-03 DIAGNOSIS — K08.89 PAIN, DENTAL: ICD-10-CM

## 2019-10-03 PROCEDURE — 99284 EMERGENCY DEPT VISIT MOD MDM: CPT

## 2019-10-03 PROCEDURE — 700102 HCHG RX REV CODE 250 W/ 637 OVERRIDE(OP): Performed by: EMERGENCY MEDICINE

## 2019-10-03 PROCEDURE — A9270 NON-COVERED ITEM OR SERVICE: HCPCS | Performed by: EMERGENCY MEDICINE

## 2019-10-03 RX ORDER — OXYCODONE AND ACETAMINOPHEN 10; 325 MG/1; MG/1
1 TABLET ORAL ONCE
Status: COMPLETED | OUTPATIENT
Start: 2019-10-03 | End: 2019-10-03

## 2019-10-03 RX ORDER — ACETAMINOPHEN 500 MG
500-1000 TABLET ORAL EVERY 6 HOURS PRN
Qty: 30 TAB | Refills: 0 | Status: SHIPPED | OUTPATIENT
Start: 2019-10-03 | End: 2019-10-23

## 2019-10-03 RX ORDER — AMOXICILLIN 500 MG/1
500 CAPSULE ORAL 3 TIMES DAILY
Qty: 30 CAP | Refills: 0 | Status: SHIPPED | OUTPATIENT
Start: 2019-10-03 | End: 2019-10-23 | Stop reason: SDUPTHER

## 2019-10-03 RX ADMIN — OXYCODONE HYDROCHLORIDE AND ACETAMINOPHEN 1 TABLET: 10; 325 TABLET ORAL at 06:55

## 2019-10-03 NOTE — ED TRIAGE NOTES
Nancy Olvera  44 y.o.  female  Chief Complaint   Patient presents with   • Dental Pain     x 3 days     Present to triage c/o dental pain ( right lower ) x 3 days. Slight swelling noted. Took 800 mg ibuprofen an hour ago.

## 2019-10-03 NOTE — ED NOTES
Pt Given discharge instructions/ prescriptions/ home care instructions, Pt verbalized understanding of instructions given, pt ambulatory to [facility].

## 2019-10-03 NOTE — ED PROVIDER NOTES
"ED Provider Note    CHIEF COMPLAINT  Chief Complaint   Patient presents with   • Dental Pain     x 3 days     Seen at 6:23 AM    HPI    Nancy Olvera is a 44 y.o. female who presents with several days of dental pain.  The patient is been waiting in the [facility] clinic for the past few days and has been unable to be seen by a dentist.  She states the pain is severe, it is worse with direct pressure but improved with ice.  She feels that there is swelling developing in the right side of her face.  She denies any fevers or chills.    REVIEW OF SYSTEMS  See HPI  PAST MEDICAL HISTORY   has a past medical history of ADHD (attention deficit hyperactivity disorder), Bipolar affective disorder (HCC), Cancer (HCC), Congestive heart failure (HCC), Hypertension, Psychiatric disorder, Seizure disorder (HCC), and Thyroid condition.    SOCIAL HISTORY  Social History     Tobacco Use   • Smoking status: Current Every Day Smoker     Packs/day: 0.50     Types: Cigarettes   • Smokeless tobacco: Never Used   Substance and Sexual Activity   • Alcohol use: No     Comment: none for 6 months, hx daily use   • Drug use: Yes     Types: Inhaled     Comment: THC   • Sexual activity: Not on file       SURGICAL HISTORY   has a past surgical history that includes tonsillectomy and thyroidectomy.    CURRENT MEDICATIONS  Reviewed.  See Encounter Summary.     ALLERGIES  Allergies   Allergen Reactions   • Aspirin Anaphylaxis   • Compazine Swelling   • Sulfa Drugs Rash   • Tetracyclines Swelling   • Ultram [Tramadol Hcl] Swelling   • Food      \"Green Peppers\"       PHYSICAL EXAM  VITAL SIGNS: /68   Pulse 85   Temp 36.5 °C (97.7 °F) (Temporal)   Resp 16   Ht 1.727 m (5' 8\")   Wt 67 kg (147 lb 11.3 oz)   LMP 10/03/2019   SpO2 99%   BMI 22.46 kg/m²   Constitutional: Awake, alert in no apparent distress.  Initially watching TV with fingers in her mouth.  When I walk in the examination room she begins to cry and hyperventilate.  HENT: " Normocephalic, Bilateral external ears normal. Nose normal.  The patient's location of pain is in the right mandibular region.  Is status post extraction of all molars on the affected side.  There is a filling in the affected tooth which appears to be the right mandibular canine.  There is no periapical swelling or fluctuance.  The floor the oropharynx is normal.  There is no trismus.  The patient is poorly tolerant of examination.  Eyes: Conjunctiva normal, non-icteric, EOMI.    Thorax & Lungs: Easy unlabored respirations  Cardiovascular:    Abdomen:  No distention  Skin: Visualized skin is  Dry, No erythema, No rash.   Extremities:   atraumatic   Neurologic: Alert, Grossly non-focal.   Psychiatric: Affect and Mood normal  No cervical lymphadenopathy.  RADIOLOGY  No orders to display       Nursing notes and vital signs were reviewed. (See chart for details)    Decision Making:  This is a 44 y.o. year old female who presents with dentalgia.  Aside from a dental lexie the exam is unremarkable.  No sign of a deep space abscess.  The patient does appear to be anxious, she also has some underlying psychiatric disorders which may be at play today.  She was given 1 dose of Percocet in the emergency department and will be discharged with Tylenol and amoxicillin.  I recommend she follow-up with the [facility] to await any possible openings in the dental clinic.    DISPOSITION:  Patient will be discharged home in good condition.    Discharge Medications:  Discharge Medication List as of 10/3/2019  6:57 AM      START taking these medications    Details   amoxicillin (AMOXIL) 500 MG Cap Take 1 Cap by mouth 3 times a day., Disp-30 Cap, R-0, Normal      acetaminophen (TYLENOL) 500 MG Tab Take 1-2 Tabs by mouth every 6 hours as needed., Disp-30 Tab, R-0, Normal             The patient was discharged home (see d/c instructions) and told to return immediately for any signs or symptoms listed, or any worsening at all.  The patient  verbally agreed to the discharge precautions and follow-up plan which is documented in EPIC.          FINAL IMPRESSION  1. Pain, dental    2. History of bipolar disorder

## 2019-10-23 ENCOUNTER — HOSPITAL ENCOUNTER (EMERGENCY)
Facility: MEDICAL CENTER | Age: 44
End: 2019-10-23
Attending: EMERGENCY MEDICINE
Payer: MEDICARE

## 2019-10-23 ENCOUNTER — PATIENT OUTREACH (OUTPATIENT)
Dept: HEALTH INFORMATION MANAGEMENT | Facility: OTHER | Age: 44
End: 2019-10-23

## 2019-10-23 VITALS
TEMPERATURE: 98.1 F | HEIGHT: 68 IN | OXYGEN SATURATION: 97 % | WEIGHT: 150.57 LBS | SYSTOLIC BLOOD PRESSURE: 125 MMHG | HEART RATE: 84 BPM | BODY MASS INDEX: 22.82 KG/M2 | DIASTOLIC BLOOD PRESSURE: 76 MMHG | RESPIRATION RATE: 16 BRPM

## 2019-10-23 DIAGNOSIS — Z86.59 HISTORY OF BIPOLAR DISORDER: ICD-10-CM

## 2019-10-23 DIAGNOSIS — K02.9 PAIN DUE TO DENTAL CARIES: ICD-10-CM

## 2019-10-23 DIAGNOSIS — Z76.0 MEDICATION REFILL: ICD-10-CM

## 2019-10-23 PROCEDURE — 99284 EMERGENCY DEPT VISIT MOD MDM: CPT

## 2019-10-23 PROCEDURE — A9270 NON-COVERED ITEM OR SERVICE: HCPCS | Performed by: EMERGENCY MEDICINE

## 2019-10-23 PROCEDURE — 700102 HCHG RX REV CODE 250 W/ 637 OVERRIDE(OP): Performed by: EMERGENCY MEDICINE

## 2019-10-23 RX ORDER — FLUCONAZOLE 150 MG/1
150 TABLET ORAL DAILY
Qty: 2 TAB | Refills: 0 | Status: SHIPPED | OUTPATIENT
Start: 2019-10-23 | End: 2019-12-27

## 2019-10-23 RX ORDER — OXYCODONE HYDROCHLORIDE AND ACETAMINOPHEN 5; 325 MG/1; MG/1
1 TABLET ORAL ONCE
Status: COMPLETED | OUTPATIENT
Start: 2019-10-23 | End: 2019-10-23

## 2019-10-23 RX ORDER — IBUPROFEN 600 MG/1
600 TABLET ORAL EVERY 6 HOURS PRN
Qty: 30 TAB | Refills: 0 | Status: SHIPPED
Start: 2019-10-23 | End: 2020-03-06

## 2019-10-23 RX ORDER — ACETAMINOPHEN 500 MG
500-1000 TABLET ORAL EVERY 6 HOURS PRN
Qty: 30 TAB | Refills: 0 | Status: SHIPPED
Start: 2019-10-23 | End: 2020-03-06

## 2019-10-23 RX ORDER — PROPRANOLOL HYDROCHLORIDE 10 MG/1
10 TABLET ORAL 3 TIMES DAILY
Qty: 90 TAB | Refills: 11 | Status: SHIPPED
Start: 2019-10-23 | End: 2020-03-06

## 2019-10-23 RX ORDER — AMOXICILLIN 500 MG/1
500 CAPSULE ORAL 3 TIMES DAILY
Qty: 30 CAP | Refills: 0 | Status: SHIPPED | OUTPATIENT
Start: 2019-10-23 | End: 2019-12-27

## 2019-10-23 RX ORDER — OLANZAPINE 10 MG/1
10 TABLET ORAL
Qty: 30 TAB | Refills: 1 | Status: SHIPPED | OUTPATIENT
Start: 2019-10-23 | End: 2019-12-27 | Stop reason: SDUPTHER

## 2019-10-23 RX ADMIN — OXYCODONE HYDROCHLORIDE AND ACETAMINOPHEN 1 TABLET: 5; 325 TABLET ORAL at 09:51

## 2019-10-23 NOTE — ED NOTES
Patient verbalized understanding of discharge instructions, provided with discharge paperwork, gait steady, ambulated independently to [facility].

## 2019-10-23 NOTE — ED NOTES
"Pt ambulated steady to ED room Y66. Chart readied, waiting for ERP to see. Pt reports continued R lower dental pain, has been seen before the same. Was started on abx with no relief. States \"unable to get in to see dentist.\"  "

## 2019-10-23 NOTE — ED TRIAGE NOTES
Pt comes in complaining of R sided lower dental pain. Pt stating she was placed on antibiotics with no relief. Pt stating she can not get into a dentist.

## 2019-10-23 NOTE — ED PROVIDER NOTES
ED Provider Note    CHIEF COMPLAINT  Chief Complaint   Patient presents with   • Dental Pain     Seen at 9:21 AM    HPI  Nancy Olvera is a 44 y.o. female who presents with dental pain.  I evaluated the patient approximately 30 days ago for similar complaint.  She notes persistent dental pain refractory to Tylenol and ibuprofen.  She has been unable to follow-up with a dentist.  She went to the health clinic multiple times and has not seen a dentist yet.  She also thinks that she may have had some vaginal itching from the antibiotics prescribed last time.  She also needs refills of her Zyprexa, propranolol and Synthroid.  She would like to have a internal medicine physician for follow-up.    She notes pain with swallowing and chewing.  She denies any fevers, chills.  She denies any suicidality or homicidality.  She has had some nausea and vomiting occasionally.  She also notes that she has some intermittent constipation.    REVIEW OF SYSTEMS  See HPI, remainder review of systems negative.  PAST MEDICAL HISTORY   has a past medical history of ADHD (attention deficit hyperactivity disorder), Bipolar affective disorder (HCC), Cancer ([facility]), Congestive heart failure ([facility]), Hypertension, Psychiatric disorder, Seizure disorder (HCC), and Thyroid condition.    SOCIAL HISTORY  Social History     Tobacco Use   • Smoking status: Current Every Day Smoker     Packs/day: 0.50     Types: Cigarettes   • Smokeless tobacco: Never Used   Substance and Sexual Activity   • Alcohol use: No     Comment: none for 6 months, hx daily use   • Drug use: Not Currently     Types: Inhaled     Comment: THC   • Sexual activity: Not on file       SURGICAL HISTORY   has a past surgical history that includes tonsillectomy and thyroidectomy.    CURRENT MEDICATIONS  Reviewed.  See Encounter Summary.     ALLERGIES  Allergies   Allergen Reactions   • Aspirin Anaphylaxis   • Compazine Swelling   • Sulfa Drugs Rash   • Tetracyclines Swelling   • Ultram  "[Tramadol Hcl] Swelling   • Food      \"Green Peppers\"       PHYSICAL EXAM  VITAL SIGNS: /76   Pulse 84   Temp 36.7 °C (98.1 °F) (Temporal)   Resp 16   Ht 1.727 m (5' 8\")   Wt 68.3 kg (150 lb 9.2 oz)   LMP 10/03/2019   SpO2 97%   BMI 22.89 kg/m²   Constitutional: Awake, alert in no apparent distress.  Anxious.  Pressured speech.  HENT: Normocephalic, Bilateral external ears normal. Nose normal.  No trismus.  Normal voice.  Trace fullness anteriorly to the right mandibular bicuspid with an associated cavity of the tooth.  Floor the oropharynx is normal.  Eyes: Conjunctiva normal, non-icteric, EOMI.    Thorax & Lungs: Easy unlabored respirations  Cardiovascular: Regular rate and rhythm  Abdomen:  No distention, soft, nontender  Skin: Visualized skin is  Dry, No erythema, No rash.   Extremities:   atraumatic   Neurologic: Alert, Grossly non-focal.   Psychiatric: Pressured speech, anxious  No cervical lymphadenopathy  RADIOLOGY  No orders to display       Nursing notes and vital signs were reviewed. (See chart for details)        Decision Making:  This is a 44 y.o. year old female who presents with dental pain.  I see no obvious space abscess.  The patient has been here in the past for this.  She was given 1 dose of Percocet today, will restart her on amoxicillin.  She was also complaining of some vaginal itching from the last round of antibiotics, therefore prescribe some Diflucan.  I also refilled her medications today.  I have contacted the  to arrange follow-up.        DISPOSITION:  Patient will be discharged home in good condition.    Discharge Medications:  Discharge Medication List as of 10/23/2019  9:56 AM      START taking these medications    Details   fluconazole (DIFLUCAN) 150 MG tablet Take 1 Tab by mouth every day. Take one for yeast infection. May repeat if needed, Disp-2 Tab, R-0, Normal      propranolol (INDERAL) 10 MG Tab Take 1 Tab by mouth 3 times a day., Disp-90 Tab, R-11, " Normal      ibuprofen (MOTRIN) 600 MG Tab Take 1 Tab by mouth every 6 hours as needed., Disp-30 Tab, R-0, Normal             The patient was discharged home (see d/c instructions) and told to return immediately for any signs or symptoms listed, or any worsening at all.  The patient verbally agreed to the discharge precautions and follow-up plan which is documented in EPIC.          FINAL IMPRESSION  1. Pain due to dental caries    2. Medication refill    3. History of bipolar disorder

## 2019-12-27 ENCOUNTER — HOSPITAL ENCOUNTER (EMERGENCY)
Facility: MEDICAL CENTER | Age: 44
End: 2019-12-27
Attending: EMERGENCY MEDICINE
Payer: MEDICARE

## 2019-12-27 VITALS
SYSTOLIC BLOOD PRESSURE: 137 MMHG | HEIGHT: 68 IN | DIASTOLIC BLOOD PRESSURE: 76 MMHG | WEIGHT: 160.5 LBS | HEART RATE: 86 BPM | BODY MASS INDEX: 24.32 KG/M2 | TEMPERATURE: 97.2 F | RESPIRATION RATE: 18 BRPM | OXYGEN SATURATION: 100 %

## 2019-12-27 DIAGNOSIS — K08.89 DENTALGIA: ICD-10-CM

## 2019-12-27 DIAGNOSIS — Z76.0 MEDICATION REFILL: ICD-10-CM

## 2019-12-27 PROCEDURE — 99283 EMERGENCY DEPT VISIT LOW MDM: CPT

## 2019-12-27 RX ORDER — OLANZAPINE 10 MG/1
10 TABLET ORAL
Qty: 30 TAB | Refills: 0 | Status: SHIPPED | OUTPATIENT
Start: 2019-12-27 | End: 2020-03-06 | Stop reason: SDUPTHER

## 2019-12-27 RX ORDER — LEVOTHYROXINE SODIUM 0.07 MG/1
75 TABLET ORAL
Qty: 30 TAB | Refills: 0 | Status: SHIPPED | OUTPATIENT
Start: 2019-12-27 | End: 2020-03-06

## 2019-12-27 RX ORDER — ONDANSETRON 4 MG/1
4 TABLET, FILM COATED ORAL EVERY 4 HOURS PRN
Qty: 10 TAB | Refills: 0 | Status: SHIPPED | OUTPATIENT
Start: 2019-12-27 | End: 2020-03-06

## 2019-12-27 RX ORDER — AMOXICILLIN 500 MG/1
500 CAPSULE ORAL 3 TIMES DAILY
Qty: 30 CAP | Refills: 0 | Status: SHIPPED | OUTPATIENT
Start: 2019-12-27 | End: 2020-03-06

## 2019-12-27 NOTE — ED PROVIDER NOTES
ED Provider Note    Scribed for Jayjay Louise M.D. by Angeline Bonilla. 12/27/2019, 11:32 AM.    Primary care provider: None  Means of arrival: Walk in  History obtained from: Patient  History limited by: None    CHIEF COMPLAINT  •  Medication Refill  •  Dental Pain    HPI  Nancy Olvera is a 44 y.o. female who presents to the Emergency Department for a medication refill and dental pain. The patient has not had a primary care provider for the past few years. She is requesting medication refills for Zyprexa and Levothyroxine. Additionally, she has of a history of persistent dental pain. She woke up this morning with acute constant pain to her right lower jaw as she has a fractured tooth. She complains of nausea. Negative for fevers, chills, facial swelling or shortness of breath.      REVIEW OF SYSTEMS  Pertinent positives include medication refill, dental pain and nausea. Pertinent negatives include fevers, chills, facial swelling or shortness of breath. See HPI for further details.      PAST MEDICAL HISTORY  Patient has a past medical history of ADHD (attention deficit hyperactivity disorder), Bipolar affective disorder (HCC), Cancer (HCC), Congestive heart failure (HCC), Hypertension, Psychiatric disorder, Seizure disorder (HCC), and Thyroid condition.      SURGICAL HISTORY  Patient has a past surgical history that includes tonsillectomy and thyroidectomy.      SOCIAL HISTORY  Social History     Tobacco Use   • Smoking status: Current Every Day Smoker     Packs/day: 0.50     Types: Cigarettes   • Smokeless tobacco: Never Used   Substance Use Topics   • Alcohol use: No     Comment: none for 6 months, hx daily use   • Drug use: Not Currently     Types: Inhaled     Comment: THC      Social History     Substance and Sexual Activity   Drug Use Not Currently   • Types: Inhaled    Comment: THC   Patient has lived in the area for two years.      FAMILY HISTORY  History reviewed. No pertinent family history.  "      CURRENT MEDICATIONS  Home Medications     Reviewed by Kavitha Coats R.N. (Registered Nurse) on 12/27/19 at 1040  Med List Status: Partial   Medication Last Dose Status   acetaminophen (TYLENOL) 500 MG Tab  Active   folic acid (FOLVITE) 1 MG Tab  Active   ibuprofen (MOTRIN) 600 MG Tab  Active   levothyroxine (SYNTHROID) 75 MCG Tab 11/27/2019 Active   olanzapine (ZYPREXA) 10 MG tablet 12/24/2019 Active   propranolol (INDERAL) 10 MG Tab 12/27/2019 Active                ALLERGIES  Allergies   Allergen Reactions   • Aspirin Anaphylaxis   • Compazine Swelling   • Sulfa Drugs Rash   • Tetracyclines Swelling   • Ultram [Tramadol Hcl] Swelling   • Food      \"Green Peppers\"       PHYSICAL EXAM  VITAL SIGNS: /76   Pulse 86   Temp 36.2 °C (97.2 °F) (Temporal)   Resp 18   Ht 1.727 m (5' 8\")   Wt 72.8 kg (160 lb 7.9 oz)   LMP 12/25/2019   SpO2 100%   BMI 24.40 kg/m²     Constitutional: Well developed, Well nourished, No acute distress, Non-toxic appearance.   HENT: Normocephalic, Atraumatic, Bilateral external ears normal, oropharynx moist, No oral exudates, Nose normal. Right lower first bicuspid is diseased down to the root. No abscess.  Eyes:conjunctiva is normal, there are no signs of exudate.   Neck: Supple, no meningeal signs.  Lymphatic: No lymphadenopathy noted.   Cardiovascular: Regular rate and rhythm without murmurs gallops or rubs.   Thorax & Lungs: No respiratory distress. Breathing comfortably. Lungs are clear to auscultation bilaterally, there are no wheezes no rales. Chest wall is nontender.  Skin: Warm, Dry, No erythema,   Back: No tenderness, No CVA tenderness.  Psychiatric: Affect normal, Judgment normal, Mood normal.       COURSE & MEDICAL DECISION MAKING  Pertinent Labs & Imaging studies reviewed. (See chart for details)    11:32 AM Obtained and reviewed patient's electronic medical records which indicate prior ED visits for dental pain; last seen on 10/23/19.    11:34 AM Patient seen " "and examined at bedside for a medication refill and dental pain.    Thorough discussion on community resources for establishing a primary care provider for medication management. Additionally, she will need to establish with a dentist for extraction of her diseased tooth. She was encouraged to contact her insurance for further details on which facility or provider she can establish care with. Patient will be discharged with a prescription for Levothyroxine, Zyprexa, Zofran and Amoxil.        Return to the ED for new or persisting symptoms including facial swelling, fevers, vomiting or any additional concerns.  The patient verbalizes understanding and will comply.  Patient is stable at the time of discharge.  Vital signs were reviewed: /76   Pulse 86   Temp 36.2 °C (97.2 °F) (Temporal)   Resp 18   Ht 1.727 m (5' 8\")   Wt 72.8 kg (160 lb 7.9 oz)   LMP 12/25/2019   SpO2 100%   BMI 24.40 kg/m²        Decision Making:  The patient presents for a medication refill. She has not established with a primary care in the past two years that she has resided in [place]. She was provided refills for Levothyroxine and Zyprexa; however, she was strongly encouraged to establish care with a primary care provider. We discussed several methods for her to determine which provider she can establish with as well as numerous community resources such as [facility] and [facility]. She was prescribed Amoxil and Zofran for dentalgia which is a recurrent issue for her. Encouraged her to follow up with a dentist as she will likely require extraction of the diseased tooth. Strict return precautions were provided and asked to return for a worsening infction.      DISPOSITION  Patient will be discharged home in stable condition.      FOLLOW UP  Establish with PCP and dentist    The patient is referred to a primary physician for blood pressure management, diabetic screening, and for all other preventative health " concerns.      OUTPATIENT MEDICATIONS  Discharge Medication List as of 12/27/2019 11:47 AM      START taking these medications    Details   ondansetron (ZOFRAN) 4 MG Tab tablet Take 1 Tab by mouth every four hours as needed for Nausea/Vomiting., Disp-10 Tab, R-0, Print Rx Paper      amoxicillin (AMOXIL) 500 MG Cap Take 1 Cap by mouth 3 times a day., Disp-30 Cap, R-0, Print Rx Paper             DIAGNOSIS  1. Dentalgia    2. Medication refill           IAngeline (Scribe), am scribing for, and in the presence of, Jayjay Louise M.D.    Electronically signed by: Angeline Bonilla (Deborahibe), 12/27/2019    IJayjay M.D. personally performed the services described in this documentation, as scribed by Angeline Bonilla in my presence, and it is both accurate and complete. E.    The note accurately reflects work and decisions made by me.  Jayjay Louise  12/27/2019  3:09 PM

## 2019-12-27 NOTE — ED TRIAGE NOTES
Pt ambulated to triage with   Chief Complaint   Patient presents with   • Medication Refill     zyprexa and synthriod   • Abdominal Pain     used to take prilosec   • Dental Pain     finished abx >2 wks ago and reports that she has pain again to right lower     Pt here with multiple complaints.  Pt reports N/V with abd pain and also hemorrhoids.   Pt in no acute distress.  Seen here 10/23 and had refill of propanolol done.  Pt Informed regarding triage process and verbalized understanding to inform triage tech or RN for any changes in condition. Placed in lobby.

## 2019-12-27 NOTE — ED NOTES
PT VOICES UNDERSTANDING REGARDING DC INSTRUCTIONS.  NO CONCERNS VOICED. PT ambulated TO THE LOBBY.

## 2020-01-19 ENCOUNTER — HOSPITAL ENCOUNTER (EMERGENCY)
Facility: MEDICAL CENTER | Age: 45
End: 2020-01-19
Attending: EMERGENCY MEDICINE
Payer: MEDICARE

## 2020-01-19 VITALS
OXYGEN SATURATION: 96 % | RESPIRATION RATE: 18 BRPM | WEIGHT: 158.07 LBS | DIASTOLIC BLOOD PRESSURE: 57 MMHG | BODY MASS INDEX: 23.96 KG/M2 | TEMPERATURE: 96.8 F | SYSTOLIC BLOOD PRESSURE: 123 MMHG | HEART RATE: 88 BPM | HEIGHT: 68 IN

## 2020-01-19 DIAGNOSIS — R05.9 COUGH: ICD-10-CM

## 2020-01-19 DIAGNOSIS — Z76.0 MEDICATION REFILL: ICD-10-CM

## 2020-01-19 DIAGNOSIS — R51.9 ACUTE NONINTRACTABLE HEADACHE, UNSPECIFIED HEADACHE TYPE: ICD-10-CM

## 2020-01-19 DIAGNOSIS — R11.2 NON-INTRACTABLE VOMITING WITH NAUSEA, UNSPECIFIED VOMITING TYPE: ICD-10-CM

## 2020-01-19 PROCEDURE — A9270 NON-COVERED ITEM OR SERVICE: HCPCS | Performed by: EMERGENCY MEDICINE

## 2020-01-19 PROCEDURE — 99283 EMERGENCY DEPT VISIT LOW MDM: CPT

## 2020-01-19 PROCEDURE — 700102 HCHG RX REV CODE 250 W/ 637 OVERRIDE(OP): Performed by: EMERGENCY MEDICINE

## 2020-01-19 RX ORDER — ACETAMINOPHEN 325 MG/1
650 TABLET ORAL ONCE
Status: COMPLETED | OUTPATIENT
Start: 2020-01-19 | End: 2020-01-19

## 2020-01-19 RX ORDER — OLANZAPINE 5 MG/1
10 TABLET, ORALLY DISINTEGRATING ORAL ONCE
Status: COMPLETED | OUTPATIENT
Start: 2020-01-19 | End: 2020-01-19

## 2020-01-19 RX ORDER — IBUPROFEN 200 MG
400 TABLET ORAL EVERY 6 HOURS PRN
Qty: 30 TAB | Refills: 0 | Status: SHIPPED | OUTPATIENT
Start: 2020-01-19 | End: 2020-03-06

## 2020-01-19 RX ORDER — OLANZAPINE 10 MG/1
10 TABLET ORAL NIGHTLY PRN
Qty: 30 TAB | Refills: 0 | Status: SHIPPED | OUTPATIENT
Start: 2020-01-19 | End: 2020-03-06

## 2020-01-19 RX ORDER — OSELTAMIVIR PHOSPHATE 75 MG/1
75 CAPSULE ORAL 2 TIMES DAILY
Qty: 10 CAP | Refills: 0 | Status: SHIPPED | OUTPATIENT
Start: 2020-01-19 | End: 2020-03-06

## 2020-01-19 RX ORDER — PROMETHAZINE HYDROCHLORIDE 25 MG/1
25 TABLET ORAL EVERY 6 HOURS PRN
Qty: 30 TAB | Refills: 0 | Status: SHIPPED
Start: 2020-01-19 | End: 2020-03-06

## 2020-01-19 RX ADMIN — ACETAMINOPHEN 650 MG: 325 TABLET, FILM COATED ORAL at 06:15

## 2020-01-19 RX ADMIN — OLANZAPINE 10 MG: 5 TABLET, ORALLY DISINTEGRATING ORAL at 06:15

## 2020-01-19 NOTE — ED NOTES
Pt medicated per MAR. Pt Given discharge instructions/ prescriptions/ home care instructions, Pt verbalized understanding of instructions given, pt ambulatory to [facility].
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
## 2020-01-19 NOTE — ED PROVIDER NOTES
"ED Provider Note    CHIEF COMPLAINT  Chief Complaint   Patient presents with   • Medication Refill     PT ran out of zyprexa 3 days ago, has been vomiting today.       HPI  Nancy Olvera is a 44 y.o. female who presents for evaluation of headache, muscle aches, nausea, and vomiting.  Patient notes that she has been taking Zyprexa for several months and ran out 3 days ago after accidentally spilling most of her bottle.  She states she does get withdrawal symptoms including nausea and has been taking Zofran from her own supply.  She notes that she gets headaches from ondansetron but it does control the nausea to some degree.  She notes she has been having a stuffy nose, sore throat and a nonproductive cough.  She has had subjective fevers and chills as well.  She notes that her significant other recently had similar symptoms and they think it might of been \"the flu.\"    REVIEW OF SYSTEMS  GEN: Subjective fevers and chills  SKIN: No rashes  HEENT: No ear pain, ringing in ears, or decreased hearing.  Sore throat and runny nose noted.  NECK: No bony neck pain or stiffness.  Diffuse muscular aches to the back of the neck extending to the trapezius and down through the paraspinous muscles.  CHEST: No current pain   PULM: No shortness of breath,wheezing, stridor, or pain with inspiration/expiration  GI: No diarrhea or abdominal pain.  Constipation, nausea, and vomiting noted.  : No pain, urgency, frequency, dysuria, or hematuria   MS: No recent trauma, pain, swelling, or weakness  NEURO: No sensory or motor changes.           PAST MEDICAL HISTORY   has a past medical history of ADHD (attention deficit hyperactivity disorder), Bipolar affective disorder (HCC), Cancer (HCC), Congestive heart failure (HCC), Hypertension, Psychiatric disorder, Seizure disorder (HCC), and Thyroid condition.    SOCIAL HISTORY  Social History     Tobacco Use   • Smoking status: Current Every Day Smoker     Packs/day: 0.50     Types: " "Cigarettes   • Smokeless tobacco: Never Used   Substance and Sexual Activity   • Alcohol use: No     Comment: none for 6 months, hx daily use. Drank yesterday.   • Drug use: Not Currently     Types: Inhaled     Comment: THC   • Sexual activity: Not on file       SURGICAL HISTORY   has a past surgical history that includes tonsillectomy and thyroidectomy.    CURRENT MEDICATIONS  Home Medications    **Home medications have not yet been reviewed for this encounter**         ALLERGIES  Allergies   Allergen Reactions   • Aspirin Anaphylaxis   • Compazine Swelling   • Sulfa Drugs Rash   • Tetracyclines Swelling   • Ultram [Tramadol Hcl] Swelling   • Food      \"Green Peppers\"       PHYSICAL EXAM  VITAL SIGNS: /88   Pulse (!) 110   Temp 36 °C (96.8 °F)   Resp 18   Ht 1.727 m (5' 8\")   Wt 71.7 kg (158 lb 1.1 oz)   LMP 12/25/2019   SpO2 98%   BMI 24.03 kg/m²    GEN: Alert in no apparent distress.  Calm, conversant  SKIN: Warm, Dry, No erythema, No rash.  HEENT: Normocephalic, Atraumatic, Bilateral external ears normal. Nose normal. Pupils are equal and reactive. Conjunctiva normal, non-icteric.   GI: No distention  CV: Regular rate and rythm, no murmurs.    PULM: Clear to auscultation bilaterally.  NEURO: Alert, Grossly non-focal.   MS: No tender muscle compartments to the extremities.  Patient utilizes all extremities grossly normally.  PSYCH: Affect normal, Judgment normal, Mood normal, Appears appropriate and not intoxicated.       COURSE & MEDICAL DECISION MAKING  Patient has symptoms today highly suggestive of viral syndrome, possibly related to influenza.  She also has been having symptoms that would suggest side effects from medications including headache from ondansetron and nausea and vomiting from abruptly stopping her chronic Zyprexa.  She is not suicidal or homicidal and does not appear psychotic in any way.  Given her symptoms and reassuring physical exam I felt it was reasonable to treat " empirically using Phenergan as a replacement for Zofran and restarting her Zyprexa.  She additionally asked for Motrin and a prescription form.  I discussed options regarding influenza testing and I feel that it would be reasonable to treat empirically with Tamiflu as she was within the window as well.  I do not feel further laboratory evaluation or imaging would benefit the patient or  at this point but she did state clear understanding that if her symptoms worsen or change she would have to return for reevaluation and reconsideration of diagnostics.  The patient was quite pleased with this as she did not want to stay in the hospital very long and wanted to go home and sleep.  I do not suspect meningitis or encephalitis and the patient had no convincing findings to suggest sepsis.  She was somewhat tachycardic in terms of her triage vital signs however her heart rate was 88 by radial pulse on my exam.  As this was not sustained tachycardia, and she had no other sepsis criteria, I felt she was safe for discharge without any further treatment or evaluation beyond what we had already discussed.  FINAL IMPRESSION  1. Medication refill    2. Acute nonintractable headache, unspecified headache type    3. Non-intractable vomiting with nausea, unspecified vomiting type    4. Cough             Electronically signed by: Andi Paul M.D., 1/19/2020 5:41 AM

## 2020-03-06 ENCOUNTER — HOSPITAL ENCOUNTER (EMERGENCY)
Facility: MEDICAL CENTER | Age: 45
End: 2020-03-06
Attending: EMERGENCY MEDICINE
Payer: MEDICARE

## 2020-03-06 VITALS
HEART RATE: 92 BPM | BODY MASS INDEX: 22.32 KG/M2 | DIASTOLIC BLOOD PRESSURE: 81 MMHG | WEIGHT: 147.27 LBS | TEMPERATURE: 98 F | HEIGHT: 68 IN | OXYGEN SATURATION: 94 % | RESPIRATION RATE: 20 BRPM | SYSTOLIC BLOOD PRESSURE: 129 MMHG

## 2020-03-06 DIAGNOSIS — N76.0 BACTERIAL VAGINOSIS: ICD-10-CM

## 2020-03-06 DIAGNOSIS — B96.89 BACTERIAL VAGINOSIS: ICD-10-CM

## 2020-03-06 DIAGNOSIS — Z76.0 MEDICATION REFILL: ICD-10-CM

## 2020-03-06 DIAGNOSIS — F15.10 METHAMPHETAMINE ABUSE (HCC): ICD-10-CM

## 2020-03-06 LAB
ALBUMIN SERPL BCP-MCNC: 4 G/DL (ref 3.2–4.9)
ALBUMIN/GLOB SERPL: 1.4 G/DL
ALP SERPL-CCNC: 74 U/L (ref 30–99)
ALT SERPL-CCNC: 26 U/L (ref 2–50)
ANION GAP SERPL CALC-SCNC: 12 MMOL/L (ref 0–11.9)
APPEARANCE UR: ABNORMAL
AST SERPL-CCNC: 50 U/L (ref 12–45)
BACTERIA #/AREA URNS HPF: ABNORMAL /HPF
BASOPHILS # BLD AUTO: 1 % (ref 0–1.8)
BASOPHILS # BLD: 0.06 K/UL (ref 0–0.12)
BILIRUB SERPL-MCNC: 0.7 MG/DL (ref 0.1–1.5)
BILIRUB UR QL STRIP.AUTO: NEGATIVE
BUN SERPL-MCNC: 11 MG/DL (ref 8–22)
C TRACH DNA SPEC QL NAA+PROBE: NEGATIVE
CALCIUM SERPL-MCNC: 8.7 MG/DL (ref 8.5–10.5)
CANDIDA DNA VAG QL PROBE+SIG AMP: NEGATIVE
CHLORIDE SERPL-SCNC: 105 MMOL/L (ref 96–112)
CO2 SERPL-SCNC: 20 MMOL/L (ref 20–33)
COLOR UR: ABNORMAL
CREAT SERPL-MCNC: 0.97 MG/DL (ref 0.5–1.4)
EOSINOPHIL # BLD AUTO: 0.68 K/UL (ref 0–0.51)
EOSINOPHIL NFR BLD: 11 % (ref 0–6.9)
EPI CELLS #/AREA URNS HPF: ABNORMAL /HPF
ERYTHROCYTE [DISTWIDTH] IN BLOOD BY AUTOMATED COUNT: 61.8 FL (ref 35.9–50)
G VAGINALIS DNA VAG QL PROBE+SIG AMP: POSITIVE
GLOBULIN SER CALC-MCNC: 2.8 G/DL (ref 1.9–3.5)
GLUCOSE SERPL-MCNC: 106 MG/DL (ref 65–99)
GLUCOSE UR STRIP.AUTO-MCNC: NEGATIVE MG/DL
HCG SERPL QL: NEGATIVE
HCT VFR BLD AUTO: 43.1 % (ref 37–47)
HGB BLD-MCNC: 14.1 G/DL (ref 12–16)
HIV 1+2 AB+HIV1 P24 AG SERPL QL IA: NON REACTIVE
IMM GRANULOCYTES # BLD AUTO: 0.01 K/UL (ref 0–0.11)
IMM GRANULOCYTES NFR BLD AUTO: 0.2 % (ref 0–0.9)
KETONES UR STRIP.AUTO-MCNC: NEGATIVE MG/DL
LEUKOCYTE ESTERASE UR QL STRIP.AUTO: ABNORMAL
LYMPHOCYTES # BLD AUTO: 1.42 K/UL (ref 1–4.8)
LYMPHOCYTES NFR BLD: 23 % (ref 22–41)
MCH RBC QN AUTO: 29.2 PG (ref 27–33)
MCHC RBC AUTO-ENTMCNC: 32.7 G/DL (ref 33.6–35)
MCV RBC AUTO: 89.2 FL (ref 81.4–97.8)
MICRO URNS: ABNORMAL
MONOCYTES # BLD AUTO: 0.56 K/UL (ref 0–0.85)
MONOCYTES NFR BLD AUTO: 9.1 % (ref 0–13.4)
MUCOUS THREADS #/AREA URNS HPF: ABNORMAL /HPF
N GONORRHOEA DNA SPEC QL NAA+PROBE: NEGATIVE
NEUTROPHILS # BLD AUTO: 3.44 K/UL (ref 2–7.15)
NEUTROPHILS NFR BLD: 55.7 % (ref 44–72)
NITRITE UR QL STRIP.AUTO: NEGATIVE
NRBC # BLD AUTO: 0 K/UL
NRBC BLD-RTO: 0 /100 WBC
PH UR STRIP.AUTO: 5.5 [PH] (ref 5–8)
PLATELET # BLD AUTO: 186 K/UL (ref 164–446)
PMV BLD AUTO: 9.8 FL (ref 9–12.9)
POTASSIUM SERPL-SCNC: 3.2 MMOL/L (ref 3.6–5.5)
PROT SERPL-MCNC: 6.8 G/DL (ref 6–8.2)
PROT UR QL STRIP: NEGATIVE MG/DL
RBC # BLD AUTO: 4.83 M/UL (ref 4.2–5.4)
RBC # URNS HPF: >150 /HPF
RBC UR QL AUTO: ABNORMAL
SODIUM SERPL-SCNC: 137 MMOL/L (ref 135–145)
SP GR UR STRIP.AUTO: 1.01
SPECIMEN SOURCE: NORMAL
T VAGINALIS DNA VAG QL PROBE+SIG AMP: NEGATIVE
TREPONEMA PALLIDUM IGG+IGM AB [PRESENCE] IN SERUM OR PLASMA BY IMMUNOASSAY: NON REACTIVE
UROBILINOGEN UR STRIP.AUTO-MCNC: 0.2 MG/DL
WBC # BLD AUTO: 6.2 K/UL (ref 4.8–10.8)
WBC #/AREA URNS HPF: ABNORMAL /HPF

## 2020-03-06 PROCEDURE — 87660 TRICHOMONAS VAGIN DIR PROBE: CPT

## 2020-03-06 PROCEDURE — 86780 TREPONEMA PALLIDUM: CPT

## 2020-03-06 PROCEDURE — 80053 COMPREHEN METABOLIC PANEL: CPT

## 2020-03-06 PROCEDURE — 87510 GARDNER VAG DNA DIR PROBE: CPT

## 2020-03-06 PROCEDURE — 700102 HCHG RX REV CODE 250 W/ 637 OVERRIDE(OP): Performed by: EMERGENCY MEDICINE

## 2020-03-06 PROCEDURE — 99285 EMERGENCY DEPT VISIT HI MDM: CPT

## 2020-03-06 PROCEDURE — 87491 CHLMYD TRACH DNA AMP PROBE: CPT

## 2020-03-06 PROCEDURE — A9270 NON-COVERED ITEM OR SERVICE: HCPCS | Performed by: EMERGENCY MEDICINE

## 2020-03-06 PROCEDURE — 87591 N.GONORRHOEAE DNA AMP PROB: CPT

## 2020-03-06 PROCEDURE — 85025 COMPLETE CBC W/AUTO DIFF WBC: CPT

## 2020-03-06 PROCEDURE — 84703 CHORIONIC GONADOTROPIN ASSAY: CPT

## 2020-03-06 PROCEDURE — 87480 CANDIDA DNA DIR PROBE: CPT

## 2020-03-06 PROCEDURE — 700111 HCHG RX REV CODE 636 W/ 250 OVERRIDE (IP): Performed by: EMERGENCY MEDICINE

## 2020-03-06 PROCEDURE — 87086 URINE CULTURE/COLONY COUNT: CPT

## 2020-03-06 PROCEDURE — G0475 HIV COMBINATION ASSAY: HCPCS

## 2020-03-06 PROCEDURE — 81001 URINALYSIS AUTO W/SCOPE: CPT

## 2020-03-06 PROCEDURE — 87077 CULTURE AEROBIC IDENTIFY: CPT

## 2020-03-06 PROCEDURE — 96374 THER/PROPH/DIAG INJ IV PUSH: CPT

## 2020-03-06 RX ORDER — OMEPRAZOLE 20 MG/1
20 CAPSULE, DELAYED RELEASE ORAL ONCE
Status: COMPLETED | OUTPATIENT
Start: 2020-03-06 | End: 2020-03-06

## 2020-03-06 RX ORDER — METRONIDAZOLE 500 MG/1
500 TABLET ORAL 2 TIMES DAILY
Qty: 14 TAB | Refills: 0 | Status: SHIPPED | OUTPATIENT
Start: 2020-03-06 | End: 2020-03-13

## 2020-03-06 RX ORDER — ONDANSETRON 2 MG/ML
4 INJECTION INTRAMUSCULAR; INTRAVENOUS ONCE
Status: COMPLETED | OUTPATIENT
Start: 2020-03-06 | End: 2020-03-06

## 2020-03-06 RX ORDER — PROPRANOLOL HYDROCHLORIDE 10 MG/1
10 TABLET ORAL 3 TIMES DAILY
Qty: 90 TAB | Refills: 0 | Status: SHIPPED | OUTPATIENT
Start: 2020-03-06 | End: 2020-06-26

## 2020-03-06 RX ORDER — OLANZAPINE 10 MG/1
10 TABLET ORAL
Qty: 30 TAB | Refills: 0 | Status: SHIPPED | OUTPATIENT
Start: 2020-03-06 | End: 2020-03-18

## 2020-03-06 RX ORDER — PROPRANOLOL HYDROCHLORIDE 10 MG/1
10 TABLET ORAL ONCE
Status: COMPLETED | OUTPATIENT
Start: 2020-03-06 | End: 2020-03-06

## 2020-03-06 RX ADMIN — OMEPRAZOLE 20 MG: 20 CAPSULE, DELAYED RELEASE ORAL at 07:05

## 2020-03-06 RX ADMIN — PROPRANOLOL HYDROCHLORIDE 10 MG: 10 TABLET ORAL at 07:05

## 2020-03-06 RX ADMIN — ONDANSETRON 4 MG: 2 INJECTION INTRAMUSCULAR; INTRAVENOUS at 06:46

## 2020-03-06 SDOH — HEALTH STABILITY: MENTAL HEALTH: HOW OFTEN DO YOU HAVE 6 OR MORE DRINKS ON ONE OCCASION?: DAILY OR ALMOST DAILY

## 2020-03-06 ASSESSMENT — FIBROSIS 4 INDEX: FIB4 SCORE: 4.42
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
## 2020-03-06 NOTE — DISCHARGE INSTRUCTIONS
Obtain primary care doctor.  See  for recheck in 1 week if no improvement.  Return for any concerns.
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
## 2020-03-06 NOTE — ED NOTES
Break RN  Discharge instructions given to pt including follow up with [facility] or [facility] or returning if no improvement of symptoms or to return if worse. Prescription x 3 provided to pt. Questions answered by RN. Denies any new complaints. Discharged w/stable vitals and able to ambulate w/steady gait.

## 2020-03-06 NOTE — ED NOTES
Med Rec completed per patient   Allergies reviewed  No ORAL antibiotics in last 14 days    Patient states that it has been about 5 days since she has taken her Zyprexa and she is currently out of this medication
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
## 2020-03-18 ENCOUNTER — HOSPITAL ENCOUNTER (EMERGENCY)
Facility: MEDICAL CENTER | Age: 45
End: 2020-03-18
Attending: EMERGENCY MEDICINE
Payer: MEDICARE

## 2020-03-18 VITALS
OXYGEN SATURATION: 98 % | TEMPERATURE: 98.1 F | DIASTOLIC BLOOD PRESSURE: 58 MMHG | WEIGHT: 154.76 LBS | HEIGHT: 68 IN | HEART RATE: 72 BPM | RESPIRATION RATE: 16 BRPM | SYSTOLIC BLOOD PRESSURE: 142 MMHG | BODY MASS INDEX: 23.46 KG/M2

## 2020-03-18 DIAGNOSIS — F15.10 METHAMPHETAMINE ABUSE (HCC): ICD-10-CM

## 2020-03-18 DIAGNOSIS — F10.929 ALCOHOLIC INTOXICATION WITH COMPLICATION (HCC): ICD-10-CM

## 2020-03-18 DIAGNOSIS — F43.21 SITUATIONAL DEPRESSION: ICD-10-CM

## 2020-03-18 DIAGNOSIS — R45.851 SUICIDAL IDEATION: ICD-10-CM

## 2020-03-18 LAB
AMPHET UR QL SCN: POSITIVE
BARBITURATES UR QL SCN: NEGATIVE
BENZODIAZ UR QL SCN: NEGATIVE
BZE UR QL SCN: NEGATIVE
CANNABINOIDS UR QL SCN: POSITIVE
METHADONE UR QL SCN: NEGATIVE
OPIATES UR QL SCN: NEGATIVE
OXYCODONE UR QL SCN: NEGATIVE
PCP UR QL SCN: NEGATIVE
POC BREATHALIZER: 0.07 PERCENT (ref 0–0.01)
POC BREATHALIZER: 0.14 PERCENT (ref 0–0.01)
POC BREATHALIZER: 0.21 PERCENT (ref 0–0.01)
PROPOXYPH UR QL SCN: NEGATIVE

## 2020-03-18 PROCEDURE — 80307 DRUG TEST PRSMV CHEM ANLYZR: CPT

## 2020-03-18 PROCEDURE — 302970 POC BREATHALIZER: Performed by: EMERGENCY MEDICINE

## 2020-03-18 PROCEDURE — 90791 PSYCH DIAGNOSTIC EVALUATION: CPT

## 2020-03-18 PROCEDURE — 99285 EMERGENCY DEPT VISIT HI MDM: CPT

## 2020-03-18 PROCEDURE — A9270 NON-COVERED ITEM OR SERVICE: HCPCS | Performed by: EMERGENCY MEDICINE

## 2020-03-18 PROCEDURE — 700102 HCHG RX REV CODE 250 W/ 637 OVERRIDE(OP): Performed by: EMERGENCY MEDICINE

## 2020-03-18 RX ORDER — OLANZAPINE 10 MG/1
10 TABLET ORAL
Status: ON HOLD | COMMUNITY
End: 2020-07-07

## 2020-03-18 RX ORDER — CHLORDIAZEPOXIDE HYDROCHLORIDE 25 MG/1
25 CAPSULE, GELATIN COATED ORAL ONCE
Status: COMPLETED | OUTPATIENT
Start: 2020-03-18 | End: 2020-03-18

## 2020-03-18 RX ORDER — OLANZAPINE 5 MG/1
5 TABLET ORAL ONCE
Status: COMPLETED | OUTPATIENT
Start: 2020-03-18 | End: 2020-03-18

## 2020-03-18 RX ORDER — IBUPROFEN 200 MG
400 TABLET ORAL ONCE
COMMUNITY
End: 2020-06-26

## 2020-03-18 RX ORDER — LEVOTHYROXINE SODIUM 0.07 MG/1
75 TABLET ORAL
COMMUNITY

## 2020-03-18 RX ADMIN — OLANZAPINE 5 MG: 5 TABLET, FILM COATED ORAL at 19:54

## 2020-03-18 RX ADMIN — CHLORDIAZEPOXIDE HYDROCHLORIDE 25 MG: 25 CAPSULE ORAL at 19:54

## 2020-03-18 ASSESSMENT — LIFESTYLE VARIABLES
HAVE YOU EVER FELT YOU SHOULD CUT DOWN ON YOUR DRINKING: YES
AVERAGE NUMBER OF DAYS PER WEEK YOU HAVE A DRINK CONTAINING ALCOHOL: 5
TOTAL SCORE: 3
TOTAL SCORE: 3
EVER FELT BAD OR GUILTY ABOUT YOUR DRINKING: YES
EVER HAD A DRINK FIRST THING IN THE MORNING TO STEADY YOUR NERVES TO GET RID OF A HANGOVER: YES
CONSUMPTION TOTAL: INCOMPLETE
ON A TYPICAL DAY WHEN YOU DRINK ALCOHOL HOW MANY DRINKS DO YOU HAVE: 6
DO YOU DRINK ALCOHOL: YES
TOTAL SCORE: 3
HAVE PEOPLE ANNOYED YOU BY CRITICIZING YOUR DRINKING: NO

## 2020-03-18 ASSESSMENT — FIBROSIS 4 INDEX: FIB4 SCORE: 2.48
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
## 2020-03-18 NOTE — ED PROVIDER NOTES
ED PROVIDER NOTE    Scribed for Angelica Howell M.D. by Ajay Espinoza. 3/18/2020, 4:25 PM.    This is an addendum to the note on Nancy Olvera. For further details and full chart entry, see the previously signed ED Provider Note written by Dr. Kuo ([facility]).      4:25 PM - I discussed the patient's case with Dr. Kuo (ERP) who will transfer care of the patient to me at this time.  She is a chronic alcohol user who presents with suicidal ideation.  Disposition is pending sober reevaluation.        7:30 PM  - Patient was evaluated by Behavioral Health; she is reported to no longer be suicidal and Behavioral Health feels they are safe for discharge with outpatient resources for follow up.     7:40 PM - Upon reassessment, patient is resting comfortably with normal vital signs. She is clinically sober at this time and no longer endorses SI.  She does not meet criteria for legal hold at this time.  She does report feeling slightly shaky and has a history of alcohol withdrawal, and will be treated with her home dosage of Zyprexa and Librium and then discharged.     The patient will return for new or worsening symptoms and is stable at the time of discharge.    The patient is referred to a primary physician for blood pressure management, diabetic screening, and for all other preventative health concerns.    DISPOSITION:  Patient will be discharged home in stable condition.    FOLLOW UP:  [facility], Emergency Dept  [address] [id number] [phone]    If symptoms worsen      OUTPATIENT MEDICATIONS:  Discharge Medication List as of 3/18/2020  8:03 PM          FINAL IMPRESSION   (R45.851) Suicidal ideation  (F10.929) Alcoholic intoxication with complication (HCC)  (F15.10) Methamphetamine abuse (HCC)  (F43.21) Situational depression\     I, Ajay Espinoza (Scribe), am scribing for, and in the presence of, Agnelica Howell M.D..    Electronically signed by: Ajay Espinoza  (Scribe), 3/18/2020    Angelica QURESHI M.D. personally performed the services described in this documentation, as scribed by Ajay Espinoza in my presence, and it is both accurate and complete.    The note accurately reflects work and decisions made by me.  Angelica Howell M.D.  3/18/2020  9:35 PM

## 2020-03-18 NOTE — DISCHARGE PLANNING
Alert Team  I noted several pts in the [facility] with cocaine positive UDS', which is very unusual.  I stopped by and talked to pt about her test results.  She endorses using cannabis and methamphetamines, but vehemently denies using cocaine.    Reported this to lab and ER Nursing Supervisor Shelby.

## 2020-03-18 NOTE — DISCHARGE PLANNING
Alert Team  Noted consult order placed.  Pt is not currently ready for consult.  Pt will need:  -legal sobriety charted in labs  -medical clearance per ERP.    Please attempt to have PAR complete registration and UDS sent prior to consult.    Once pt is ready for consult, bedside RN to call AT to have pt added to consult list.

## 2020-03-19 NOTE — ED NOTES
"Pt discharged home via ambulatory to [facility] with steady gait, AOx4. Pt stated she would call a cab from the lobby. Pt in possession of belongings. Pt provided discharge education and information pertaining to medications and follow up appointments. Pt received copy of discharge instructions and verbalized understanding.     /58   Pulse 72   Temp 36.7 °C (98.1 °F) (Temporal)   Resp 16   Ht 1.727 m (5' 8\")   Wt 70.2 kg (154 lb 12.2 oz)   SpO2 98%   BMI 23.53 kg/m²   "
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
## 2020-04-06 ENCOUNTER — HOSPITAL ENCOUNTER (EMERGENCY)
Facility: MEDICAL CENTER | Age: 45
End: 2020-04-07
Attending: EMERGENCY MEDICINE
Payer: MEDICARE

## 2020-04-06 ENCOUNTER — APPOINTMENT (OUTPATIENT)
Dept: RADIOLOGY | Facility: MEDICAL CENTER | Age: 45
End: 2020-04-06
Attending: EMERGENCY MEDICINE
Payer: MEDICARE
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
## 2020-04-06 DIAGNOSIS — R45.851 SUICIDAL IDEATION: ICD-10-CM

## 2020-04-06 DIAGNOSIS — F15.10 METHAMPHETAMINE ABUSE (HCC): ICD-10-CM

## 2020-04-06 DIAGNOSIS — F10.920 ALCOHOLIC INTOXICATION WITHOUT COMPLICATION (HCC): ICD-10-CM

## 2020-04-06 LAB
ALBUMIN SERPL BCP-MCNC: 3.9 G/DL (ref 3.2–4.9)
ALBUMIN/GLOB SERPL: 1.6 G/DL
ALP SERPL-CCNC: 77 U/L (ref 30–99)
ALT SERPL-CCNC: 33 U/L (ref 2–50)
AMPHET UR QL SCN: POSITIVE
ANION GAP SERPL CALC-SCNC: 18 MMOL/L (ref 7–16)
AST SERPL-CCNC: 51 U/L (ref 12–45)
BARBITURATES UR QL SCN: NEGATIVE
BASOPHILS # BLD AUTO: 0.5 % (ref 0–1.8)
BASOPHILS # BLD: 0.03 K/UL (ref 0–0.12)
BENZODIAZ UR QL SCN: NEGATIVE
BILIRUB SERPL-MCNC: 0.4 MG/DL (ref 0.1–1.5)
BUN SERPL-MCNC: 11 MG/DL (ref 8–22)
BZE UR QL SCN: NEGATIVE
CALCIUM SERPL-MCNC: 8.7 MG/DL (ref 8.5–10.5)
CANNABINOIDS UR QL SCN: POSITIVE
CHLORIDE SERPL-SCNC: 103 MMOL/L (ref 96–112)
CO2 SERPL-SCNC: 20 MMOL/L (ref 20–33)
CREAT SERPL-MCNC: 0.68 MG/DL (ref 0.5–1.4)
EOSINOPHIL # BLD AUTO: 0.47 K/UL (ref 0–0.51)
EOSINOPHIL NFR BLD: 7.3 % (ref 0–6.9)
ERYTHROCYTE [DISTWIDTH] IN BLOOD BY AUTOMATED COUNT: 54.1 FL (ref 35.9–50)
GLOBULIN SER CALC-MCNC: 2.4 G/DL (ref 1.9–3.5)
GLUCOSE SERPL-MCNC: 96 MG/DL (ref 65–99)
HCG SERPL QL: NEGATIVE
HCT VFR BLD AUTO: 39.6 % (ref 37–47)
HGB BLD-MCNC: 13.3 G/DL (ref 12–16)
IMM GRANULOCYTES # BLD AUTO: 0.01 K/UL (ref 0–0.11)
IMM GRANULOCYTES NFR BLD AUTO: 0.2 % (ref 0–0.9)
LIPASE SERPL-CCNC: 91 U/L (ref 11–82)
LYMPHOCYTES # BLD AUTO: 1.41 K/UL (ref 1–4.8)
LYMPHOCYTES NFR BLD: 21.9 % (ref 22–41)
MCH RBC QN AUTO: 29.7 PG (ref 27–33)
MCHC RBC AUTO-ENTMCNC: 33.6 G/DL (ref 33.6–35)
MCV RBC AUTO: 88.4 FL (ref 81.4–97.8)
METHADONE UR QL SCN: NEGATIVE
MONOCYTES # BLD AUTO: 0.4 K/UL (ref 0–0.85)
MONOCYTES NFR BLD AUTO: 6.2 % (ref 0–13.4)
NEUTROPHILS # BLD AUTO: 4.12 K/UL (ref 2–7.15)
NEUTROPHILS NFR BLD: 63.9 % (ref 44–72)
NRBC # BLD AUTO: 0 K/UL
NRBC BLD-RTO: 0 /100 WBC
OPIATES UR QL SCN: NEGATIVE
OXYCODONE UR QL SCN: NEGATIVE
PCP UR QL SCN: NEGATIVE
PLATELET # BLD AUTO: 223 K/UL (ref 164–446)
PMV BLD AUTO: 9 FL (ref 9–12.9)
POC BREATHALIZER: 0.15 PERCENT (ref 0–0.01)
POTASSIUM SERPL-SCNC: 3.5 MMOL/L (ref 3.6–5.5)
PROPOXYPH UR QL SCN: NEGATIVE
PROT SERPL-MCNC: 6.3 G/DL (ref 6–8.2)
RBC # BLD AUTO: 4.48 M/UL (ref 4.2–5.4)
SODIUM SERPL-SCNC: 141 MMOL/L (ref 135–145)
WBC # BLD AUTO: 6.4 K/UL (ref 4.8–10.8)

## 2020-04-06 PROCEDURE — 80307 DRUG TEST PRSMV CHEM ANLYZR: CPT

## 2020-04-06 PROCEDURE — 80053 COMPREHEN METABOLIC PANEL: CPT

## 2020-04-06 PROCEDURE — 84703 CHORIONIC GONADOTROPIN ASSAY: CPT

## 2020-04-06 PROCEDURE — 302970 POC BREATHALIZER: Performed by: EMERGENCY MEDICINE

## 2020-04-06 PROCEDURE — 70450 CT HEAD/BRAIN W/O DYE: CPT

## 2020-04-06 PROCEDURE — 85025 COMPLETE CBC W/AUTO DIFF WBC: CPT

## 2020-04-06 PROCEDURE — 99285 EMERGENCY DEPT VISIT HI MDM: CPT

## 2020-04-06 PROCEDURE — 83690 ASSAY OF LIPASE: CPT

## 2020-04-06 PROCEDURE — 302970 POC BREATHALIZER

## 2020-04-06 RX ORDER — OLANZAPINE 5 MG/1
5 TABLET ORAL EVERY EVENING
Status: DISCONTINUED | OUTPATIENT
Start: 2020-04-07 | End: 2020-04-07

## 2020-04-06 ASSESSMENT — FIBROSIS 4 INDEX: FIB4 SCORE: 2.48

## 2020-04-07 VITALS
TEMPERATURE: 97 F | DIASTOLIC BLOOD PRESSURE: 66 MMHG | WEIGHT: 146.39 LBS | BODY MASS INDEX: 22.98 KG/M2 | SYSTOLIC BLOOD PRESSURE: 110 MMHG | HEART RATE: 84 BPM | OXYGEN SATURATION: 98 % | HEIGHT: 67 IN | RESPIRATION RATE: 12 BRPM

## 2020-04-07 LAB — POC BREATHALIZER: 0.03 PERCENT (ref 0–0.01)

## 2020-04-07 PROCEDURE — 700102 HCHG RX REV CODE 250 W/ 637 OVERRIDE(OP): Performed by: EMERGENCY MEDICINE

## 2020-04-07 PROCEDURE — A9270 NON-COVERED ITEM OR SERVICE: HCPCS | Performed by: EMERGENCY MEDICINE

## 2020-04-07 PROCEDURE — 90791 PSYCH DIAGNOSTIC EVALUATION: CPT

## 2020-04-07 PROCEDURE — 302970 POC BREATHALIZER: Performed by: EMERGENCY MEDICINE

## 2020-04-07 RX ORDER — LEVOTHYROXINE SODIUM 0.07 MG/1
75 TABLET ORAL
Status: DISCONTINUED | OUTPATIENT
Start: 2020-04-07 | End: 2020-04-07 | Stop reason: HOSPADM

## 2020-04-07 RX ORDER — OLANZAPINE 5 MG/1
5 TABLET ORAL ONCE
Status: COMPLETED | OUTPATIENT
Start: 2020-04-07 | End: 2020-04-07

## 2020-04-07 RX ORDER — OLANZAPINE 5 MG/1
10 TABLET ORAL DAILY
Status: DISCONTINUED | OUTPATIENT
Start: 2020-04-07 | End: 2020-04-07 | Stop reason: HOSPADM

## 2020-04-07 RX ADMIN — LEVOTHYROXINE SODIUM 75 MCG: 75 TABLET ORAL at 06:55

## 2020-04-07 RX ADMIN — OLANZAPINE 5 MG: 5 TABLET, FILM COATED ORAL at 04:52
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
## 2020-04-07 NOTE — ED PROVIDER NOTES
ED Provider Note    5:51 AM Patient reevaluated.  Patient is on a legal hold, waiting transfer to psychiatric facility when a bed is available.  Please refer to initial note for complete details.  No concerns or abnormal behavior since being placed on legal hold.  Patient ambulates to the bathroom independently, awaiting breakfast.  Medication reconciliation has been reviewed, home medications have been reordered.

## 2020-04-07 NOTE — ED NOTES
Attempted to give report to staff at [facility].  Advised the already received report and were expecting pt soon.

## 2020-04-07 NOTE — DISCHARGE INSTRUCTIONS
You were evaluated today for suicidal ideation. Your physical exam and labs were reassuring. You were medically cleared in the emergency department, had a psychiatric evaluation performed and you are being discharged from the emergency department. Please follow all the recommendations set by your psychiatrist.    If you have thoughts of suicide, please seek help. This may be a family member, friend, mental health professional, suicide hotline, or any emergency department.     National Suicide Prevention Lifeline: 1-311.727.9439  Available 24hours a day, 7 days a week    Please return to the ED or seek medical attention if you develop:  Fever, severe headache, vomiting, thoughts of suicide or other concerning findings    ================================  Coronavirus Information    Your visit did NOT relate to coronavirus, but if you or your family develop symptoms that concern you for coronavirus (please see CDC website for symptoms), please contact the [facility] hotline (or your local health department)  or your healthcare provider before going to a medical facility:    [facility]  Daytime hours: [phone]  Anytime: 311    Information is available from the Centers for Disease Control and Prevention  www.CDC.gov    and     [facility]  [email or web address]    If you are severely ill or having a hard time breathing, please immediatly seek medical care. Notify the  or Emergency Department Triage about your symptoms.

## 2020-04-07 NOTE — ED NOTES
Pt rounded on, resting in bed, respirations even and unlabored, repositioning self as needed, updated on POC. Waiting for patient to become sober to contact PD.

## 2020-04-07 NOTE — DISCHARGE PLANNING
Selina from [facility] called and they will accept Pt.  Dr. Castle will be admitting physician.    MTM contacted and transportation authorization arranged with [facility].  PCS completed and faxed to [facility].  Transportation arranged for 0930    Transfer Packet completed with Original Legal Hold placed inside.  RN updated on transfer and transfer time.    Selina at [facility] aware of 0930 [facility] transportation time.
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
## 2020-04-07 NOTE — ED NOTES
ERP at bedside. Patient is now saying she will kill herself if she goes home. The patient now has a sitter and will meet with the ALERT team for evaluation now that she is sober. Sitter in direct view of patient and checklist in use.
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
## 2020-04-07 NOTE — ED NOTES
Patient placed on Legal 2000 by ALERT team member, Silvia, at 0521 for Suicidal Ideation with a plan to overdose on propranolol medication. The plan is to transfer to [facility] or [facility].
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
## 2020-04-07 NOTE — DISCHARGE PLANNING
Medical Social Work    Referral: Legal Hold    Intervention: Legal Hold Paperwork given to SW by [facility] RN: Silvia    Legal Hold Initiated: Date: 04/07/2020  Time: 0521    Legal Hold faxed: Date: 04/07/2020  Time: 0650    Patient’s Insurance Listed on Face Sheet: Medicare and Medicaid FFS    Referrals sent to: [facility], [facility] and [facility]    Plan: Patient will transfer to mental health facility once acceptance is obtained.

## 2020-04-07 NOTE — ED NOTES
Pt rounded on, resting in bed, respirations even and unlabored, repositioning self as needed.
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
## 2020-04-07 NOTE — ED NOTES
Rounded on patient who is sleeping in bed, equal chest rise and fall noted, no apparent needs at this time. Still waiting for patient to become sober.

## 2020-04-07 NOTE — ED NOTES
"Received report from AUREA Mendenhall, for the triaged complaint. Female RN at bedside to place patient in hospital gown, breathalyze the patient, and obtain a urine sample. Patient is cooperative, tearful, and states, \"I just want help.\" The patient is a HIGH on the CSS.  Sitter in direct view of patient and chart is up for ERP.  "

## 2020-06-26 ENCOUNTER — HOSPITAL ENCOUNTER (INPATIENT)
Facility: MEDICAL CENTER | Age: 45
LOS: 11 days | DRG: 897 | End: 2020-07-07
Attending: EMERGENCY MEDICINE | Admitting: HOSPITALIST
Payer: MEDICARE

## 2020-06-26 ENCOUNTER — APPOINTMENT (OUTPATIENT)
Dept: RADIOLOGY | Facility: MEDICAL CENTER | Age: 45
DRG: 897 | End: 2020-06-26
Attending: EMERGENCY MEDICINE
Payer: MEDICARE

## 2020-06-26 ENCOUNTER — APPOINTMENT (OUTPATIENT)
Dept: RADIOLOGY | Facility: MEDICAL CENTER | Age: 45
DRG: 897 | End: 2020-06-26
Attending: HOSPITALIST
Payer: MEDICARE

## 2020-06-26 DIAGNOSIS — K85.20 ALCOHOL-INDUCED ACUTE PANCREATITIS, UNSPECIFIED COMPLICATION STATUS: ICD-10-CM

## 2020-06-26 DIAGNOSIS — F10.20 ALCOHOLISM (HCC): ICD-10-CM

## 2020-06-26 DIAGNOSIS — R45.851 SUICIDAL IDEATION: ICD-10-CM

## 2020-06-26 PROBLEM — F10.229 ALCOHOL DEPENDENCE WITH INTOXICATION (HCC): Status: ACTIVE | Noted: 2019-07-30

## 2020-06-26 PROBLEM — K21.00 GASTROESOPHAGEAL REFLUX DISEASE WITH ESOPHAGITIS: Status: ACTIVE | Noted: 2020-06-26

## 2020-06-26 LAB
ALBUMIN SERPL BCP-MCNC: 3.7 G/DL (ref 3.2–4.9)
ALBUMIN/GLOB SERPL: 1.3 G/DL
ALP SERPL-CCNC: 103 U/L (ref 30–99)
ALT SERPL-CCNC: 51 U/L (ref 2–50)
AMPHET UR QL SCN: POSITIVE
ANION GAP SERPL CALC-SCNC: 19 MMOL/L (ref 7–16)
APTT PPP: 31.2 SEC (ref 24.7–36)
AST SERPL-CCNC: 100 U/L (ref 12–45)
BARBITURATES UR QL SCN: NEGATIVE
BASOPHILS # BLD AUTO: 0.5 % (ref 0–1.8)
BASOPHILS # BLD: 0.04 K/UL (ref 0–0.12)
BENZODIAZ UR QL SCN: NEGATIVE
BILIRUB SERPL-MCNC: 0.4 MG/DL (ref 0.1–1.5)
BUN SERPL-MCNC: 6 MG/DL (ref 8–22)
BZE UR QL SCN: NEGATIVE
CALCIUM SERPL-MCNC: 8.6 MG/DL (ref 8.5–10.5)
CANNABINOIDS UR QL SCN: POSITIVE
CHLORIDE SERPL-SCNC: 101 MMOL/L (ref 96–112)
CO2 SERPL-SCNC: 21 MMOL/L (ref 20–33)
CREAT SERPL-MCNC: 0.62 MG/DL (ref 0.5–1.4)
EOSINOPHIL # BLD AUTO: 0.39 K/UL (ref 0–0.51)
EOSINOPHIL NFR BLD: 5.3 % (ref 0–6.9)
ERYTHROCYTE [DISTWIDTH] IN BLOOD BY AUTOMATED COUNT: 58.6 FL (ref 35.9–50)
ETHANOL BLD-MCNC: 388.6 MG/DL (ref 0–10.1)
GLOBULIN SER CALC-MCNC: 2.9 G/DL (ref 1.9–3.5)
GLUCOSE SERPL-MCNC: 106 MG/DL (ref 65–99)
HCG SERPL QL: NEGATIVE
HCT VFR BLD AUTO: 46.2 % (ref 37–47)
HGB BLD-MCNC: 16.1 G/DL (ref 12–16)
IMM GRANULOCYTES # BLD AUTO: 0.02 K/UL (ref 0–0.11)
IMM GRANULOCYTES NFR BLD AUTO: 0.3 % (ref 0–0.9)
INR PPP: 0.86 (ref 0.87–1.13)
LIPASE SERPL-CCNC: 239 U/L (ref 11–82)
LYMPHOCYTES # BLD AUTO: 1.9 K/UL (ref 1–4.8)
LYMPHOCYTES NFR BLD: 25.7 % (ref 22–41)
MCH RBC QN AUTO: 30.5 PG (ref 27–33)
MCHC RBC AUTO-ENTMCNC: 34.8 G/DL (ref 33.6–35)
MCV RBC AUTO: 87.5 FL (ref 81.4–97.8)
METHADONE UR QL SCN: NEGATIVE
MONOCYTES # BLD AUTO: 0.47 K/UL (ref 0–0.85)
MONOCYTES NFR BLD AUTO: 6.4 % (ref 0–13.4)
NEUTROPHILS # BLD AUTO: 4.56 K/UL (ref 2–7.15)
NEUTROPHILS NFR BLD: 61.8 % (ref 44–72)
NRBC # BLD AUTO: 0 K/UL
NRBC BLD-RTO: 0 /100 WBC
OPIATES UR QL SCN: NEGATIVE
OXYCODONE UR QL SCN: NEGATIVE
PCP UR QL SCN: NEGATIVE
PLATELET # BLD AUTO: 101 K/UL (ref 164–446)
PMV BLD AUTO: 9.6 FL (ref 9–12.9)
POTASSIUM SERPL-SCNC: 3.8 MMOL/L (ref 3.6–5.5)
PROPOXYPH UR QL SCN: NEGATIVE
PROT SERPL-MCNC: 6.6 G/DL (ref 6–8.2)
PROTHROMBIN TIME: 12 SEC (ref 12–14.6)
RBC # BLD AUTO: 5.28 M/UL (ref 4.2–5.4)
SODIUM SERPL-SCNC: 141 MMOL/L (ref 135–145)
WBC # BLD AUTO: 7.4 K/UL (ref 4.8–10.8)

## 2020-06-26 PROCEDURE — A9270 NON-COVERED ITEM OR SERVICE: HCPCS | Performed by: HOSPITALIST

## 2020-06-26 PROCEDURE — 99285 EMERGENCY DEPT VISIT HI MDM: CPT

## 2020-06-26 PROCEDURE — 700111 HCHG RX REV CODE 636 W/ 250 OVERRIDE (IP): Performed by: HOSPITALIST

## 2020-06-26 PROCEDURE — 85025 COMPLETE CBC W/AUTO DIFF WBC: CPT

## 2020-06-26 PROCEDURE — 96365 THER/PROPH/DIAG IV INF INIT: CPT

## 2020-06-26 PROCEDURE — 700105 HCHG RX REV CODE 258: Performed by: HOSPITALIST

## 2020-06-26 PROCEDURE — HZ2ZZZZ DETOXIFICATION SERVICES FOR SUBSTANCE ABUSE TREATMENT: ICD-10-PCS | Performed by: HOSPITALIST

## 2020-06-26 PROCEDURE — 96375 TX/PRO/DX INJ NEW DRUG ADDON: CPT

## 2020-06-26 PROCEDURE — 80053 COMPREHEN METABOLIC PANEL: CPT

## 2020-06-26 PROCEDURE — 700111 HCHG RX REV CODE 636 W/ 250 OVERRIDE (IP): Performed by: EMERGENCY MEDICINE

## 2020-06-26 PROCEDURE — 770020 HCHG ROOM/CARE - TELE (206)

## 2020-06-26 PROCEDURE — 99223 1ST HOSP IP/OBS HIGH 75: CPT | Mod: AI | Performed by: HOSPITALIST

## 2020-06-26 PROCEDURE — 84703 CHORIONIC GONADOTROPIN ASSAY: CPT

## 2020-06-26 PROCEDURE — 700102 HCHG RX REV CODE 250 W/ 637 OVERRIDE(OP): Performed by: EMERGENCY MEDICINE

## 2020-06-26 PROCEDURE — 71045 X-RAY EXAM CHEST 1 VIEW: CPT

## 2020-06-26 PROCEDURE — 85730 THROMBOPLASTIN TIME PARTIAL: CPT

## 2020-06-26 PROCEDURE — 96366 THER/PROPH/DIAG IV INF ADDON: CPT

## 2020-06-26 PROCEDURE — 83690 ASSAY OF LIPASE: CPT

## 2020-06-26 PROCEDURE — 700102 HCHG RX REV CODE 250 W/ 637 OVERRIDE(OP): Performed by: HOSPITALIST

## 2020-06-26 PROCEDURE — A9270 NON-COVERED ITEM OR SERVICE: HCPCS | Performed by: EMERGENCY MEDICINE

## 2020-06-26 PROCEDURE — 74177 CT ABD & PELVIS W/CONTRAST: CPT

## 2020-06-26 PROCEDURE — 700101 HCHG RX REV CODE 250: Performed by: EMERGENCY MEDICINE

## 2020-06-26 PROCEDURE — 80307 DRUG TEST PRSMV CHEM ANLYZR: CPT

## 2020-06-26 PROCEDURE — 85610 PROTHROMBIN TIME: CPT

## 2020-06-26 PROCEDURE — 700117 HCHG RX CONTRAST REV CODE 255: Performed by: EMERGENCY MEDICINE

## 2020-06-26 PROCEDURE — 36415 COLL VENOUS BLD VENIPUNCTURE: CPT

## 2020-06-26 RX ORDER — METOCLOPRAMIDE HYDROCHLORIDE 5 MG/ML
10 INJECTION INTRAMUSCULAR; INTRAVENOUS ONCE
Status: COMPLETED | OUTPATIENT
Start: 2020-06-26 | End: 2020-06-26

## 2020-06-26 RX ORDER — LORAZEPAM 2 MG/ML
0.5 INJECTION INTRAMUSCULAR EVERY 4 HOURS PRN
Status: DISCONTINUED | OUTPATIENT
Start: 2020-06-26 | End: 2020-06-29

## 2020-06-26 RX ORDER — SODIUM CHLORIDE, SODIUM LACTATE, POTASSIUM CHLORIDE, CALCIUM CHLORIDE 600; 310; 30; 20 MG/100ML; MG/100ML; MG/100ML; MG/100ML
INJECTION, SOLUTION INTRAVENOUS CONTINUOUS
Status: DISCONTINUED | OUTPATIENT
Start: 2020-06-26 | End: 2020-07-01

## 2020-06-26 RX ORDER — CHLORDIAZEPOXIDE HYDROCHLORIDE 25 MG/1
25 CAPSULE, GELATIN COATED ORAL EVERY 6 HOURS
Status: COMPLETED | OUTPATIENT
Start: 2020-06-27 | End: 2020-06-29

## 2020-06-26 RX ORDER — DIVALPROEX SODIUM 500 MG/1
1000 TABLET, EXTENDED RELEASE ORAL
Status: ON HOLD | COMMUNITY
End: 2020-07-07

## 2020-06-26 RX ORDER — FOLIC ACID 1 MG/1
1 TABLET ORAL DAILY
Status: DISPENSED | OUTPATIENT
Start: 2020-06-26 | End: 2020-06-30

## 2020-06-26 RX ORDER — LORAZEPAM 2 MG/ML
1.5 INJECTION INTRAMUSCULAR
Status: DISCONTINUED | OUTPATIENT
Start: 2020-06-26 | End: 2020-06-29

## 2020-06-26 RX ORDER — OLANZAPINE 5 MG/1
10 TABLET ORAL
Status: DISCONTINUED | OUTPATIENT
Start: 2020-06-26 | End: 2020-07-07

## 2020-06-26 RX ORDER — OMEPRAZOLE 20 MG/1
20 CAPSULE, DELAYED RELEASE ORAL 2 TIMES DAILY
Status: DISCONTINUED | OUTPATIENT
Start: 2020-06-26 | End: 2020-07-07 | Stop reason: HOSPADM

## 2020-06-26 RX ORDER — LORAZEPAM 1 MG/1
0.5 TABLET ORAL EVERY 4 HOURS PRN
Status: DISCONTINUED | OUTPATIENT
Start: 2020-06-26 | End: 2020-06-29

## 2020-06-26 RX ORDER — LORAZEPAM 1 MG/1
1 TABLET ORAL EVERY 4 HOURS PRN
Status: DISCONTINUED | OUTPATIENT
Start: 2020-06-26 | End: 2020-06-29

## 2020-06-26 RX ORDER — LEVOTHYROXINE SODIUM 0.07 MG/1
75 TABLET ORAL
Status: DISCONTINUED | OUTPATIENT
Start: 2020-06-27 | End: 2020-07-07 | Stop reason: HOSPADM

## 2020-06-26 RX ORDER — PROPRANOLOL HYDROCHLORIDE 10 MG/1
10 TABLET ORAL 2 TIMES DAILY
Status: ON HOLD | COMMUNITY
End: 2020-07-07

## 2020-06-26 RX ORDER — OMEPRAZOLE 20 MG/1
20 CAPSULE, DELAYED RELEASE ORAL 2 TIMES DAILY
COMMUNITY

## 2020-06-26 RX ORDER — THIAMINE MONONITRATE (VIT B1) 100 MG
100 TABLET ORAL DAILY
Status: DISPENSED | OUTPATIENT
Start: 2020-06-26 | End: 2020-06-30

## 2020-06-26 RX ORDER — LORAZEPAM 2 MG/1
2 TABLET ORAL
Status: DISCONTINUED | OUTPATIENT
Start: 2020-06-26 | End: 2020-06-29

## 2020-06-26 RX ORDER — ONDANSETRON 4 MG/1
4 TABLET, ORALLY DISINTEGRATING ORAL EVERY 4 HOURS PRN
Status: DISCONTINUED | OUTPATIENT
Start: 2020-06-26 | End: 2020-07-07 | Stop reason: HOSPADM

## 2020-06-26 RX ORDER — CLONIDINE HYDROCHLORIDE 0.1 MG/1
0.1 TABLET ORAL
Status: DISCONTINUED | OUTPATIENT
Start: 2020-06-26 | End: 2020-06-29

## 2020-06-26 RX ORDER — GABAPENTIN 300 MG/1
300 CAPSULE ORAL 3 TIMES DAILY
COMMUNITY

## 2020-06-26 RX ORDER — OXYCODONE HYDROCHLORIDE 5 MG/1
5 TABLET ORAL EVERY 4 HOURS PRN
Status: DISCONTINUED | OUTPATIENT
Start: 2020-06-26 | End: 2020-07-07 | Stop reason: HOSPADM

## 2020-06-26 RX ORDER — GABAPENTIN 300 MG/1
300 CAPSULE ORAL 3 TIMES DAILY
Status: DISCONTINUED | OUTPATIENT
Start: 2020-06-26 | End: 2020-07-07 | Stop reason: HOSPADM

## 2020-06-26 RX ORDER — ONDANSETRON 2 MG/ML
4 INJECTION INTRAMUSCULAR; INTRAVENOUS EVERY 4 HOURS PRN
Status: DISCONTINUED | OUTPATIENT
Start: 2020-06-26 | End: 2020-07-07 | Stop reason: HOSPADM

## 2020-06-26 RX ORDER — PROPRANOLOL HYDROCHLORIDE 20 MG/1
10 TABLET ORAL 2 TIMES DAILY
Status: DISCONTINUED | OUTPATIENT
Start: 2020-06-26 | End: 2020-07-01

## 2020-06-26 RX ORDER — LORAZEPAM 2 MG/1
4 TABLET ORAL
Status: DISCONTINUED | OUTPATIENT
Start: 2020-06-26 | End: 2020-06-29

## 2020-06-26 RX ORDER — LABETALOL HYDROCHLORIDE 5 MG/ML
10 INJECTION, SOLUTION INTRAVENOUS EVERY 4 HOURS PRN
Status: DISCONTINUED | OUTPATIENT
Start: 2020-06-26 | End: 2020-07-07 | Stop reason: HOSPADM

## 2020-06-26 RX ORDER — DIVALPROEX SODIUM 125 MG/1
125 CAPSULE, COATED PELLETS ORAL 2 TIMES DAILY
COMMUNITY
End: 2020-06-26

## 2020-06-26 RX ORDER — DIVALPROEX SODIUM 500 MG/1
1000 TABLET, EXTENDED RELEASE ORAL
Status: DISCONTINUED | OUTPATIENT
Start: 2020-06-26 | End: 2020-06-28

## 2020-06-26 RX ORDER — CHLORDIAZEPOXIDE HYDROCHLORIDE 25 MG/1
50 CAPSULE, GELATIN COATED ORAL EVERY 6 HOURS
Status: COMPLETED | OUTPATIENT
Start: 2020-06-26 | End: 2020-06-27

## 2020-06-26 RX ORDER — LORAZEPAM 2 MG/ML
1 INJECTION INTRAMUSCULAR
Status: DISCONTINUED | OUTPATIENT
Start: 2020-06-26 | End: 2020-06-29

## 2020-06-26 RX ORDER — ACETAMINOPHEN 325 MG/1
650 TABLET ORAL EVERY 6 HOURS PRN
Status: DISCONTINUED | OUTPATIENT
Start: 2020-06-26 | End: 2020-07-07 | Stop reason: HOSPADM

## 2020-06-26 RX ORDER — LORAZEPAM 2 MG/ML
2 INJECTION INTRAMUSCULAR
Status: DISCONTINUED | OUTPATIENT
Start: 2020-06-26 | End: 2020-06-29

## 2020-06-26 RX ADMIN — DIVALPROEX SODIUM 1000 MG: 500 TABLET, EXTENDED RELEASE ORAL at 20:19

## 2020-06-26 RX ADMIN — SODIUM CHLORIDE, POTASSIUM CHLORIDE, SODIUM LACTATE AND CALCIUM CHLORIDE: 600; 310; 30; 20 INJECTION, SOLUTION INTRAVENOUS at 23:29

## 2020-06-26 RX ADMIN — CHLORDIAZEPOXIDE HYDROCHLORIDE 50 MG: 25 CAPSULE ORAL at 14:56

## 2020-06-26 RX ADMIN — ONDANSETRON 4 MG: 2 INJECTION INTRAMUSCULAR; INTRAVENOUS at 20:18

## 2020-06-26 RX ADMIN — GABAPENTIN 300 MG: 300 CAPSULE ORAL at 14:57

## 2020-06-26 RX ADMIN — IOHEXOL 100 ML: 350 INJECTION, SOLUTION INTRAVENOUS at 11:31

## 2020-06-26 RX ADMIN — CHLORDIAZEPOXIDE HYDROCHLORIDE 50 MG: 25 CAPSULE ORAL at 23:15

## 2020-06-26 RX ADMIN — LIDOCAINE HYDROCHLORIDE 30 ML: 20 SOLUTION OROPHARYNGEAL at 10:24

## 2020-06-26 RX ADMIN — SODIUM CHLORIDE, POTASSIUM CHLORIDE, SODIUM LACTATE AND CALCIUM CHLORIDE: 600; 310; 30; 20 INJECTION, SOLUTION INTRAVENOUS at 14:54

## 2020-06-26 RX ADMIN — METOCLOPRAMIDE 10 MG: 5 INJECTION, SOLUTION INTRAMUSCULAR; INTRAVENOUS at 10:26

## 2020-06-26 RX ADMIN — GABAPENTIN 300 MG: 300 CAPSULE ORAL at 20:20

## 2020-06-26 RX ADMIN — CHLORDIAZEPOXIDE HYDROCHLORIDE 50 MG: 25 CAPSULE ORAL at 20:19

## 2020-06-26 RX ADMIN — OXYCODONE 5 MG: 5 TABLET ORAL at 20:20

## 2020-06-26 RX ADMIN — OLANZAPINE 10 MG: 5 TABLET, FILM COATED ORAL at 20:20

## 2020-06-26 RX ADMIN — PROPRANOLOL HYDROCHLORIDE 10 MG: 20 TABLET ORAL at 17:25

## 2020-06-26 RX ADMIN — THIAMINE HYDROCHLORIDE: 100 INJECTION, SOLUTION INTRAMUSCULAR; INTRAVENOUS at 10:26

## 2020-06-26 RX ADMIN — LORAZEPAM 1.5 MG: 2 INJECTION INTRAMUSCULAR; INTRAVENOUS at 20:31

## 2020-06-26 RX ADMIN — OMEPRAZOLE 20 MG: 20 CAPSULE, DELAYED RELEASE ORAL at 17:24

## 2020-06-26 ASSESSMENT — LIFESTYLE VARIABLES
PAROXYSMAL SWEATS: NO SWEAT VISIBLE
PAROXYSMAL SWEATS: *
AGITATION: SOMEWHAT MORE THAN NORMAL ACTIVITY
TOTAL SCORE: 17
VISUAL DISTURBANCES: NOT PRESENT
TREMOR: TREMOR NOT VISIBLE BUT CAN BE FELT, FINGERTIP TO FINGERTIP
TOTAL SCORE: 13
ANXIETY: MILDLY ANXIOUS
PAROXYSMAL SWEATS: BARELY PERCEPTIBLE SWEATING. PALMS MOIST
ORIENTATION AND CLOUDING OF SENSORIUM: ORIENTED AND CAN DO SERIAL ADDITIONS
NAUSEA AND VOMITING: MILD NAUSEA WITH NO VOMITING
ORIENTATION AND CLOUDING OF SENSORIUM: ORIENTED AND CAN DO SERIAL ADDITIONS
AUDITORY DISTURBANCES: NOT PRESENT
SUBSTANCE_ABUSE: 1
PAROXYSMAL SWEATS: BARELY PERCEPTIBLE SWEATING. PALMS MOIST
HEADACHE, FULLNESS IN HEAD: VERY MILD
ANXIETY: MODERATELY ANXIOUS OR GUARDED, SO ANXIETY IS INFERRED
AGITATION: NORMAL ACTIVITY
HEADACHE, FULLNESS IN HEAD: NOT PRESENT
NAUSEA AND VOMITING: MILD NAUSEA WITH NO VOMITING
TREMOR: TREMOR NOT VISIBLE BUT CAN BE FELT, FINGERTIP TO FINGERTIP
TOTAL SCORE: 4
AUDITORY DISTURBANCES: NOT PRESENT
NAUSEA AND VOMITING: *
TOTAL SCORE: 3
TREMOR: *
VISUAL DISTURBANCES: NOT PRESENT
AUDITORY DISTURBANCES: MODERATE HARSHNESS OR ABILITY TO FRIGHTEN
TREMOR: TREMOR NOT VISIBLE BUT CAN BE FELT, FINGERTIP TO FINGERTIP
ANXIETY: *
AUDITORY DISTURBANCES: NOT PRESENT
ORIENTATION AND CLOUDING OF SENSORIUM: ORIENTED AND CAN DO SERIAL ADDITIONS
ORIENTATION AND CLOUDING OF SENSORIUM: ORIENTED AND CAN DO SERIAL ADDITIONS
HEADACHE, FULLNESS IN HEAD: NOT PRESENT
AGITATION: SOMEWHAT MORE THAN NORMAL ACTIVITY
ANXIETY: MILDLY ANXIOUS
VISUAL DISTURBANCES: NOT PRESENT
VISUAL DISTURBANCES: MILD SENSITIVITY
AGITATION: NORMAL ACTIVITY
HEADACHE, FULLNESS IN HEAD: MODERATE
NAUSEA AND VOMITING: MILD NAUSEA WITH NO VOMITING
EVER_SMOKED: NEVER

## 2020-06-26 ASSESSMENT — COGNITIVE AND FUNCTIONAL STATUS - GENERAL
SUGGESTED CMS G CODE MODIFIER MOBILITY: CH
SUGGESTED CMS G CODE MODIFIER DAILY ACTIVITY: CH
DAILY ACTIVITIY SCORE: 24
MOBILITY SCORE: 24

## 2020-06-26 ASSESSMENT — ENCOUNTER SYMPTOMS
STRIDOR: 0
BLURRED VISION: 0
HALLUCINATIONS: 0
POLYDIPSIA: 0
COUGH: 0
NAUSEA: 1
HEADACHES: 0
TINGLING: 0
BACK PAIN: 0
DOUBLE VISION: 0
SHORTNESS OF BREATH: 0
SINUS PAIN: 0
ABDOMINAL PAIN: 1
MYALGIAS: 0
HEMOPTYSIS: 0
BLOOD IN STOOL: 0
VOMITING: 1
PALPITATIONS: 0
DEPRESSION: 0
DIZZINESS: 1
DIARRHEA: 1
DIAPHORESIS: 0
CONSTIPATION: 0
TREMORS: 0
PND: 0
EYE PAIN: 0
CLAUDICATION: 0
PHOTOPHOBIA: 0
SORE THROAT: 0
NECK PAIN: 0
BRUISES/BLEEDS EASILY: 0
HEARTBURN: 0
FALLS: 0
CHILLS: 0
FEVER: 0
FLANK PAIN: 0
WEAKNESS: 0
ORTHOPNEA: 0
SPUTUM PRODUCTION: 0
WHEEZING: 0

## 2020-06-26 ASSESSMENT — FIBROSIS 4 INDEX: FIB4 SCORE: 1.87

## 2020-06-26 NOTE — ED NOTES
Patient sleeping in bed, RR even and unlabored. NAD. Will continue to monitor.     1:1 sitter at pt bedside.

## 2020-06-26 NOTE — H&P
Hospital Medicine History & Physical Note    Date of Service  6/26/2020    Primary Care Physician  Pcp Pt States None    Code Status  Full Code    Chief Complaint  Chief Complaint   Patient presents with   • Suicidal Ideation   • Detox       History of Presenting Illness  45 y.o. female who presented 6/26/2020 with past medical history of bipolar, seizure disorder, hypothyroidism, hypertension who comes into the emergency room with side ideation.  Patient recently lost her daughter 2 weeks ago.  Since then she has been drinking 1 gallon of alcohol a day.  Associated with significant onset of nausea, vomiting, diarrhea for the past 1 week.  Patient states that she is been vomiting more than 4 times a day and having diarrhea more than 3 times a day.  Patient does have some tremors headaches but denies hallucinations or seizures.  Any she also complains of upper epigastric abdominal pain, nonradiating, non-positional or exertional.  The GI cocktail had resolved her abdominal pain.  She denies any bloody stools, fever, cough, shortness of breath.  Patient does have plans to hurt herself including overdosing on propanolol pills.    CT scan of the abdomen found hepatic steatosis without evidence of pancreatitis    Review of Systems  Review of Systems   Constitutional: Negative for chills, diaphoresis, fever and malaise/fatigue.   HENT: Negative for congestion, ear discharge, ear pain, hearing loss, nosebleeds, sinus pain, sore throat and tinnitus.    Eyes: Negative for blurred vision, double vision, photophobia and pain.   Respiratory: Negative for cough, hemoptysis, sputum production, shortness of breath, wheezing and stridor.    Cardiovascular: Negative for chest pain, palpitations, orthopnea, claudication, leg swelling and PND.   Gastrointestinal: Positive for abdominal pain, diarrhea, nausea and vomiting. Negative for blood in stool, constipation, heartburn and melena.   Genitourinary: Negative for dysuria, flank  "pain, frequency, hematuria and urgency.   Musculoskeletal: Negative for back pain, falls, joint pain, myalgias and neck pain.   Skin: Negative for itching and rash.   Neurological: Positive for dizziness. Negative for tingling, tremors, weakness and headaches.   Endo/Heme/Allergies: Negative for environmental allergies and polydipsia. Does not bruise/bleed easily.   Psychiatric/Behavioral: Positive for substance abuse and suicidal ideas. Negative for depression and hallucinations.       Past Medical History   has a past medical history of ADHD (attention deficit hyperactivity disorder), Bipolar affective disorder ([facility]), Cancer ([facility]), Congestive heart failure ([facility]), Hypertension, Psychiatric disorder, Seizure disorder ([facility]), and Thyroid condition.    Surgical History   has a past surgical history that includes tonsillectomy and thyroidectomy.     Family History  Family history is reviewed and nonpertinent    Social History   reports that she has been smoking cigarettes. She has been smoking about 0.50 packs per day. She has never used smokeless tobacco. She reports current alcohol use. She reports current drug use. Drug: Inhaled.    Allergies  Allergies   Allergen Reactions   • Aspirin Anaphylaxis   • Compazine Swelling   • Sulfa Drugs Rash   • Tetracyclines Swelling   • Ultram [Tramadol Hcl] Swelling   • Food      \"Green Peppers\"       Medications  Prior to Admission Medications   Prescriptions Last Dose Informant Patient Reported? Taking?   divalproex ER (DEPAKOTE ER) 500 MG TABLET SR 24 HR UNK at [facility] Patient's Home Pharmacy Yes Yes   Sig: Take 1,000 mg by mouth every bedtime.   gabapentin (NEURONTIN) 300 MG Cap UNK at [facility] Patient's Home Pharmacy Yes Yes   Sig: Take 300 mg by mouth 3 times a day.   levothyroxine (SYNTHROID) 75 MCG Tab UNK at [facility] Patient's Home Pharmacy Yes No   Sig: Take 75 mcg by mouth Every morning on an empty stomach.   olanzapine (ZYPREXA) 10 MG tablet UNK at [facility] Patient's Home Pharmacy Yes No "   Sig: Take 10 mg by mouth every bedtime.   omeprazole (PRILOSEC) 20 MG delayed-release capsule UNK at [facility] Patient's Home Pharmacy Yes Yes   Sig: Take 20 mg by mouth 2 times a day.   propranolol (INDERAL) 10 MG Tab UNK at [facility] Patient's Home Pharmacy Yes Yes   Sig: Take 10 mg by mouth 2 times a day.      Facility-Administered Medications: None       Physical Exam  Temp:  [36.8 °C (98.2 °F)-37.4 °C (99.3 °F)] 36.8 °C (98.2 °F)  Pulse:  [] 99  Resp:  [15-18] 18  BP: (110-128)/(57-82) 114/74  SpO2:  [95 %-97 %] 95 %    Physical Exam  Vitals signs and nursing note reviewed.   Constitutional:       General: She is not in acute distress.     Appearance: Normal appearance. She is not ill-appearing, toxic-appearing or diaphoretic.   HENT:      Head: Normocephalic and atraumatic.      Nose: No congestion or rhinorrhea.      Mouth/Throat:      Pharynx: No oropharyngeal exudate or posterior oropharyngeal erythema.   Eyes:      General: No scleral icterus.  Neck:      Musculoskeletal: No neck rigidity or muscular tenderness.      Vascular: No carotid bruit.   Cardiovascular:      Rate and Rhythm: Normal rate and regular rhythm.      Pulses: Normal pulses.      Heart sounds: Normal heart sounds. No murmur. No friction rub. No gallop.    Pulmonary:      Effort: Pulmonary effort is normal. No respiratory distress.      Breath sounds: Normal breath sounds. No stridor. No wheezing or rhonchi.   Abdominal:      General: Abdomen is flat. There is no distension.      Palpations: There is no mass.      Tenderness: There is no abdominal tenderness. There is no left CVA tenderness, guarding or rebound.      Hernia: No hernia is present.   Musculoskeletal: Normal range of motion.         General: No swelling.      Right lower leg: No edema.      Left lower leg: No edema.   Lymphadenopathy:      Cervical: No cervical adenopathy.   Skin:     General: Skin is warm and dry.      Capillary Refill: Capillary refill takes more than 3  seconds.      Coloration: Skin is not jaundiced or pale.      Findings: No bruising or erythema.   Neurological:      Mental Status: She is alert.         Laboratory:  Recent Labs     06/26/20  0850   WBC 7.4   RBC 5.28   HEMOGLOBIN 16.1*   HEMATOCRIT 46.2   MCV 87.5   MCH 30.5   MCHC 34.8   RDW 58.6*   PLATELETCT 101*   MPV 9.6     Recent Labs     06/26/20  0850   SODIUM 141   POTASSIUM 3.8   CHLORIDE 101   CO2 21   GLUCOSE 106*   BUN 6*   CREATININE 0.62   CALCIUM 8.6     Recent Labs     06/26/20  0850   ALTSGPT 51*   ASTSGOT 100*   ALKPHOSPHAT 103*   TBILIRUBIN 0.4   LIPASE 239*   GLUCOSE 106*     Recent Labs     06/26/20  0850   APTT 31.2   INR 0.86*     No results for input(s): NTPROBNP in the last 72 hours.      No results for input(s): TROPONINT in the last 72 hours.    Imaging:  DX-CHEST-PORTABLE (1 VIEW)   Final Result      No acute cardiopulmonary abnormality.      CT-ABDOMEN-PELVIS WITH   Final Result         1. Hepatic steatosis.      2. There is a small sliding hiatal hernia otherwise the remainder of the CT scan of the abdomen and pelvis is unremarkable.            Assessment/Plan:    * Suicide ideation  Assessment & Plan  Patient did have a suicide attempt on 7/2019 propanolol overdose  Patient does have intentions of suicide with propanolol  Legal hold  Psychiatry consult    Manic depressive illness (HCC)- (present on admission)  Assessment & Plan  Continue home Zyprexa    Gastroesophageal reflux disease with esophagitis  Assessment & Plan  Start omeprazole     Essential hypertension- (present on admission)  Assessment & Plan  controlled  Continue current home medications  IV as needed medications have been ordered      Alcohol dependence with intoxication (HCC)- (present on admission)  Assessment & Plan  Patient will be admitted to the telemetry unit with close cardiac monitoring on CIWA protocol  Started on rally bag, multivitamin, thiamine and folate  Replete electrolytes  Patient has been  counseled on alcohol cessation and will be provided with information for outpatient detox facilities      Elevated liver function tests- (present on admission)  Assessment & Plan  Secondary to alcohol abuse  Continue to monitor    Thrombocytopenia (HCC)- (present on admission)  Assessment & Plan  Continue to trend    Hypothyroidism- (present on admission)  Assessment & Plan  Continue home thyroid medication

## 2020-06-26 NOTE — PROGRESS NOTES
Received pt from ED. Legal hold with 1:1 sitter at bedside. Patient A&O x 4. RA. Placed on telemetry monitor 's , IVF's started. Pt is tearful/ grimacing  complains of 10/10 upper abdominal pain will medicate per MAR. All pt belongings labeled and locked in cabinet in case coordination. POC discussed with patient. Pt verbalizes understanding. Call light and belongings with in reach. Bed locked and in lowest position, alarm and fall precautions in place.

## 2020-06-26 NOTE — PROGRESS NOTES
Triage officer progress note    Discussed with Dr. Cobb, 45 years old female with past medical history of alcohol abuse, is coming today due to abdominal pain and suicidal ideations, patient lost her daughter recently and she has been drinking around 1 gallon of alcohol a day, developed abdominal pain, she had elevated lipase, CT abdomen showed fatty liver, patient will need admission, ER physician requested hospitalist group to admit.  Dr. Rahman will be admitting, he has been notified and agreed.  Will require inpatient psychiatry consult
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
## 2020-06-26 NOTE — ED NOTES
Report given to T8 RN. Patient transported to floor with personal belongings. Pt in stable condition.    negative...

## 2020-06-26 NOTE — ED NOTES
Pt resting in bed, RR even and unlabored. Patient A&OX4. NAD. Will continue to monitor. 1:1 sitter at pt bedside.

## 2020-06-26 NOTE — ED PROVIDER NOTES
ED Provider Note    CHIEF COMPLAINT  Chief Complaint   Patient presents with   • Suicidal Ideation   • Detox       HPI  Nancy Olvera is a 45 y.o. female who presents with complaint of suicidal ideation, plan to overdose on propranolol.  She is apparently tried this once previously.  Patient has been feeling suicidal since the death of her daughter 3 weeks ago, apparently killed by a drunk .  She states past history of alcoholism, had been sober for the last year, started drinking heavily with the death of her daughter, estimates up to a gallon of alcohol daily.  She is complained of the last 3 days of an epigastric discomfort.  Patient taken by her family to [place], seen and placed on psychiatric hold, transferred to our emergency department for ongoing care.  No hematemesis, no diarrhea.  Pain in the upper abdomen is burning, cramping, with associated nausea.  She was treated by Zofran in route with little relief, requesting alternative nausea medication.    REVIEW OF SYSTEMS  Constitutional: Denies fever  Respiratory: No shortness of breath  Cardiac: No chest pain or syncope  Gastrointestinal: Gastric pain, nausea  Musculoskeletal: No acute neck or back pain  Neurologic: No headache  Genitourinary: No dysuria  Psychiatric: Suicidal ideation, history of bipolar disorder       All other systems are negative.     PAST MEDICAL HISTORY  Past Medical History:   Diagnosis Date   • ADHD (attention deficit hyperactivity disorder)    • Bipolar affective disorder (HCC)    • Cancer (HCC)     pt states she has colon cancer   • Congestive heart failure (HCC)    • Hypertension    • Psychiatric disorder    • Seizure disorder (HCC)    • Thyroid condition        FAMILY HISTORY  History reviewed. No pertinent family history.    SOCIAL HISTORY  Social History     Socioeconomic History   • Marital status:      Spouse name: Not on file   • Number of children: Not on file   • Years of education: Not on file   •  "Highest education level: Not on file   Occupational History   • Not on file   Social Needs   • Financial resource strain: Not on file   • Food insecurity     Worry: Not on file     Inability: Not on file   • Transportation needs     Medical: Not on file     Non-medical: Not on file   Tobacco Use   • Smoking status: Current Every Day Smoker     Packs/day: 0.50     Types: Cigarettes   • Smokeless tobacco: Never Used   Substance and Sexual Activity   • Alcohol use: Yes     Binge frequency: Daily or almost daily     Comment: 1/5th of vodka a day   • Drug use: Yes     Types: Inhaled     Comment: meth \"pt states  put meth in her coffee\"    • Sexual activity: Not on file   Lifestyle   • Physical activity     Days per week: Not on file     Minutes per session: Not on file   • Stress: Not on file   Relationships   • Social connections     Talks on phone: Not on file     Gets together: Not on file     Attends [employer] service: Not on file     Active member of club or organization: Not on file     Attends meetings of clubs or organizations: Not on file     Relationship status: Not on file   • Intimate partner violence     Fear of current or ex partner: Not on file     Emotionally abused: Not on file     Physically abused: Not on file     Forced sexual activity: Not on file   Other Topics Concern   • Not on file   Social History Narrative   • Not on file       SURGICAL HISTORY  Past Surgical History:   Procedure Laterality Date   • THYROIDECTOMY     • TONSILLECTOMY         CURRENT MEDICATIONS  Home Medications     Reviewed by Lindsay Monge R.N. (Registered Nurse) on 06/26/20 at 0846  Med List Status: Unable to Obtain   Medication Last Dose Status   ibuprofen (MOTRIN) 200 MG Tab  Active   levothyroxine (SYNTHROID) 75 MCG Tab 6/9/2020 Active   olanzapine (ZYPREXA) 10 MG tablet  Active   propranolol (INDERAL) 10 MG Tab  Active                ALLERGIES  Allergies   Allergen Reactions   • Aspirin Anaphylaxis   • " "Compazine Swelling   • Sulfa Drugs Rash   • Tetracyclines Swelling   • Ultram [Tramadol Hcl] Swelling   • Food      \"Green Peppers\"       PHYSICAL EXAM  VITAL SIGNS: /82   Pulse (!) 105   Temp 37.4 °C (99.3 °F) (Temporal)   Resp 17   Ht 1.753 m (5' 9\")   Wt 68.9 kg (152 lb)   SpO2 97%   BMI 22.45 kg/m²   Constitutional: Well-nourished  ENT: Nares clear, mucous membranes dry.  Eyes:  Conjunctiva normal, No discharge.    Lymphatic: No adenopathy.   Cardiovascular: Tachycardic heart rate, Normal rhythm.   Pulmonary: No wheezing, no rales  Gastrointestinal: Soft, epigastric tenderness without guarding.  No pulsatile mass  Skin: Warm, Dry, No jaundice.   Musculoskeletal:  No CVA tenderness.   Neurologic:  Normal motor and sensory function, No focal deficits noted.   Psychiatric: Flat affect, depressed mood.    RADIOLOGY/PROCEDURES/Labs  CT-ABDOMEN-PELVIS WITH   Final Result         1. Hepatic steatosis.      2. There is a small sliding hiatal hernia otherwise the remainder of the CT scan of the abdomen and pelvis is unremarkable.        Results for orders placed or performed during the hospital encounter of 06/26/20   CBC WITH DIFFERENTIAL   Result Value Ref Range    WBC 7.4 4.8 - 10.8 K/uL    RBC 5.28 4.20 - 5.40 M/uL    Hemoglobin 16.1 (H) 12.0 - 16.0 g/dL    Hematocrit 46.2 37.0 - 47.0 %    MCV 87.5 81.4 - 97.8 fL    MCH 30.5 27.0 - 33.0 pg    MCHC 34.8 33.6 - 35.0 g/dL    RDW 58.6 (H) 35.9 - 50.0 fL    Platelet Count 101 (L) 164 - 446 K/uL    MPV 9.6 9.0 - 12.9 fL    Neutrophils-Polys 61.80 44.00 - 72.00 %    Lymphocytes 25.70 22.00 - 41.00 %    Monocytes 6.40 0.00 - 13.40 %    Eosinophils 5.30 0.00 - 6.90 %    Basophils 0.50 0.00 - 1.80 %    Immature Granulocytes 0.30 0.00 - 0.90 %    Nucleated RBC 0.00 /100 WBC    Neutrophils (Absolute) 4.56 2.00 - 7.15 K/uL    Lymphs (Absolute) 1.90 1.00 - 4.80 K/uL    Monos (Absolute) 0.47 0.00 - 0.85 K/uL    Eos (Absolute) 0.39 0.00 - 0.51 K/uL    Baso (Absolute) " 0.04 0.00 - 0.12 K/uL    Immature Granulocytes (abs) 0.02 0.00 - 0.11 K/uL    NRBC (Absolute) 0.00 K/uL   COMP METABOLIC PANEL   Result Value Ref Range    Sodium 141 135 - 145 mmol/L    Potassium 3.8 3.6 - 5.5 mmol/L    Chloride 101 96 - 112 mmol/L    Co2 21 20 - 33 mmol/L    Anion Gap 19.0 (H) 7.0 - 16.0    Glucose 106 (H) 65 - 99 mg/dL    Bun 6 (L) 8 - 22 mg/dL    Creatinine 0.62 0.50 - 1.40 mg/dL    Calcium 8.6 8.5 - 10.5 mg/dL    AST(SGOT) 100 (H) 12 - 45 U/L    ALT(SGPT) 51 (H) 2 - 50 U/L    Alkaline Phosphatase 103 (H) 30 - 99 U/L    Total Bilirubin 0.4 0.1 - 1.5 mg/dL    Albumin 3.7 3.2 - 4.9 g/dL    Total Protein 6.6 6.0 - 8.2 g/dL    Globulin 2.9 1.9 - 3.5 g/dL    A-G Ratio 1.3 g/dL   PROTHROMBIN TIME (INR)   Result Value Ref Range    PT 12.0 12.0 - 14.6 sec    INR 0.86 (L) 0.87 - 1.13   APTT   Result Value Ref Range    APTT 31.2 24.7 - 36.0 sec   LIPASE   Result Value Ref Range    Lipase 239 (H) 11 - 82 U/L   DIAGNOSTIC ALCOHOL   Result Value Ref Range    Diagnostic Alcohol 388.6 (H) 0.0 - 10.1 mg/dL   HCG QUAL SERUM   Result Value Ref Range    Beta-Hcg Qualitative Serum Negative Negative   ESTIMATED GFR   Result Value Ref Range    GFR If African American >60 >60 mL/min/1.73 m 2    GFR If Non African American >60 >60 mL/min/1.73 m 2         COURSE & MEDICAL DECISION MAKING  Pertinent Labs & Imaging studies reviewed. (See chart for details)  Patient with evidence of pancreatitis, suspect secondary to alcohol.  She is acutely intoxicated at this time.  With patient's pre-existing bipolar disorder, recent life stressors, specific plan to commit suicide, she is kept on psychiatric hold despite the alcohol intoxication as there is pre-existing disease.  CT scan obtained secondary to evidence of pancreatitis with palpable discomfort, this was negative.  Patient is not clear for transfer to psychiatric facility, plan for medical admission, detox, inpatient psychiatric consultation.  Patient received IV  detoxification fluid for tachycardia, evidence of dehydration.  Patient unable to orally rehydrate secondary to nausea.  Patient stated she was feeling improved with the IV fluids.  Patient also states epigastric discomfort improved after Reglan and GI cocktail.    FINAL IMPRESSION  1. Alcoholism (HCC)     2. Suicidal ideation     3. Alcohol-induced acute pancreatitis, unspecified complication status             Electronically signed by: Marlon Cobb M.D., 6/26/2020 10:14 AM

## 2020-06-26 NOTE — ED NOTES
Meds given per order. Patient awake, lying in bed. RR even and unlabored. Pt denies needs at this time. Will continue to monitor.

## 2020-06-26 NOTE — ED NOTES
Vital signs obtained. Patient updated on inpatient bed assignment. Waiting for T8 RN to transport patient.

## 2020-06-26 NOTE — ED NOTES
Pt disrobed, placed on 1 hospital gown and non-slip socks. Pt given blanket and has 1 flat sheet on bed. All potentially hazardous items removed from room for pt safety. All pt personal belongings put in belongings bags, security called for search and locker placement.

## 2020-06-26 NOTE — ED NOTES
Med rec completed per pt's home pharmacy ([facility])  Allergies reviewed  No PO antibiotics in the last 14 days

## 2020-06-26 NOTE — ED NOTES
Pt given water per request. MD Cobb updating patient. Waiting for inpatient bed assignment. 1:1 sitter at pt bedside.

## 2020-06-26 NOTE — ED NOTES
Pt returned from CT scan. Pt requesting water, educated on NPO status until CT scan results come back. Pt verbalized understanding. Pt sitting in bed, RR even and unlabored. NAD.     1:1 sitter at pt bedside.

## 2020-06-27 LAB
ALBUMIN SERPL BCP-MCNC: 3.1 G/DL (ref 3.2–4.9)
ALBUMIN/GLOB SERPL: 1.5 G/DL
ALP SERPL-CCNC: 92 U/L (ref 30–99)
ALT SERPL-CCNC: 37 U/L (ref 2–50)
ANION GAP SERPL CALC-SCNC: 8 MMOL/L (ref 7–16)
ANISOCYTOSIS BLD QL SMEAR: ABNORMAL
AST SERPL-CCNC: 62 U/L (ref 12–45)
BASO STIPL BLD QL SMEAR: NORMAL
BASOPHILS # BLD AUTO: 0.2 % (ref 0–1.8)
BASOPHILS # BLD: 0.01 K/UL (ref 0–0.12)
BILIRUB SERPL-MCNC: 0.8 MG/DL (ref 0.1–1.5)
BUN SERPL-MCNC: 9 MG/DL (ref 8–22)
CALCIUM SERPL-MCNC: 8.2 MG/DL (ref 8.5–10.5)
CHLORIDE SERPL-SCNC: 102 MMOL/L (ref 96–112)
CHOLEST SERPL-MCNC: 141 MG/DL (ref 100–199)
CO2 SERPL-SCNC: 26 MMOL/L (ref 20–33)
COMMENT 1642: NORMAL
CREAT SERPL-MCNC: 0.69 MG/DL (ref 0.5–1.4)
EOSINOPHIL # BLD AUTO: 0.11 K/UL (ref 0–0.51)
EOSINOPHIL NFR BLD: 2.2 % (ref 0–6.9)
ERYTHROCYTE [DISTWIDTH] IN BLOOD BY AUTOMATED COUNT: 61.9 FL (ref 35.9–50)
GLOBULIN SER CALC-MCNC: 2.1 G/DL (ref 1.9–3.5)
GLUCOSE SERPL-MCNC: 98 MG/DL (ref 65–99)
HCT VFR BLD AUTO: 41.7 % (ref 37–47)
HDLC SERPL-MCNC: 100 MG/DL
HGB BLD-MCNC: 14 G/DL (ref 12–16)
IMM GRANULOCYTES # BLD AUTO: 0.01 K/UL (ref 0–0.11)
IMM GRANULOCYTES NFR BLD AUTO: 0.2 % (ref 0–0.9)
LDLC SERPL CALC-MCNC: 34 MG/DL
LIPASE SERPL-CCNC: 104 U/L (ref 11–82)
LYMPHOCYTES # BLD AUTO: 1.29 K/UL (ref 1–4.8)
LYMPHOCYTES NFR BLD: 25.9 % (ref 22–41)
MACROCYTES BLD QL SMEAR: ABNORMAL
MAGNESIUM SERPL-MCNC: 1.7 MG/DL (ref 1.5–2.5)
MCH RBC QN AUTO: 30.8 PG (ref 27–33)
MCHC RBC AUTO-ENTMCNC: 33.6 G/DL (ref 33.6–35)
MCV RBC AUTO: 91.9 FL (ref 81.4–97.8)
MONOCYTES # BLD AUTO: 0.35 K/UL (ref 0–0.85)
MONOCYTES NFR BLD AUTO: 7 % (ref 0–13.4)
MORPHOLOGY BLD-IMP: NORMAL
NEUTROPHILS # BLD AUTO: 3.21 K/UL (ref 2–7.15)
NEUTROPHILS NFR BLD: 64.5 % (ref 44–72)
NRBC # BLD AUTO: 0 K/UL
NRBC BLD-RTO: 0 /100 WBC
OVALOCYTES BLD QL SMEAR: NORMAL
PHOSPHATE SERPL-MCNC: 3.1 MG/DL (ref 2.5–4.5)
PLATELET # BLD AUTO: 52 K/UL (ref 164–446)
PLATELET BLD QL SMEAR: NORMAL
PLATELETS.RETICULATED NFR BLD AUTO: 4.4 K/UL (ref 0.6–13.1)
PMV BLD AUTO: 10.1 FL (ref 9–12.9)
POIKILOCYTOSIS BLD QL SMEAR: NORMAL
POLYCHROMASIA BLD QL SMEAR: NORMAL
POTASSIUM SERPL-SCNC: 4.1 MMOL/L (ref 3.6–5.5)
PROT SERPL-MCNC: 5.2 G/DL (ref 6–8.2)
RBC # BLD AUTO: 4.54 M/UL (ref 4.2–5.4)
RBC BLD AUTO: PRESENT
SODIUM SERPL-SCNC: 136 MMOL/L (ref 135–145)
TRIGL SERPL-MCNC: 35 MG/DL (ref 0–149)
TSH SERPL DL<=0.005 MIU/L-ACNC: 2.04 UIU/ML (ref 0.38–5.33)
WBC # BLD AUTO: 5 K/UL (ref 4.8–10.8)

## 2020-06-27 PROCEDURE — 80061 LIPID PANEL: CPT

## 2020-06-27 PROCEDURE — 83690 ASSAY OF LIPASE: CPT

## 2020-06-27 PROCEDURE — 36415 COLL VENOUS BLD VENIPUNCTURE: CPT

## 2020-06-27 PROCEDURE — A9270 NON-COVERED ITEM OR SERVICE: HCPCS | Performed by: INTERNAL MEDICINE

## 2020-06-27 PROCEDURE — 700102 HCHG RX REV CODE 250 W/ 637 OVERRIDE(OP): Performed by: INTERNAL MEDICINE

## 2020-06-27 PROCEDURE — 700111 HCHG RX REV CODE 636 W/ 250 OVERRIDE (IP): Performed by: HOSPITALIST

## 2020-06-27 PROCEDURE — 84443 ASSAY THYROID STIM HORMONE: CPT

## 2020-06-27 PROCEDURE — 700102 HCHG RX REV CODE 250 W/ 637 OVERRIDE(OP): Performed by: HOSPITALIST

## 2020-06-27 PROCEDURE — A9270 NON-COVERED ITEM OR SERVICE: HCPCS | Performed by: HOSPITALIST

## 2020-06-27 PROCEDURE — 99223 1ST HOSP IP/OBS HIGH 75: CPT | Performed by: PSYCHIATRY & NEUROLOGY

## 2020-06-27 PROCEDURE — 770020 HCHG ROOM/CARE - TELE (206)

## 2020-06-27 PROCEDURE — 85025 COMPLETE CBC W/AUTO DIFF WBC: CPT

## 2020-06-27 PROCEDURE — 84100 ASSAY OF PHOSPHORUS: CPT

## 2020-06-27 PROCEDURE — 700105 HCHG RX REV CODE 258: Performed by: HOSPITALIST

## 2020-06-27 PROCEDURE — 80053 COMPREHEN METABOLIC PANEL: CPT

## 2020-06-27 PROCEDURE — 99233 SBSQ HOSP IP/OBS HIGH 50: CPT | Performed by: INTERNAL MEDICINE

## 2020-06-27 PROCEDURE — 83735 ASSAY OF MAGNESIUM: CPT

## 2020-06-27 PROCEDURE — 85055 RETICULATED PLATELET ASSAY: CPT

## 2020-06-27 RX ADMIN — GABAPENTIN 300 MG: 300 CAPSULE ORAL at 05:11

## 2020-06-27 RX ADMIN — GABAPENTIN 300 MG: 300 CAPSULE ORAL at 13:06

## 2020-06-27 RX ADMIN — LORAZEPAM 2 MG: 2 TABLET ORAL at 23:28

## 2020-06-27 RX ADMIN — SODIUM CHLORIDE, POTASSIUM CHLORIDE, SODIUM LACTATE AND CALCIUM CHLORIDE: 600; 310; 30; 20 INJECTION, SOLUTION INTRAVENOUS at 23:28

## 2020-06-27 RX ADMIN — LEVOTHYROXINE SODIUM 75 MCG: 0.07 TABLET ORAL at 05:11

## 2020-06-27 RX ADMIN — LORAZEPAM 1.5 MG: 2 INJECTION INTRAMUSCULAR; INTRAVENOUS at 08:48

## 2020-06-27 RX ADMIN — OXYCODONE 5 MG: 5 TABLET ORAL at 05:11

## 2020-06-27 RX ADMIN — Medication 400 MG: at 13:06

## 2020-06-27 RX ADMIN — THERA TABS 1 TABLET: TAB at 05:11

## 2020-06-27 RX ADMIN — CHLORDIAZEPOXIDE HYDROCHLORIDE 25 MG: 25 CAPSULE ORAL at 13:06

## 2020-06-27 RX ADMIN — GABAPENTIN 300 MG: 300 CAPSULE ORAL at 20:38

## 2020-06-27 RX ADMIN — OMEPRAZOLE 20 MG: 20 CAPSULE, DELAYED RELEASE ORAL at 05:11

## 2020-06-27 RX ADMIN — LORAZEPAM 1.5 MG: 2 INJECTION INTRAMUSCULAR; INTRAVENOUS at 10:43

## 2020-06-27 RX ADMIN — DIVALPROEX SODIUM 1000 MG: 500 TABLET, EXTENDED RELEASE ORAL at 20:38

## 2020-06-27 RX ADMIN — OXYCODONE 5 MG: 5 TABLET ORAL at 14:39

## 2020-06-27 RX ADMIN — CHLORDIAZEPOXIDE HYDROCHLORIDE 25 MG: 25 CAPSULE ORAL at 23:28

## 2020-06-27 RX ADMIN — OXYCODONE 5 MG: 5 TABLET ORAL at 19:14

## 2020-06-27 RX ADMIN — LORAZEPAM 1 MG: 1 TABLET ORAL at 05:11

## 2020-06-27 RX ADMIN — SODIUM CHLORIDE, POTASSIUM CHLORIDE, SODIUM LACTATE AND CALCIUM CHLORIDE: 600; 310; 30; 20 INJECTION, SOLUTION INTRAVENOUS at 07:30

## 2020-06-27 RX ADMIN — LORAZEPAM 1.5 MG: 2 INJECTION INTRAMUSCULAR; INTRAVENOUS at 19:14

## 2020-06-27 RX ADMIN — LORAZEPAM 1 MG: 2 INJECTION INTRAMUSCULAR; INTRAVENOUS at 15:59

## 2020-06-27 RX ADMIN — OLANZAPINE 10 MG: 5 TABLET, FILM COATED ORAL at 20:38

## 2020-06-27 RX ADMIN — PROPRANOLOL HYDROCHLORIDE 10 MG: 20 TABLET ORAL at 05:11

## 2020-06-27 RX ADMIN — OMEPRAZOLE 20 MG: 20 CAPSULE, DELAYED RELEASE ORAL at 18:29

## 2020-06-27 RX ADMIN — CHLORDIAZEPOXIDE HYDROCHLORIDE 50 MG: 25 CAPSULE ORAL at 05:11

## 2020-06-27 RX ADMIN — OXYCODONE 5 MG: 5 TABLET ORAL at 09:32

## 2020-06-27 RX ADMIN — Medication 100 MG: at 05:12

## 2020-06-27 RX ADMIN — ACETAMINOPHEN 650 MG: 325 TABLET, FILM COATED ORAL at 22:15

## 2020-06-27 RX ADMIN — LORAZEPAM 1 MG: 2 INJECTION INTRAMUSCULAR; INTRAVENOUS at 18:28

## 2020-06-27 RX ADMIN — FOLIC ACID 1 MG: 1 TABLET ORAL at 05:11

## 2020-06-27 RX ADMIN — LORAZEPAM 2 MG: 2 TABLET ORAL at 00:00

## 2020-06-27 RX ADMIN — LORAZEPAM 1 MG: 2 INJECTION INTRAMUSCULAR; INTRAVENOUS at 13:13

## 2020-06-27 RX ADMIN — CHLORDIAZEPOXIDE HYDROCHLORIDE 25 MG: 25 CAPSULE ORAL at 18:29

## 2020-06-27 ASSESSMENT — LIFESTYLE VARIABLES
TOTAL SCORE: VERY MILD ITCHING, PINS AND NEEDLES SENSATION, BURNING OR NUMBNESS
TREMOR: TREMOR NOT VISIBLE BUT CAN BE FELT, FINGERTIP TO FINGERTIP
TOTAL SCORE: 4
HEADACHE, FULLNESS IN HEAD: VERY MILD
AUDITORY DISTURBANCES: NOT PRESENT
AGITATION: SOMEWHAT MORE THAN NORMAL ACTIVITY
AGITATION: SOMEWHAT MORE THAN NORMAL ACTIVITY
HEADACHE, FULLNESS IN HEAD: VERY MILD
TREMOR: MODERATE TREMOR WITH ARMS EXTENDED
VISUAL DISTURBANCES: NOT PRESENT
TOTAL SCORE: VERY MILD ITCHING, PINS AND NEEDLES SENSATION, BURNING OR NUMBNESS
AUDITORY DISTURBANCES: NOT PRESENT
TOTAL SCORE: VERY MILD ITCHING, PINS AND NEEDLES SENSATION, BURNING OR NUMBNESS
NAUSEA AND VOMITING: INTERMITTENT NAUSEA WITH DRY HEAVES
ANXIETY: *
VISUAL DISTURBANCES: VERY MILD SENSITIVITY
TOTAL SCORE: 13
NAUSEA AND VOMITING: NO NAUSEA AND NO VOMITING
TOTAL SCORE: 12
ANXIETY: *
NAUSEA AND VOMITING: INTERMITTENT NAUSEA WITH DRY HEAVES
NAUSEA AND VOMITING: *
AGITATION: *
TOTAL SCORE: 13
PAROXYSMAL SWEATS: BARELY PERCEPTIBLE SWEATING. PALMS MOIST
ANXIETY: *
ANXIETY: *
ORIENTATION AND CLOUDING OF SENSORIUM: ORIENTED AND CAN DO SERIAL ADDITIONS
VISUAL DISTURBANCES: VERY MILD SENSITIVITY
TREMOR: TREMOR NOT VISIBLE BUT CAN BE FELT, FINGERTIP TO FINGERTIP
TOTAL SCORE: VERY MILD ITCHING, PINS AND NEEDLES SENSATION, BURNING OR NUMBNESS
PAROXYSMAL SWEATS: BARELY PERCEPTIBLE SWEATING. PALMS MOIST
AUDITORY DISTURBANCES: VERY MILD HARSHNESS OR ABILITY TO FRIGHTEN
AGITATION: SOMEWHAT MORE THAN NORMAL ACTIVITY
VISUAL DISTURBANCES: NOT PRESENT
TREMOR: NO TREMOR
TREMOR: TREMOR NOT VISIBLE BUT CAN BE FELT, FINGERTIP TO FINGERTIP
PAROXYSMAL SWEATS: *
ORIENTATION AND CLOUDING OF SENSORIUM: ORIENTED AND CAN DO SERIAL ADDITIONS
AUDITORY DISTURBANCES: VERY MILD HARSHNESS OR ABILITY TO FRIGHTEN
AGITATION: *
ORIENTATION AND CLOUDING OF SENSORIUM: ORIENTED AND CAN DO SERIAL ADDITIONS
TOTAL SCORE: 21
ORIENTATION AND CLOUDING OF SENSORIUM: ORIENTED AND CAN DO SERIAL ADDITIONS
HEADACHE, FULLNESS IN HEAD: VERY MILD
VISUAL DISTURBANCES: VERY MILD SENSITIVITY
NAUSEA AND VOMITING: INTERMITTENT NAUSEA WITH DRY HEAVES
HEADACHE, FULLNESS IN HEAD: NOT PRESENT
VISUAL DISTURBANCES: VERY MILD SENSITIVITY
NAUSEA AND VOMITING: MILD NAUSEA WITH NO VOMITING
TOTAL SCORE: 10
AGITATION: NORMAL ACTIVITY
PAROXYSMAL SWEATS: BARELY PERCEPTIBLE SWEATING. PALMS MOIST
PAROXYSMAL SWEATS: BARELY PERCEPTIBLE SWEATING. PALMS MOIST
AUDITORY DISTURBANCES: VERY MILD HARSHNESS OR ABILITY TO FRIGHTEN
ANXIETY: MODERATELY ANXIOUS OR GUARDED, SO ANXIETY IS INFERRED
AUDITORY DISTURBANCES: VERY MILD HARSHNESS OR ABILITY TO FRIGHTEN
ANXIETY: MODERATELY ANXIOUS OR GUARDED, SO ANXIETY IS INFERRED
ORIENTATION AND CLOUDING OF SENSORIUM: ORIENTED AND CAN DO SERIAL ADDITIONS
ANXIETY: *
ORIENTATION AND CLOUDING OF SENSORIUM: ORIENTED AND CAN DO SERIAL ADDITIONS
AUDITORY DISTURBANCES: VERY MILD HARSHNESS OR ABILITY TO FRIGHTEN
TOTAL SCORE: 20
VISUAL DISTURBANCES: VERY MILD SENSITIVITY
PAROXYSMAL SWEATS: *
TOTAL SCORE: VERY MILD ITCHING, PINS AND NEEDLES SENSATION, BURNING OR NUMBNESS
HEADACHE, FULLNESS IN HEAD: MILD
TOTAL SCORE: VERY MILD ITCHING, PINS AND NEEDLES SENSATION, BURNING OR NUMBNESS
PAROXYSMAL SWEATS: *
HEADACHE, FULLNESS IN HEAD: MILD
TOTAL SCORE: 22
AGITATION: MODERATELY FIDGETY AND RESTLESS
ORIENTATION AND CLOUDING OF SENSORIUM: ORIENTED AND CAN DO SERIAL ADDITIONS
TREMOR: *
TREMOR: TREMOR NOT VISIBLE BUT CAN BE FELT, FINGERTIP TO FINGERTIP
ORIENTATION AND CLOUDING OF SENSORIUM: ORIENTED AND CAN DO SERIAL ADDITIONS
TREMOR: TREMOR NOT VISIBLE BUT CAN BE FELT, FINGERTIP TO FINGERTIP
HEADACHE, FULLNESS IN HEAD: NOT PRESENT
HEADACHE, FULLNESS IN HEAD: VERY MILD
VISUAL DISTURBANCES: NOT PRESENT
AUDITORY DISTURBANCES: VERY MILD HARSHNESS OR ABILITY TO FRIGHTEN
PAROXYSMAL SWEATS: BARELY PERCEPTIBLE SWEATING. PALMS MOIST
PAROXYSMAL SWEATS: BARELY PERCEPTIBLE SWEATING. PALMS MOIST
NAUSEA AND VOMITING: INTERMITTENT NAUSEA WITH DRY HEAVES
VISUAL DISTURBANCES: VERY MILD SENSITIVITY
AUDITORY DISTURBANCES: VERY MILD HARSHNESS OR ABILITY TO FRIGHTEN
ANXIETY: MILDLY ANXIOUS
TOTAL SCORE: 5
AGITATION: SOMEWHAT MORE THAN NORMAL ACTIVITY
ORIENTATION AND CLOUDING OF SENSORIUM: ORIENTED AND CAN DO SERIAL ADDITIONS
NAUSEA AND VOMITING: MILD NAUSEA WITH NO VOMITING
ANXIETY: *
NAUSEA AND VOMITING: MILD NAUSEA WITH NO VOMITING
AGITATION: SOMEWHAT MORE THAN NORMAL ACTIVITY
TREMOR: TREMOR NOT VISIBLE BUT CAN BE FELT, FINGERTIP TO FINGERTIP
HEADACHE, FULLNESS IN HEAD: VERY MILD
TOTAL SCORE: VERY MILD ITCHING, PINS AND NEEDLES SENSATION, BURNING OR NUMBNESS

## 2020-06-27 ASSESSMENT — ENCOUNTER SYMPTOMS
CHILLS: 0
DEPRESSION: 0
DIARRHEA: 1
NAUSEA: 0
BLURRED VISION: 0
FALLS: 0
PALPITATIONS: 0
VOMITING: 0
ABDOMINAL PAIN: 1
SORE THROAT: 0
NERVOUS/ANXIOUS: 0
TREMORS: 1
WEAKNESS: 0
COUGH: 0
CONSTIPATION: 0
HEADACHES: 0
SHORTNESS OF BREATH: 0
DIZZINESS: 0
FEVER: 1

## 2020-06-27 NOTE — PROGRESS NOTES
Hospital Medicine Daily Progress Note    Date of Service  6/27/2020    Chief Complaint  45 y.o. female admitted 6/26/2020 with suicidal ideations    Hospital Course    Ms. Nancy Olvera is a 45 y.o. female history of bipolar, seizure disorder, hypothyroid, hypertension who presented on 6/26/2020 with a suicidal ideations.  Patient lost her daughter 2 weeks ago and since then has been drinking 1 gallon of alcohol a day.  Also endorses nausea, vomiting, diarrhea x1 week.  Also reports upper epigastric abdominal pain, nonradiating.  On admission a GI cocktail resolved abdominal pain.  CT abdomen pelvis demonstrated hepatic steatosis without pancreatitis.  Urine drug screen positive for cannabinoids and amphetamines.  Psychiatry was consulted for suicidal ideations.        Interval Problem Update  Patient was seen and examined at bedside.  I have personally reviewed vitals, labs, and imaging.    6/27.  Afebrile.  Tachycardiac.  On room air.  Patient reports abdominal pain is well controlled.  She does endorse diarrhea but no blood.  Denies chills, sweats, chest pain, shortness of breath.  Does report fevers.  Tremors are better.  Started on diet.    Consultants/Specialty  Psychiatry    Code Status  Full    Disposition  Legal hold    Review of Systems  Review of Systems   Constitutional: Positive for fever. Negative for chills.   HENT: Negative for congestion and sore throat.    Eyes: Negative for blurred vision.   Respiratory: Negative for cough and shortness of breath.    Cardiovascular: Negative for chest pain, palpitations and leg swelling.   Gastrointestinal: Positive for abdominal pain and diarrhea. Negative for constipation, nausea and vomiting.   Genitourinary: Negative for dysuria, frequency and urgency.   Musculoskeletal: Negative for falls.   Skin: Negative for rash.   Neurological: Positive for tremors. Negative for dizziness, weakness and headaches.   Psychiatric/Behavioral: Negative for depression. The  patient is not nervous/anxious.    All other systems reviewed and are negative.       Physical Exam  Temp:  [36.8 °C (98.2 °F)-37.3 °C (99.2 °F)] 36.9 °C (98.5 °F)  Pulse:  [] 95  Resp:  [15-18] 17  BP: (110-128)/(57-82) 119/78  SpO2:  [94 %-97 %] 96 %    Physical Exam  Vitals signs and nursing note reviewed.   Constitutional:       General: She is not in acute distress.     Appearance: Normal appearance. She is normal weight.   HENT:      Head: Normocephalic and atraumatic.      Right Ear: External ear normal.      Left Ear: External ear normal.      Nose: Nose normal.      Mouth/Throat:      Mouth: Mucous membranes are moist.      Pharynx: Oropharynx is clear. No oropharyngeal exudate or posterior oropharyngeal erythema.   Eyes:      Extraocular Movements: Extraocular movements intact.      Conjunctiva/sclera: Conjunctivae normal.   Neck:      Musculoskeletal: Normal range of motion and neck supple.   Cardiovascular:      Rate and Rhythm: Normal rate and regular rhythm.      Pulses: Normal pulses.      Heart sounds: Normal heart sounds. No murmur.   Pulmonary:      Effort: Pulmonary effort is normal. No respiratory distress.      Breath sounds: Normal breath sounds. No stridor. No wheezing or rales.   Abdominal:      General: Abdomen is flat. Bowel sounds are normal. There is no distension.      Palpations: Abdomen is soft. There is no mass.      Tenderness: There is abdominal tenderness.   Musculoskeletal: Normal range of motion.   Skin:     General: Skin is warm.      Capillary Refill: Capillary refill takes less than 2 seconds.   Neurological:      General: No focal deficit present.      Mental Status: She is alert and oriented to person, place, and time. Mental status is at baseline.      Cranial Nerves: No cranial nerve deficit.   Psychiatric:         Behavior: Behavior normal.      Comments: depressed mood.         Fluids    Intake/Output Summary (Last 24 hours) at 6/27/2020 1107  Last data filed at  6/27/2020 0530  Gross per 24 hour   Intake 270 ml   Output --   Net 270 ml       Laboratory  Recent Labs     06/26/20  0850 06/27/20  0328   WBC 7.4 5.0   RBC 5.28 4.54   HEMOGLOBIN 16.1* 14.0   HEMATOCRIT 46.2 41.7   MCV 87.5 91.9   MCH 30.5 30.8   MCHC 34.8 33.6   RDW 58.6* 61.9*   PLATELETCT 101* 52*   MPV 9.6 10.1     Recent Labs     06/26/20  0850 06/27/20  0227   SODIUM 141 136   POTASSIUM 3.8 4.1   CHLORIDE 101 102   CO2 21 26   GLUCOSE 106* 98   BUN 6* 9   CREATININE 0.62 0.69   CALCIUM 8.6 8.2*     Recent Labs     06/26/20  0850   APTT 31.2   INR 0.86*         Recent Labs     06/27/20  0227   TRIGLYCERIDE 35      LDL 34       Imaging  DX-CHEST-PORTABLE (1 VIEW)   Final Result      No acute cardiopulmonary abnormality.      CT-ABDOMEN-PELVIS WITH   Final Result         1. Hepatic steatosis.      2. There is a small sliding hiatal hernia otherwise the remainder of the CT scan of the abdomen and pelvis is unremarkable.           Assessment/Plan  * Suicide ideation- (present on admission)  Assessment & Plan  Patient did have a suicide attempt on 7/2019 propanolol overdose  Patient does have intentions of suicide with propanolol  Legal hold  Psychiatry consult    Manic depressive illness (HCC)- (present on admission)  Assessment & Plan  Continue home Zyprexa    Gastroesophageal reflux disease with esophagitis  Assessment & Plan  Start omeprazole     Essential hypertension- (present on admission)  Assessment & Plan  controlled  Continue current home propanolol  IV as needed medications have been ordered      Alcohol dependence with intoxication (HCC)- (present on admission)  Assessment & Plan  Patient will be admitted to the telemetry unit with close cardiac monitoring on CIWA protocol  Started on rally bag, multivitamin, thiamine and folate  Replete electrolytes  Patient has been counseled on alcohol cessation and will be provided with information for outpatient detox facilities      Elevated liver  function tests- (present on admission)  Assessment & Plan  Secondary to alcohol abuse  Continue to monitor    Thrombocytopenia (HCC)- (present on admission)  Assessment & Plan  Continue to trend    Hypothyroidism- (present on admission)  Assessment & Plan  Continue home thyroid medication         VTE prophylaxis: SCDs.  Avoid anticoagulation in setting of thrombocytopenia.

## 2020-06-27 NOTE — ASSESSMENT & PLAN NOTE
Patient admitted initally to telemetry on CIWA and required ICU transfer, was on Tele, now on floor as of 7/4 night.  Cont CIWA protocol.  Will DC telemetry monitor today.
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
## 2020-06-27 NOTE — ASSESSMENT & PLAN NOTE
Currently the patient denies suicidal and homicidal ideation  Legal hold   Psychiatry to see the patient

## 2020-06-27 NOTE — CARE PLAN
Problem: Communication  Goal: The ability to communicate needs accurately and effectively will improve  Outcome: PROGRESSING AS EXPECTED   Pt A&Ox4; notifies staff of needs appropriately.    Problem: Safety  Goal: Will remain free from injury  Outcome: PROGRESSING AS EXPECTED   Pt high fall risk; bed alarm in use; pt calls prior to ambulating; 1:1 sitter in place.    Problem: Pain Management  Goal: Pain level will decrease to patient's comfort goal  Outcome: PROGRESSING AS EXPECTED   Pt pain being adequately managed with current pain medication regimen.

## 2020-06-27 NOTE — PROGRESS NOTES
Bedside report received. Legal hold with 1:1 sitter Patient A&O x 4. VS'S. RA. CIWA ranged from 7-21  Pt is extremely  Anxious/ agitated pulling off clothing. Pt states that she is starving and would like to advance her diet. Also complains of diffuse abdominal pain  and headache. IVF's running at 125 ml/hr.  POC discussed with patient. Pt verbalizes understanding. Call light and belongings with in reach. Bed locked and in lowest position, alarm and fall precautions in place.

## 2020-06-27 NOTE — ASSESSMENT & PLAN NOTE
Continue olanzapine, sertraline, divalproex, gabapentin, aripiprazole  Appreciate psychiatric input

## 2020-06-27 NOTE — PROGRESS NOTES
Report received from Khurram. Pt has 1:1 sitter in room. Pt awake in bed A&Ox4; no complaints at this time. POC discussed; all questions answered. Bed locked in lowest position; call light in reach; bed alarm in use.

## 2020-06-28 LAB
ANION GAP SERPL CALC-SCNC: 9 MMOL/L (ref 7–16)
BASOPHILS # BLD AUTO: 0.2 % (ref 0–1.8)
BASOPHILS # BLD: 0.01 K/UL (ref 0–0.12)
BUN SERPL-MCNC: 9 MG/DL (ref 8–22)
CALCIUM SERPL-MCNC: 8.9 MG/DL (ref 8.5–10.5)
CHLORIDE SERPL-SCNC: 102 MMOL/L (ref 96–112)
CO2 SERPL-SCNC: 27 MMOL/L (ref 20–33)
CREAT SERPL-MCNC: 0.67 MG/DL (ref 0.5–1.4)
EOSINOPHIL # BLD AUTO: 0.44 K/UL (ref 0–0.51)
EOSINOPHIL NFR BLD: 10 % (ref 0–6.9)
ERYTHROCYTE [DISTWIDTH] IN BLOOD BY AUTOMATED COUNT: 61.8 FL (ref 35.9–50)
GLUCOSE SERPL-MCNC: 99 MG/DL (ref 65–99)
HCT VFR BLD AUTO: 39.4 % (ref 37–47)
HGB BLD-MCNC: 13 G/DL (ref 12–16)
IMM GRANULOCYTES # BLD AUTO: 0.01 K/UL (ref 0–0.11)
IMM GRANULOCYTES NFR BLD AUTO: 0.2 % (ref 0–0.9)
LYMPHOCYTES # BLD AUTO: 1.33 K/UL (ref 1–4.8)
LYMPHOCYTES NFR BLD: 30.2 % (ref 22–41)
MAGNESIUM SERPL-MCNC: 1.9 MG/DL (ref 1.5–2.5)
MCH RBC QN AUTO: 30.6 PG (ref 27–33)
MCHC RBC AUTO-ENTMCNC: 33 G/DL (ref 33.6–35)
MCV RBC AUTO: 92.7 FL (ref 81.4–97.8)
MONOCYTES # BLD AUTO: 0.21 K/UL (ref 0–0.85)
MONOCYTES NFR BLD AUTO: 4.8 % (ref 0–13.4)
NEUTROPHILS # BLD AUTO: 2.41 K/UL (ref 2–7.15)
NEUTROPHILS NFR BLD: 54.6 % (ref 44–72)
NRBC # BLD AUTO: 0 K/UL
NRBC BLD-RTO: 0 /100 WBC
PHOSPHATE SERPL-MCNC: 3.4 MG/DL (ref 2.5–4.5)
PLATELET # BLD AUTO: 44 K/UL (ref 164–446)
PMV BLD AUTO: 11.3 FL (ref 9–12.9)
POTASSIUM SERPL-SCNC: 4.1 MMOL/L (ref 3.6–5.5)
RBC # BLD AUTO: 4.25 M/UL (ref 4.2–5.4)
SODIUM SERPL-SCNC: 138 MMOL/L (ref 135–145)
WBC # BLD AUTO: 4.4 K/UL (ref 4.8–10.8)

## 2020-06-28 PROCEDURE — A9270 NON-COVERED ITEM OR SERVICE: HCPCS | Performed by: HOSPITALIST

## 2020-06-28 PROCEDURE — 700102 HCHG RX REV CODE 250 W/ 637 OVERRIDE(OP): Performed by: INTERNAL MEDICINE

## 2020-06-28 PROCEDURE — 770001 HCHG ROOM/CARE - MED/SURG/GYN PRIV*

## 2020-06-28 PROCEDURE — A9270 NON-COVERED ITEM OR SERVICE: HCPCS | Performed by: INTERNAL MEDICINE

## 2020-06-28 PROCEDURE — 99232 SBSQ HOSP IP/OBS MODERATE 35: CPT | Performed by: PSYCHIATRY & NEUROLOGY

## 2020-06-28 PROCEDURE — 700111 HCHG RX REV CODE 636 W/ 250 OVERRIDE (IP): Performed by: HOSPITALIST

## 2020-06-28 PROCEDURE — A9270 NON-COVERED ITEM OR SERVICE: HCPCS | Performed by: PSYCHIATRY & NEUROLOGY

## 2020-06-28 PROCEDURE — 80048 BASIC METABOLIC PNL TOTAL CA: CPT

## 2020-06-28 PROCEDURE — 84100 ASSAY OF PHOSPHORUS: CPT

## 2020-06-28 PROCEDURE — 700102 HCHG RX REV CODE 250 W/ 637 OVERRIDE(OP): Performed by: PSYCHIATRY & NEUROLOGY

## 2020-06-28 PROCEDURE — 700102 HCHG RX REV CODE 250 W/ 637 OVERRIDE(OP): Performed by: HOSPITALIST

## 2020-06-28 PROCEDURE — 83735 ASSAY OF MAGNESIUM: CPT

## 2020-06-28 PROCEDURE — 99233 SBSQ HOSP IP/OBS HIGH 50: CPT | Performed by: INTERNAL MEDICINE

## 2020-06-28 PROCEDURE — 85025 COMPLETE CBC W/AUTO DIFF WBC: CPT

## 2020-06-28 RX ORDER — ARIPIPRAZOLE 10 MG/1
10 TABLET ORAL DAILY
Status: DISCONTINUED | OUTPATIENT
Start: 2020-06-28 | End: 2020-07-07 | Stop reason: HOSPADM

## 2020-06-28 RX ORDER — SERTRALINE HYDROCHLORIDE 100 MG/1
50 TABLET, FILM COATED ORAL DAILY
Status: DISCONTINUED | OUTPATIENT
Start: 2020-06-28 | End: 2020-07-07

## 2020-06-28 RX ORDER — DIVALPROEX SODIUM 500 MG/1
500 TABLET, EXTENDED RELEASE ORAL
Status: DISCONTINUED | OUTPATIENT
Start: 2020-06-28 | End: 2020-07-07 | Stop reason: HOSPADM

## 2020-06-28 RX ADMIN — LORAZEPAM 2 MG: 2 TABLET ORAL at 22:20

## 2020-06-28 RX ADMIN — ARIPIPRAZOLE 10 MG: 10 TABLET ORAL at 14:50

## 2020-06-28 RX ADMIN — ONDANSETRON 4 MG: 4 TABLET, ORALLY DISINTEGRATING ORAL at 12:26

## 2020-06-28 RX ADMIN — OXYCODONE 5 MG: 5 TABLET ORAL at 00:18

## 2020-06-28 RX ADMIN — LORAZEPAM 3 MG: 2 TABLET ORAL at 11:00

## 2020-06-28 RX ADMIN — OXYCODONE 5 MG: 5 TABLET ORAL at 04:54

## 2020-06-28 RX ADMIN — OMEPRAZOLE 20 MG: 20 CAPSULE, DELAYED RELEASE ORAL at 04:53

## 2020-06-28 RX ADMIN — LORAZEPAM 1 MG: 1 TABLET ORAL at 04:54

## 2020-06-28 RX ADMIN — LORAZEPAM 3 MG: 2 TABLET ORAL at 12:15

## 2020-06-28 RX ADMIN — GABAPENTIN 300 MG: 300 CAPSULE ORAL at 22:20

## 2020-06-28 RX ADMIN — FOLIC ACID 1 MG: 1 TABLET ORAL at 04:53

## 2020-06-28 RX ADMIN — Medication 400 MG: at 12:15

## 2020-06-28 RX ADMIN — DIVALPROEX SODIUM 500 MG: 500 TABLET, FILM COATED, EXTENDED RELEASE ORAL at 19:53

## 2020-06-28 RX ADMIN — LORAZEPAM 1 MG: 2 INJECTION INTRAMUSCULAR; INTRAVENOUS at 07:08

## 2020-06-28 RX ADMIN — GABAPENTIN 300 MG: 300 CAPSULE ORAL at 04:53

## 2020-06-28 RX ADMIN — ONDANSETRON 4 MG: 4 TABLET, ORALLY DISINTEGRATING ORAL at 09:25

## 2020-06-28 RX ADMIN — ONDANSETRON 4 MG: 2 INJECTION INTRAMUSCULAR; INTRAVENOUS at 04:52

## 2020-06-28 RX ADMIN — OMEPRAZOLE 20 MG: 20 CAPSULE, DELAYED RELEASE ORAL at 19:53

## 2020-06-28 RX ADMIN — OXYCODONE 5 MG: 5 TABLET ORAL at 20:06

## 2020-06-28 RX ADMIN — THERA TABS 1 TABLET: TAB at 04:53

## 2020-06-28 RX ADMIN — CHLORDIAZEPOXIDE HYDROCHLORIDE 25 MG: 25 CAPSULE ORAL at 11:00

## 2020-06-28 RX ADMIN — CHLORDIAZEPOXIDE HYDROCHLORIDE 25 MG: 25 CAPSULE ORAL at 19:53

## 2020-06-28 RX ADMIN — CHLORDIAZEPOXIDE HYDROCHLORIDE 25 MG: 25 CAPSULE ORAL at 04:53

## 2020-06-28 RX ADMIN — Medication 100 MG: at 04:53

## 2020-06-28 RX ADMIN — LEVOTHYROXINE SODIUM 75 MCG: 0.07 TABLET ORAL at 04:53

## 2020-06-28 RX ADMIN — LORAZEPAM 3 MG: 2 TABLET ORAL at 20:07

## 2020-06-28 RX ADMIN — GABAPENTIN 300 MG: 300 CAPSULE ORAL at 14:50

## 2020-06-28 RX ADMIN — SERTRALINE 50 MG: 100 TABLET, FILM COATED ORAL at 14:51

## 2020-06-28 RX ADMIN — OLANZAPINE 10 MG: 5 TABLET, FILM COATED ORAL at 19:53

## 2020-06-28 RX ADMIN — OXYCODONE 5 MG: 5 TABLET ORAL at 09:27

## 2020-06-28 ASSESSMENT — ENCOUNTER SYMPTOMS
NERVOUS/ANXIOUS: 1
SORE THROAT: 0
BLURRED VISION: 0
FALLS: 0
DIARRHEA: 0
WEAKNESS: 0
DIZZINESS: 0
COUGH: 0
HEADACHES: 0
DEPRESSION: 1
NAUSEA: 1
FEVER: 0
TREMORS: 1
CHILLS: 0
ABDOMINAL PAIN: 1
PALPITATIONS: 0
CONSTIPATION: 0
SHORTNESS OF BREATH: 0
VOMITING: 1

## 2020-06-28 ASSESSMENT — LIFESTYLE VARIABLES
AUDITORY DISTURBANCES: VERY MILD HARSHNESS OR ABILITY TO FRIGHTEN
HEADACHE, FULLNESS IN HEAD: MILD
PAROXYSMAL SWEATS: BARELY PERCEPTIBLE SWEATING. PALMS MOIST
ANXIETY: *
PAROXYSMAL SWEATS: BARELY PERCEPTIBLE SWEATING. PALMS MOIST
ORIENTATION AND CLOUDING OF SENSORIUM: ORIENTED AND CAN DO SERIAL ADDITIONS
ANXIETY: *
AUDITORY DISTURBANCES: VERY MILD HARSHNESS OR ABILITY TO FRIGHTEN
PAROXYSMAL SWEATS: BARELY PERCEPTIBLE SWEATING. PALMS MOIST
PAROXYSMAL SWEATS: BARELY PERCEPTIBLE SWEATING. PALMS MOIST
ORIENTATION AND CLOUDING OF SENSORIUM: ORIENTED AND CAN DO SERIAL ADDITIONS
TOTAL SCORE: 11
ANXIETY: *
PAROXYSMAL SWEATS: BARELY PERCEPTIBLE SWEATING. PALMS MOIST
VISUAL DISTURBANCES: NOT PRESENT
HEADACHE, FULLNESS IN HEAD: MODERATE
TOTAL SCORE: 17
VISUAL DISTURBANCES: NOT PRESENT
AUDITORY DISTURBANCES: NOT PRESENT
VISUAL DISTURBANCES: VERY MILD SENSITIVITY
AGITATION: NORMAL ACTIVITY
PAROXYSMAL SWEATS: NO SWEAT VISIBLE
NAUSEA AND VOMITING: NO NAUSEA AND NO VOMITING
ORIENTATION AND CLOUDING OF SENSORIUM: ORIENTED AND CAN DO SERIAL ADDITIONS
AUDITORY DISTURBANCES: VERY MILD HARSHNESS OR ABILITY TO FRIGHTEN
HEADACHE, FULLNESS IN HEAD: NOT PRESENT
VISUAL DISTURBANCES: VERY MILD SENSITIVITY
AUDITORY DISTURBANCES: VERY MILD HARSHNESS OR ABILITY TO FRIGHTEN
AUDITORY DISTURBANCES: NOT PRESENT
NAUSEA AND VOMITING: MILD NAUSEA WITH NO VOMITING
TOTAL SCORE: 4
VISUAL DISTURBANCES: NOT PRESENT
TREMOR: TREMOR NOT VISIBLE BUT CAN BE FELT, FINGERTIP TO FINGERTIP
AGITATION: NORMAL ACTIVITY
TREMOR: NO TREMOR
TOTAL SCORE: 15
AUDITORY DISTURBANCES: NOT PRESENT
HEADACHE, FULLNESS IN HEAD: MILD
ORIENTATION AND CLOUDING OF SENSORIUM: ORIENTED AND CAN DO SERIAL ADDITIONS
ANXIETY: MODERATELY ANXIOUS OR GUARDED, SO ANXIETY IS INFERRED
ORIENTATION AND CLOUDING OF SENSORIUM: ORIENTED AND CAN DO SERIAL ADDITIONS
NAUSEA AND VOMITING: *
TOTAL SCORE: 10
ANXIETY: *
TREMOR: TREMOR NOT VISIBLE BUT CAN BE FELT, FINGERTIP TO FINGERTIP
AGITATION: NORMAL ACTIVITY
TREMOR: *
PAROXYSMAL SWEATS: BARELY PERCEPTIBLE SWEATING. PALMS MOIST
HEADACHE, FULLNESS IN HEAD: NOT PRESENT
AGITATION: SOMEWHAT MORE THAN NORMAL ACTIVITY
TREMOR: TREMOR NOT VISIBLE BUT CAN BE FELT, FINGERTIP TO FINGERTIP
VISUAL DISTURBANCES: NOT PRESENT
ORIENTATION AND CLOUDING OF SENSORIUM: ORIENTED AND CAN DO SERIAL ADDITIONS
TREMOR: *
NAUSEA AND VOMITING: MILD NAUSEA WITH NO VOMITING
AGITATION: SOMEWHAT MORE THAN NORMAL ACTIVITY
TREMOR: *
AGITATION: MODERATELY FIDGETY AND RESTLESS
NAUSEA AND VOMITING: *
HEADACHE, FULLNESS IN HEAD: MILD
HEADACHE, FULLNESS IN HEAD: MODERATELY SEVERE
NAUSEA AND VOMITING: MILD NAUSEA WITH NO VOMITING
TOTAL SCORE: 15
ANXIETY: NO ANXIETY (AT EASE)
NAUSEA AND VOMITING: NO NAUSEA AND NO VOMITING
PAROXYSMAL SWEATS: BARELY PERCEPTIBLE SWEATING. PALMS MOIST
HEADACHE, FULLNESS IN HEAD: MODERATE
NAUSEA AND VOMITING: MILD NAUSEA WITH NO VOMITING
ANXIETY: *
TOTAL SCORE: 13
TREMOR: TREMOR NOT VISIBLE BUT CAN BE FELT, FINGERTIP TO FINGERTIP
ANXIETY: *
AUDITORY DISTURBANCES: NOT PRESENT
TOTAL SCORE: 4
TOTAL SCORE: VERY MILD ITCHING, PINS AND NEEDLES SENSATION, BURNING OR NUMBNESS
AGITATION: *
VISUAL DISTURBANCES: NOT PRESENT
ORIENTATION AND CLOUDING OF SENSORIUM: ORIENTED AND CAN DO SERIAL ADDITIONS
AGITATION: *
ORIENTATION AND CLOUDING OF SENSORIUM: ORIENTED AND CAN DO SERIAL ADDITIONS
TOTAL SCORE: VERY MILD ITCHING, PINS AND NEEDLES SENSATION, BURNING OR NUMBNESS
VISUAL DISTURBANCES: NOT PRESENT
TOTAL SCORE: VERY MILD ITCHING, PINS AND NEEDLES SENSATION, BURNING OR NUMBNESS

## 2020-06-28 ASSESSMENT — PATIENT HEALTH QUESTIONNAIRE - PHQ9
2. FEELING DOWN, DEPRESSED, IRRITABLE, OR HOPELESS: NEARLY EVERY DAY
SUM OF ALL RESPONSES TO PHQ9 QUESTIONS 1 AND 2: 6
1. LITTLE INTEREST OR PLEASURE IN DOING THINGS: NEARLY EVERY DAY

## 2020-06-28 ASSESSMENT — FIBROSIS 4 INDEX: FIB4 SCORE: 10.89

## 2020-06-28 NOTE — PROGRESS NOTES
Report received from Khurram. Pt had increasing CIWA today. Pt is tearful, agitated, restless. Pt awake in bed A&Ox4; no complaints at this time. POC discussed; all questions answered. Bed locked in lowest position; call light in reach; bed alarm in use. 1:1 sitter in place. Room safety checklist in use.

## 2020-06-28 NOTE — PROGRESS NOTES
Rec'd report from previous nurse regarding prior 12 hours.  POC reviewed with pt.  White board updated.  Pt verbalizes understanding.  Call light within reach.  Pt tolerated morning meds.

## 2020-06-28 NOTE — PSYCHIATRY
"PSYCHIATRIC FOLLOW-UP:(established)  *Reason for admission: complaint of suicidal ideation, plan to overdose on propranolol.  She is apparently tried this once previously.  Patient has been feeling suicidal since the death of her daughter 3 weeks ago, apparently killed by a drunk  (this conflicts with other info in the records).  She states past history of alcoholism, had been sober for the last year, started drinking heavily (I gallon/d) with the death of her daughter, estimates up to a gallon of alcohol daily                       *Legal Hold Status:  +                   *HPI: she is still alittle sleepy and slurs a bit but more information was able to be obtained. Her dtr  on 2019.. she says she was managing to not think about this then suddenly for reasons she doesn't know, she began thinking about her again a few months ago and became seriously depressed, not leaving the house or getting out of bed. Depression is an \"8\" (bad), anxiety \"6\".    \"Im better today\"  And would like to go home. But she is crying throughout most of the interview. Pt told she is not medically cleared, on a legal hold and will be staying. She accepted this then asked if she could call her  and walk around the until with the sitter.     Denies SI now.       meds  Reviewed: feels the ones that work best are abiify 15, zyprexa, adderal and klonopin. Hx of zoloft working well. Explained she won't get adderal or klonopin on dc but can return to her psychiatrist Dr Castle.  Does not want depakote because she lost hair and gained wt.     *Psychiatric Examination:  Vitals: Blood pressure 113/72, pulse 89, temperature 36.7 °C (98 °F), temperature source Temporal, resp. rate 16, height 1.753 m (5' 9\"), weight 68.9 kg (152 lb), SpO2 96 %, not currently breastfeeding.  General Appearance: poor eye contact, tending to look down.   Abnormal Movements: none  Gait and Posture: in bed  Speech: slurs a little  Thought processes: " slightly slowed rate  Associations: linear  Abnormal or Psychotic Thoughts: none  Judgement and Insight: fair  Orientation: grossly intact  Recent and Remote Memory: grossly intact  Attention Span and Concentration: intact  Language: not tested  Fund of Knowledge:not tested  Mood and Affect:depressed  SI/HI:denies      *ASSESSMENT/PLAN:  1. Alcohol use disorder  - severe  -withdrawals          2. Methamphetamine use disorder  - hx of being severe, current use unknown     3. Hx of bipolar I disorder: unclear if it is active now or not  -zyprexa 10 mg . The latter will have to be changed most likely because of hx of side effects.  -add abiilty 10 mg  -add zoloft 50 mg  -begin taper to dc of depakote     4.Medical:  - HTN    -hypothyroidism  -SZ disorder (notes)  -hepatic steatosis  -chronic back pain: gabapentin 300 mg tid.     *Legal hold: extended          *Will Follow

## 2020-06-28 NOTE — CARE PLAN
Problem: Communication  Goal: The ability to communicate needs accurately and effectively will improve  Outcome: PROGRESSING SLOWER THAN EXPECTED   Pt A&Ox4; notifies staff of needs appropriately.    Problem: Knowledge Deficit  Goal: Knowledge of disease process/condition, treatment plan, diagnostic tests, and medications will improve  Outcome: PROGRESSING SLOWER THAN EXPECTED   Pt does not show evidence of understanding of Legal 2000.    Problem: Pain Management  Goal: Pain level will decrease to patient's comfort goal  Outcome: PROGRESSING AS EXPECTED   Pt pain being adequately managed with current pain medication regimen.

## 2020-06-28 NOTE — PROGRESS NOTES
Hospital Medicine Daily Progress Note    Date of Service  6/28/2020    Chief Complaint  45 y.o. female admitted 6/26/2020 with suicidal ideations    Hospital Course    Ms. Nancy Olvera is a 45 y.o. female history of bipolar, seizure disorder, hypothyroid, hypertension who presented on 6/26/2020 with a suicidal ideations.  Patient lost her daughter 2 weeks ago and since then has been drinking 1 gallon of alcohol a day.  Also endorses nausea, vomiting, diarrhea x1 week.  Also reports upper epigastric abdominal pain, nonradiating.  On admission a GI cocktail resolved abdominal pain.  CT abdomen pelvis demonstrated hepatic steatosis without pancreatitis.  Urine drug screen positive for cannabinoids and amphetamines.  Psychiatry was consulted for suicidal ideations.        Interval Problem Update  Patient was seen and examined at bedside.  I have personally reviewed vitals, labs, and imaging.    6/27.  Afebrile.  Tachycardiac.  On room air.  Patient reports abdominal pain is well controlled.  She does endorse diarrhea but no blood.  Denies chills, sweats, chest pain, shortness of breath.  Does report fevers.  Tremors are better.  Started on diet.  6/28.  Afebrile.  Tachycardia is improved.  One episode of hypotension was has resolved.  Patient is tearful during interview.  Eyes fevers, chills, chest pain, shortness of breath.  She is having tremors.  She also pulled out her IV and has not been able to get IV lorazepam but has gotten p.o.  She does report nausea and complains of throwing up last Lorazepam dose.  Did tolerate breakfast and lunch.  Okay to come off telemetry monitor.  Appreciate psychiatric recommendations.  Still on legal hold.    Consultants/Specialty  Psychiatry    Code Status  Full    Disposition  Legal hold    Review of Systems  Review of Systems   Constitutional: Negative for chills and fever.   HENT: Negative for congestion and sore throat.    Eyes: Negative for blurred vision.   Respiratory:  Negative for cough and shortness of breath.    Cardiovascular: Negative for chest pain, palpitations and leg swelling.   Gastrointestinal: Positive for abdominal pain, nausea and vomiting. Negative for constipation and diarrhea.   Genitourinary: Negative for dysuria, frequency and urgency.   Musculoskeletal: Negative for falls.   Skin: Negative for rash.   Neurological: Positive for tremors. Negative for dizziness, weakness and headaches.   Psychiatric/Behavioral: Positive for depression. The patient is nervous/anxious.    All other systems reviewed and are negative.       Physical Exam  Temp:  [36.6 °C (97.9 °F)-37.5 °C (99.5 °F)] 36.7 °C (98 °F)  Pulse:  [] 89  Resp:  [16-18] 16  BP: ()/(62-72) 113/72  SpO2:  [95 %-97 %] 96 %    Physical Exam  Vitals signs and nursing note reviewed.   Constitutional:       General: She is not in acute distress.     Appearance: Normal appearance. She is normal weight.      Comments: Tearful   HENT:      Head: Normocephalic and atraumatic.      Right Ear: External ear normal.      Left Ear: External ear normal.      Nose: Nose normal.      Mouth/Throat:      Mouth: Mucous membranes are moist.      Pharynx: Oropharynx is clear. No oropharyngeal exudate or posterior oropharyngeal erythema.   Eyes:      Extraocular Movements: Extraocular movements intact.      Conjunctiva/sclera: Conjunctivae normal.   Neck:      Musculoskeletal: Normal range of motion and neck supple.   Cardiovascular:      Rate and Rhythm: Normal rate and regular rhythm.      Pulses: Normal pulses.      Heart sounds: Normal heart sounds. No murmur.   Pulmonary:      Effort: Pulmonary effort is normal. No respiratory distress.      Breath sounds: Normal breath sounds. No stridor. No wheezing or rales.   Abdominal:      General: Abdomen is flat. Bowel sounds are normal. There is no distension.      Palpations: Abdomen is soft. There is no mass.      Tenderness: There is abdominal tenderness.    Musculoskeletal: Normal range of motion.   Skin:     General: Skin is warm.      Capillary Refill: Capillary refill takes less than 2 seconds.   Neurological:      General: No focal deficit present.      Mental Status: She is alert and oriented to person, place, and time. Mental status is at baseline.      Cranial Nerves: No cranial nerve deficit.   Psychiatric:         Behavior: Behavior normal.      Comments: depressed mood.         Fluids    Intake/Output Summary (Last 24 hours) at 6/28/2020 1156  Last data filed at 6/28/2020 0500  Gross per 24 hour   Intake 240 ml   Output 50 ml   Net 190 ml       Laboratory  Recent Labs     06/26/20  0850 06/27/20  0328 06/28/20  0223   WBC 7.4 5.0 4.4*   RBC 5.28 4.54 4.25   HEMOGLOBIN 16.1* 14.0 13.0   HEMATOCRIT 46.2 41.7 39.4   MCV 87.5 91.9 92.7   MCH 30.5 30.8 30.6   MCHC 34.8 33.6 33.0*   RDW 58.6* 61.9* 61.8*   PLATELETCT 101* 52* 44*   MPV 9.6 10.1 11.3     Recent Labs     06/26/20  0850 06/27/20  0227 06/28/20  0223   SODIUM 141 136 138   POTASSIUM 3.8 4.1 4.1   CHLORIDE 101 102 102   CO2 21 26 27   GLUCOSE 106* 98 99   BUN 6* 9 9   CREATININE 0.62 0.69 0.67   CALCIUM 8.6 8.2* 8.9     Recent Labs     06/26/20  0850   APTT 31.2   INR 0.86*         Recent Labs     06/27/20 0227   TRIGLYCERIDE 35      LDL 34       Imaging  DX-CHEST-PORTABLE (1 VIEW)   Final Result      No acute cardiopulmonary abnormality.      CT-ABDOMEN-PELVIS WITH   Final Result         1. Hepatic steatosis.      2. There is a small sliding hiatal hernia otherwise the remainder of the CT scan of the abdomen and pelvis is unremarkable.           Assessment/Plan  * Suicide ideation- (present on admission)  Assessment & Plan  Patient did have a suicide attempt on 7/2019 propanolol overdose  Patient does have intentions of suicide with propanolol  Legal hold  Psychiatry consult    Manic depressive illness (HCC)- (present on admission)  Assessment & Plan  Continue olanzapine, sertraline,  divalproex, gabapentin, aripiprazole  Appreciate psychiatric input    Gastroesophageal reflux disease with esophagitis  Assessment & Plan  Start omeprazole     Essential hypertension- (present on admission)  Assessment & Plan  controlled  Continue current home propanolol  IV as needed medications have been ordered    Alcohol dependence with intoxication (HCC)- (present on admission)  Assessment & Plan  Patient will be admitted to the telemetry unit with close cardiac monitoring on CIWA protocol  Started on rally bag, multivitamin, thiamine and folate  Replete electrolytes  Patient has been counseled on alcohol cessation and will be provided with information for outpatient detox facilities    Elevated liver function tests- (present on admission)  Assessment & Plan  Secondary to alcohol abuse  Continue to monitor    Thrombocytopenia (HCC)- (present on admission)  Assessment & Plan  Continue to trend    Hypothyroidism- (present on admission)  Assessment & Plan  Continue home thyroid medication     Gastrointestinal prophylaxis: Omeprazole  Antibiotics: None  Diet Order Regular (legal hold please send paperwear only)  Code status: FULL  Prognosis: Guarded  Risk: The Patient is at HIGH risk for complications and decompensation secondary to her multiple cormorbidities including alcohol withdrawal.  Suicidal ideations.  Depression.  Hypertension.    I have personally reviewed notes, labs, vitals, imaging.  I discussed the plan of care with bedside RN as well as on multidisciplinary rounds    I have performed a physical exam and reviewed and updated ROS and Plan today (6/28/2020). In review of yesterday's note (6/27/2020), there are no changes except as documented above.     VTE prophylaxis: SCDs.  Avoid anticoagulation in setting of thrombocytopenia.

## 2020-06-29 ENCOUNTER — APPOINTMENT (OUTPATIENT)
Dept: RADIOLOGY | Facility: MEDICAL CENTER | Age: 45
DRG: 897 | End: 2020-06-29
Attending: INTERNAL MEDICINE
Payer: MEDICARE

## 2020-06-29 PROBLEM — Z72.0 TOBACCO ABUSE: Status: ACTIVE | Noted: 2020-06-29

## 2020-06-29 PROBLEM — E87.8 ELECTROLYTE ABNORMALITY: Status: ACTIVE | Noted: 2020-06-29

## 2020-06-29 LAB
ANION GAP SERPL CALC-SCNC: 11 MMOL/L (ref 7–16)
BASOPHILS # BLD AUTO: 0.2 % (ref 0–1.8)
BASOPHILS # BLD: 0.01 K/UL (ref 0–0.12)
BUN SERPL-MCNC: 7 MG/DL (ref 8–22)
CALCIUM SERPL-MCNC: 8.9 MG/DL (ref 8.5–10.5)
CHLORIDE SERPL-SCNC: 101 MMOL/L (ref 96–112)
CO2 SERPL-SCNC: 25 MMOL/L (ref 20–33)
CREAT SERPL-MCNC: 0.64 MG/DL (ref 0.5–1.4)
EOSINOPHIL # BLD AUTO: 0.47 K/UL (ref 0–0.51)
EOSINOPHIL NFR BLD: 10 % (ref 0–6.9)
ERYTHROCYTE [DISTWIDTH] IN BLOOD BY AUTOMATED COUNT: 61.9 FL (ref 35.9–50)
GLUCOSE SERPL-MCNC: 98 MG/DL (ref 65–99)
HCT VFR BLD AUTO: 38.2 % (ref 37–47)
HGB BLD-MCNC: 12.5 G/DL (ref 12–16)
IMM GRANULOCYTES # BLD AUTO: 0.02 K/UL (ref 0–0.11)
IMM GRANULOCYTES NFR BLD AUTO: 0.4 % (ref 0–0.9)
LYMPHOCYTES # BLD AUTO: 1.16 K/UL (ref 1–4.8)
LYMPHOCYTES NFR BLD: 24.8 % (ref 22–41)
MAGNESIUM SERPL-MCNC: 1.9 MG/DL (ref 1.5–2.5)
MCH RBC QN AUTO: 30.8 PG (ref 27–33)
MCHC RBC AUTO-ENTMCNC: 32.7 G/DL (ref 33.6–35)
MCV RBC AUTO: 94.1 FL (ref 81.4–97.8)
MONOCYTES # BLD AUTO: 0.22 K/UL (ref 0–0.85)
MONOCYTES NFR BLD AUTO: 4.7 % (ref 0–13.4)
NEUTROPHILS # BLD AUTO: 2.8 K/UL (ref 2–7.15)
NEUTROPHILS NFR BLD: 59.9 % (ref 44–72)
NRBC # BLD AUTO: 0 K/UL
NRBC BLD-RTO: 0 /100 WBC
PHOSPHATE SERPL-MCNC: 3.1 MG/DL (ref 2.5–4.5)
PLATELET # BLD AUTO: 47 K/UL (ref 164–446)
PMV BLD AUTO: 10.7 FL (ref 9–12.9)
POTASSIUM SERPL-SCNC: 3.8 MMOL/L (ref 3.6–5.5)
RBC # BLD AUTO: 4.06 M/UL (ref 4.2–5.4)
SODIUM SERPL-SCNC: 137 MMOL/L (ref 135–145)
WBC # BLD AUTO: 4.7 K/UL (ref 4.8–10.8)

## 2020-06-29 PROCEDURE — 700102 HCHG RX REV CODE 250 W/ 637 OVERRIDE(OP): Performed by: PSYCHIATRY & NEUROLOGY

## 2020-06-29 PROCEDURE — 05HY33Z INSERTION OF INFUSION DEVICE INTO UPPER VEIN, PERCUTANEOUS APPROACH: ICD-10-PCS | Performed by: INTERNAL MEDICINE

## 2020-06-29 PROCEDURE — 700111 HCHG RX REV CODE 636 W/ 250 OVERRIDE (IP): Performed by: INTERNAL MEDICINE

## 2020-06-29 PROCEDURE — A9270 NON-COVERED ITEM OR SERVICE: HCPCS | Performed by: HOSPITALIST

## 2020-06-29 PROCEDURE — 700102 HCHG RX REV CODE 250 W/ 637 OVERRIDE(OP): Performed by: INTERNAL MEDICINE

## 2020-06-29 PROCEDURE — 80048 BASIC METABOLIC PNL TOTAL CA: CPT

## 2020-06-29 PROCEDURE — 84100 ASSAY OF PHOSPHORUS: CPT

## 2020-06-29 PROCEDURE — 99291 CRITICAL CARE FIRST HOUR: CPT | Performed by: INTERNAL MEDICINE

## 2020-06-29 PROCEDURE — 306558 VEST RESTRAINT (MEDIUM): Performed by: INTERNAL MEDICINE

## 2020-06-29 PROCEDURE — A9270 NON-COVERED ITEM OR SERVICE: HCPCS | Performed by: PSYCHIATRY & NEUROLOGY

## 2020-06-29 PROCEDURE — 85025 COMPLETE CBC W/AUTO DIFF WBC: CPT

## 2020-06-29 PROCEDURE — A9270 NON-COVERED ITEM OR SERVICE: HCPCS | Performed by: INTERNAL MEDICINE

## 2020-06-29 PROCEDURE — 770022 HCHG ROOM/CARE - ICU (200)

## 2020-06-29 PROCEDURE — 700105 HCHG RX REV CODE 258: Performed by: HOSPITALIST

## 2020-06-29 PROCEDURE — 76937 US GUIDE VASCULAR ACCESS: CPT

## 2020-06-29 PROCEDURE — 700105 HCHG RX REV CODE 258: Performed by: INTERNAL MEDICINE

## 2020-06-29 PROCEDURE — 700102 HCHG RX REV CODE 250 W/ 637 OVERRIDE(OP): Performed by: HOSPITALIST

## 2020-06-29 PROCEDURE — 700111 HCHG RX REV CODE 636 W/ 250 OVERRIDE (IP): Performed by: HOSPITALIST

## 2020-06-29 PROCEDURE — 83735 ASSAY OF MAGNESIUM: CPT

## 2020-06-29 PROCEDURE — 700101 HCHG RX REV CODE 250: Performed by: INTERNAL MEDICINE

## 2020-06-29 PROCEDURE — 99232 SBSQ HOSP IP/OBS MODERATE 35: CPT | Performed by: PSYCHIATRY & NEUROLOGY

## 2020-06-29 RX ORDER — THIAMINE MONONITRATE (VIT B1) 100 MG
100 TABLET ORAL DAILY
Status: DISPENSED | OUTPATIENT
Start: 2020-06-30 | End: 2020-07-04

## 2020-06-29 RX ORDER — LORAZEPAM 2 MG/ML
2 INJECTION INTRAMUSCULAR EVERY 4 HOURS
Status: DISCONTINUED | OUTPATIENT
Start: 2020-06-29 | End: 2020-07-01

## 2020-06-29 RX ORDER — CHLORDIAZEPOXIDE HYDROCHLORIDE 25 MG/1
25 CAPSULE, GELATIN COATED ORAL EVERY 8 HOURS
Status: DISCONTINUED | OUTPATIENT
Start: 2020-06-29 | End: 2020-06-30

## 2020-06-29 RX ORDER — CLONIDINE HYDROCHLORIDE 0.1 MG/1
0.1 TABLET ORAL 4 TIMES DAILY PRN
Status: DISCONTINUED | OUTPATIENT
Start: 2020-06-29 | End: 2020-07-07 | Stop reason: HOSPADM

## 2020-06-29 RX ORDER — LORAZEPAM 2 MG/ML
1-2 INJECTION INTRAMUSCULAR
Status: DISCONTINUED | OUTPATIENT
Start: 2020-06-29 | End: 2020-07-01

## 2020-06-29 RX ORDER — POTASSIUM CHLORIDE 20 MEQ/1
20 TABLET, EXTENDED RELEASE ORAL ONCE
Status: ACTIVE | OUTPATIENT
Start: 2020-06-29 | End: 2020-06-30

## 2020-06-29 RX ORDER — HALOPERIDOL 5 MG/ML
5 INJECTION INTRAMUSCULAR EVERY 4 HOURS PRN
Status: DISCONTINUED | OUTPATIENT
Start: 2020-06-29 | End: 2020-07-07 | Stop reason: HOSPADM

## 2020-06-29 RX ORDER — FOLIC ACID 1 MG/1
1 TABLET ORAL DAILY
Status: DISPENSED | OUTPATIENT
Start: 2020-06-30 | End: 2020-07-04

## 2020-06-29 RX ORDER — DEXMEDETOMIDINE HYDROCHLORIDE 4 UG/ML
.1-1.5 INJECTION, SOLUTION INTRAVENOUS CONTINUOUS
Status: DISCONTINUED | OUTPATIENT
Start: 2020-06-29 | End: 2020-07-01

## 2020-06-29 RX ADMIN — LORAZEPAM 2 MG: 2 INJECTION INTRAMUSCULAR; INTRAVENOUS at 12:29

## 2020-06-29 RX ADMIN — SODIUM CHLORIDE, POTASSIUM CHLORIDE, SODIUM LACTATE AND CALCIUM CHLORIDE: 600; 310; 30; 20 INJECTION, SOLUTION INTRAVENOUS at 00:36

## 2020-06-29 RX ADMIN — LORAZEPAM 1.5 MG: 2 INJECTION INTRAMUSCULAR; INTRAVENOUS at 01:52

## 2020-06-29 RX ADMIN — ARIPIPRAZOLE 10 MG: 10 TABLET ORAL at 05:10

## 2020-06-29 RX ADMIN — Medication 400 MG: at 10:33

## 2020-06-29 RX ADMIN — LORAZEPAM 2 MG: 2 INJECTION INTRAMUSCULAR; INTRAVENOUS at 12:44

## 2020-06-29 RX ADMIN — LORAZEPAM 2 MG: 2 INJECTION INTRAMUSCULAR; INTRAVENOUS at 19:20

## 2020-06-29 RX ADMIN — LORAZEPAM 3 MG: 2 TABLET ORAL at 11:21

## 2020-06-29 RX ADMIN — THERA TABS 1 TABLET: TAB at 05:12

## 2020-06-29 RX ADMIN — ACETAMINOPHEN 650 MG: 325 TABLET, FILM COATED ORAL at 10:33

## 2020-06-29 RX ADMIN — THIAMINE HYDROCHLORIDE: 100 INJECTION, SOLUTION INTRAMUSCULAR; INTRAVENOUS at 17:20

## 2020-06-29 RX ADMIN — SODIUM CHLORIDE, POTASSIUM CHLORIDE, SODIUM LACTATE AND CALCIUM CHLORIDE: 600; 310; 30; 20 INJECTION, SOLUTION INTRAVENOUS at 17:19

## 2020-06-29 RX ADMIN — LORAZEPAM 2 MG: 2 INJECTION INTRAMUSCULAR; INTRAVENOUS at 13:44

## 2020-06-29 RX ADMIN — CHLORDIAZEPOXIDE HYDROCHLORIDE 25 MG: 25 CAPSULE ORAL at 05:12

## 2020-06-29 RX ADMIN — FOLIC ACID 1 MG: 1 TABLET ORAL at 05:08

## 2020-06-29 RX ADMIN — OMEPRAZOLE 20 MG: 20 CAPSULE, DELAYED RELEASE ORAL at 05:07

## 2020-06-29 RX ADMIN — DEXMEDETOMIDINE HYDROCHLORIDE 1 MCG/KG/HR: 100 INJECTION, SOLUTION INTRAVENOUS at 21:19

## 2020-06-29 RX ADMIN — CHLORDIAZEPOXIDE HYDROCHLORIDE 25 MG: 25 CAPSULE ORAL at 00:32

## 2020-06-29 RX ADMIN — SERTRALINE 50 MG: 100 TABLET, FILM COATED ORAL at 05:10

## 2020-06-29 RX ADMIN — GABAPENTIN 300 MG: 300 CAPSULE ORAL at 14:53

## 2020-06-29 RX ADMIN — LORAZEPAM 2 MG: 2 INJECTION INTRAMUSCULAR; INTRAVENOUS at 12:59

## 2020-06-29 RX ADMIN — LORAZEPAM 1.5 MG: 2 INJECTION INTRAMUSCULAR; INTRAVENOUS at 03:58

## 2020-06-29 RX ADMIN — LORAZEPAM 3 MG: 2 TABLET ORAL at 07:22

## 2020-06-29 RX ADMIN — LORAZEPAM 2 MG: 2 INJECTION INTRAMUSCULAR; INTRAVENOUS at 13:16

## 2020-06-29 RX ADMIN — DEXMEDETOMIDINE HYDROCHLORIDE 0.3 MCG/KG/HR: 100 INJECTION, SOLUTION INTRAVENOUS at 14:45

## 2020-06-29 RX ADMIN — GABAPENTIN 300 MG: 300 CAPSULE ORAL at 05:07

## 2020-06-29 RX ADMIN — LORAZEPAM 3 MG: 2 TABLET ORAL at 10:33

## 2020-06-29 RX ADMIN — LORAZEPAM 2 MG: 2 INJECTION INTRAMUSCULAR; INTRAVENOUS at 13:30

## 2020-06-29 RX ADMIN — OXYCODONE 5 MG: 5 TABLET ORAL at 07:22

## 2020-06-29 RX ADMIN — LORAZEPAM 3 MG: 2 TABLET ORAL at 02:42

## 2020-06-29 RX ADMIN — LORAZEPAM 2 MG: 2 INJECTION INTRAMUSCULAR; INTRAVENOUS at 14:00

## 2020-06-29 RX ADMIN — LORAZEPAM 2 MG: 2 INJECTION INTRAMUSCULAR; INTRAVENOUS at 20:59

## 2020-06-29 RX ADMIN — LORAZEPAM 2 MG: 2 INJECTION INTRAMUSCULAR; INTRAVENOUS at 21:14

## 2020-06-29 RX ADMIN — LEVOTHYROXINE SODIUM 75 MCG: 0.07 TABLET ORAL at 05:12

## 2020-06-29 RX ADMIN — LORAZEPAM 2 MG: 2 INJECTION INTRAMUSCULAR; INTRAVENOUS at 14:53

## 2020-06-29 RX ADMIN — LORAZEPAM 3 MG: 2 TABLET ORAL at 00:36

## 2020-06-29 RX ADMIN — OXYCODONE 5 MG: 5 TABLET ORAL at 00:32

## 2020-06-29 RX ADMIN — LORAZEPAM 3 MG: 2 TABLET ORAL at 05:12

## 2020-06-29 RX ADMIN — Medication 100 MG: at 05:08

## 2020-06-29 RX ADMIN — NICOTINE POLACRILEX 2 MG: 2 GUM, CHEWING BUCCAL at 03:58

## 2020-06-29 RX ADMIN — LORAZEPAM 2 MG: 2 INJECTION INTRAMUSCULAR; INTRAVENOUS at 12:15

## 2020-06-29 ASSESSMENT — LIFESTYLE VARIABLES
TOTAL SCORE: 25
AGITATION: *
ORIENTATION AND CLOUDING OF SENSORIUM: DISORIENTED FOR PLACE AND / OR PERSON
HEADACHE, FULLNESS IN HEAD: MODERATE
ANXIETY: *
AUDITORY DISTURBANCES: MILD HARSHNESS OR ABILITY TO FRIGHTEN
ORIENTATION AND CLOUDING OF SENSORIUM: DISORIENTED FOR PLACE AND / OR PERSON
PAROXYSMAL SWEATS: BARELY PERCEPTIBLE SWEATING. PALMS MOIST
PAROXYSMAL SWEATS: *
TOTAL SCORE: 25
ANXIETY: MODERATELY ANXIOUS OR GUARDED, SO ANXIETY IS INFERRED
ANXIETY: MODERATELY ANXIOUS OR GUARDED, SO ANXIETY IS INFERRED
TREMOR: TREMOR NOT VISIBLE BUT CAN BE FELT, FINGERTIP TO FINGERTIP
HEADACHE, FULLNESS IN HEAD: MODERATELY SEVERE
AUDITORY DISTURBANCES: MILD HARSHNESS OR ABILITY TO FRIGHTEN
HEADACHE, FULLNESS IN HEAD: MODERATE
ANXIETY: MODERATELY ANXIOUS OR GUARDED, SO ANXIETY IS INFERRED
AUDITORY DISTURBANCES: VERY MILD HARSHNESS OR ABILITY TO FRIGHTEN
TOTAL SCORE: MILD ITCHING, PINS AND NEEDLES SENSATION, BURNING OR NUMBNESS
VISUAL DISTURBANCES: VERY MILD SENSITIVITY
AGITATION: *
TOTAL SCORE: 28
TOTAL SCORE: MILD ITCHING, PINS AND NEEDLES SENSATION, BURNING OR NUMBNESS
ANXIETY: MODERATELY ANXIOUS OR GUARDED, SO ANXIETY IS INFERRED
ORIENTATION AND CLOUDING OF SENSORIUM: CANNOT DO SERIAL ADDITIONS OR IS UNCERTAIN ABOUT DATE
ORIENTATION AND CLOUDING OF SENSORIUM: DISORIENTED FOR PLACE AND / OR PERSON
TOTAL SCORE: 17
TOTAL SCORE: MILD ITCHING, PINS AND NEEDLES SENSATION, BURNING OR NUMBNESS
ORIENTATION AND CLOUDING OF SENSORIUM: DISORIENTED FOR PLACE AND / OR PERSON
NAUSEA AND VOMITING: MILD NAUSEA WITH NO VOMITING
AGITATION: *
HEADACHE, FULLNESS IN HEAD: MODERATE
AUDITORY DISTURBANCES: NOT PRESENT
ORIENTATION AND CLOUDING OF SENSORIUM: DISORIENTED FOR PLACE AND / OR PERSON
NAUSEA AND VOMITING: NO NAUSEA AND NO VOMITING
ORIENTATION AND CLOUDING OF SENSORIUM: ORIENTED AND CAN DO SERIAL ADDITIONS
TOTAL SCORE: VERY MILD ITCHING, PINS AND NEEDLES SENSATION, BURNING OR NUMBNESS
PAROXYSMAL SWEATS: *
ANXIETY: MODERATELY ANXIOUS OR GUARDED, SO ANXIETY IS INFERRED
HEADACHE, FULLNESS IN HEAD: MODERATE
NAUSEA AND VOMITING: MILD NAUSEA WITH NO VOMITING
AGITATION: *
TOTAL SCORE: 24
TREMOR: MODERATE TREMOR WITH ARMS EXTENDED
VISUAL DISTURBANCES: MODERATELY SEVERE HALLUCINATIONS
PAROXYSMAL SWEATS: BARELY PERCEPTIBLE SWEATING. PALMS MOIST
VISUAL DISTURBANCES: MILD SENSITIVITY
ORIENTATION AND CLOUDING OF SENSORIUM: DISORIENTED FOR PLACE AND / OR PERSON
ORIENTATION AND CLOUDING OF SENSORIUM: ORIENTED AND CAN DO SERIAL ADDITIONS
HEADACHE, FULLNESS IN HEAD: MILD
TREMOR: MODERATE TREMOR WITH ARMS EXTENDED
VISUAL DISTURBANCES: VERY MILD SENSITIVITY
AUDITORY DISTURBANCES: MILD HARSHNESS OR ABILITY TO FRIGHTEN
AGITATION: MODERATELY FIDGETY AND RESTLESS
TREMOR: MODERATE TREMOR WITH ARMS EXTENDED
HEADACHE, FULLNESS IN HEAD: MODERATE
VISUAL DISTURBANCES: MILD SENSITIVITY
VISUAL DISTURBANCES: MODERATE SENSITIVITY
ANXIETY: MODERATELY ANXIOUS OR GUARDED, SO ANXIETY IS INFERRED
VISUAL DISTURBANCES: VERY MILD SENSITIVITY
VISUAL DISTURBANCES: VERY MILD SENSITIVITY
TREMOR: MODERATE TREMOR WITH ARMS EXTENDED
VISUAL DISTURBANCES: VERY MILD SENSITIVITY
NAUSEA AND VOMITING: NO NAUSEA AND NO VOMITING
TOTAL SCORE: VERY MILD ITCHING, PINS AND NEEDLES SENSATION, BURNING OR NUMBNESS
PAROXYSMAL SWEATS: *
AUDITORY DISTURBANCES: NOT PRESENT
NAUSEA AND VOMITING: NO NAUSEA AND NO VOMITING
TOTAL SCORE: MILD ITCHING, PINS AND NEEDLES SENSATION, BURNING OR NUMBNESS
AUDITORY DISTURBANCES: MILD HARSHNESS OR ABILITY TO FRIGHTEN
TOTAL SCORE: 25
PAROXYSMAL SWEATS: *
TREMOR: TREMOR NOT VISIBLE BUT CAN BE FELT, FINGERTIP TO FINGERTIP
HEADACHE, FULLNESS IN HEAD: MODERATELY SEVERE
TOTAL SCORE: 28
ORIENTATION AND CLOUDING OF SENSORIUM: DISORIENTED FOR PLACE AND / OR PERSON
ANXIETY: *
TOTAL SCORE: MILD ITCHING, PINS AND NEEDLES SENSATION, BURNING OR NUMBNESS
ORIENTATION AND CLOUDING OF SENSORIUM: DISORIENTED FOR PLACE AND / OR PERSON
ORIENTATION AND CLOUDING OF SENSORIUM: DISORIENTED FOR PLACE AND / OR PERSON
AGITATION: MODERATELY FIDGETY AND RESTLESS
AUDITORY DISTURBANCES: VERY MILD HARSHNESS OR ABILITY TO FRIGHTEN
VISUAL DISTURBANCES: VERY MILD SENSITIVITY
ANXIETY: MODERATELY ANXIOUS OR GUARDED, SO ANXIETY IS INFERRED
TOTAL SCORE: 28
PAROXYSMAL SWEATS: BARELY PERCEPTIBLE SWEATING. PALMS MOIST
TOTAL SCORE: VERY MILD ITCHING, PINS AND NEEDLES SENSATION, BURNING OR NUMBNESS
NAUSEA AND VOMITING: MILD NAUSEA WITH NO VOMITING
AGITATION: MODERATELY FIDGETY AND RESTLESS
ANXIETY: *
NAUSEA AND VOMITING: MILD NAUSEA WITH NO VOMITING
TREMOR: MODERATE TREMOR WITH ARMS EXTENDED
NAUSEA AND VOMITING: NO NAUSEA AND NO VOMITING
TREMOR: TREMOR NOT VISIBLE BUT CAN BE FELT, FINGERTIP TO FINGERTIP
HEADACHE, FULLNESS IN HEAD: MODERATELY SEVERE
TOTAL SCORE: 26
AUDITORY DISTURBANCES: MILD HARSHNESS OR ABILITY TO FRIGHTEN
TREMOR: MODERATE TREMOR WITH ARMS EXTENDED
HEADACHE, FULLNESS IN HEAD: MODERATELY SEVERE
PAROXYSMAL SWEATS: *
TREMOR: MODERATE TREMOR WITH ARMS EXTENDED
AUDITORY DISTURBANCES: MILD HARSHNESS OR ABILITY TO FRIGHTEN
NAUSEA AND VOMITING: MILD NAUSEA WITH NO VOMITING
AUDITORY DISTURBANCES: NOT PRESENT
VISUAL DISTURBANCES: MODERATELY SEVERE HALLUCINATIONS
TOTAL SCORE: 30
ANXIETY: MODERATELY ANXIOUS OR GUARDED, SO ANXIETY IS INFERRED
PAROXYSMAL SWEATS: BARELY PERCEPTIBLE SWEATING. PALMS MOIST
VISUAL DISTURBANCES: MODERATE SENSITIVITY
ANXIETY: MODERATELY ANXIOUS OR GUARDED, SO ANXIETY IS INFERRED
AUDITORY DISTURBANCES: VERY MILD HARSHNESS OR ABILITY TO FRIGHTEN
PAROXYSMAL SWEATS: NO SWEAT VISIBLE
ORIENTATION AND CLOUDING OF SENSORIUM: DISORIENTED FOR PLACE AND / OR PERSON
TOTAL SCORE: 17
PAROXYSMAL SWEATS: BARELY PERCEPTIBLE SWEATING. PALMS MOIST
HEADACHE, FULLNESS IN HEAD: MODERATELY SEVERE
TREMOR: *
PAROXYSMAL SWEATS: BARELY PERCEPTIBLE SWEATING. PALMS MOIST
TREMOR: TREMOR NOT VISIBLE BUT CAN BE FELT, FINGERTIP TO FINGERTIP
NAUSEA AND VOMITING: NO NAUSEA AND NO VOMITING
TOTAL SCORE: 23
TOTAL SCORE: 30
ANXIETY: MODERATELY ANXIOUS OR GUARDED, SO ANXIETY IS INFERRED
VISUAL DISTURBANCES: MILD SENSITIVITY
TOTAL SCORE: MILD ITCHING, PINS AND NEEDLES SENSATION, BURNING OR NUMBNESS
AUDITORY DISTURBANCES: VERY MILD HARSHNESS OR ABILITY TO FRIGHTEN
TOTAL SCORE: 28
AGITATION: *
ANXIETY: *
TOTAL SCORE: MILD ITCHING, PINS AND NEEDLES SENSATION, BURNING OR NUMBNESS
TREMOR: TREMOR NOT VISIBLE BUT CAN BE FELT, FINGERTIP TO FINGERTIP
ANXIETY: MODERATELY ANXIOUS OR GUARDED, SO ANXIETY IS INFERRED
TREMOR: MODERATE TREMOR WITH ARMS EXTENDED
AUDITORY DISTURBANCES: VERY MILD HARSHNESS OR ABILITY TO FRIGHTEN
HEADACHE, FULLNESS IN HEAD: MODERATELY SEVERE
NAUSEA AND VOMITING: MILD NAUSEA WITH NO VOMITING
PAROXYSMAL SWEATS: BARELY PERCEPTIBLE SWEATING. PALMS MOIST
AGITATION: *
AGITATION: *
AGITATION: MODERATELY FIDGETY AND RESTLESS
AGITATION: *
PAROXYSMAL SWEATS: BARELY PERCEPTIBLE SWEATING. PALMS MOIST
ORIENTATION AND CLOUDING OF SENSORIUM: DISORIENTED FOR PLACE AND / OR PERSON
NAUSEA AND VOMITING: NO NAUSEA AND NO VOMITING
HEADACHE, FULLNESS IN HEAD: MODERATELY SEVERE
PAROXYSMAL SWEATS: *
NAUSEA AND VOMITING: NO NAUSEA AND NO VOMITING
AGITATION: MODERATELY FIDGETY AND RESTLESS
VISUAL DISTURBANCES: MODERATELY SEVERE HALLUCINATIONS
AGITATION: MODERATELY FIDGETY AND RESTLESS
NAUSEA AND VOMITING: MILD NAUSEA WITH NO VOMITING
ORIENTATION AND CLOUDING OF SENSORIUM: DISORIENTED FOR PLACE AND / OR PERSON
VISUAL DISTURBANCES: VERY MILD SENSITIVITY
NAUSEA AND VOMITING: MILD NAUSEA WITH NO VOMITING
AUDITORY DISTURBANCES: MILD HARSHNESS OR ABILITY TO FRIGHTEN
HEADACHE, FULLNESS IN HEAD: MODERATELY SEVERE
HEADACHE, FULLNESS IN HEAD: MODERATELY SEVERE
TOTAL SCORE: 16
TREMOR: TREMOR NOT VISIBLE BUT CAN BE FELT, FINGERTIP TO FINGERTIP
AGITATION: MODERATELY FIDGETY AND RESTLESS

## 2020-06-29 NOTE — THERAPY
06/28/20 1828   Initial Contact Note    Initial Contact Note Order Received and Verified, Occupational Therapy Evaluation in Progress with Full Report to Follow.   Interdisciplinary Plan of Care Collaboration   Collaboration Comments attempted; pt had just fallen asleep; was requested to come back later and unable to return today; discussed with RN

## 2020-06-29 NOTE — THERAPY
Physical Therapy Contact Note:       06/29/20 5083   Interdisciplinary Plan of Care Collaboration   IDT Collaboration with  Nursing   Collaboration Comments Pt seen ambulating around unit with sitter. No PT eval indicated at this time as per conversation with RN. Order completed, please re-order if needed.     Alesia Hillman PT, DPT  Pager [phone]

## 2020-06-29 NOTE — THERAPY
Occupational Therapy Contact Note:    OT orders received, pt walking/running through halls with sitter opening cabinets looking for alcohol. No eval indicated, deficits likely cognitive in nature.    Za Anderson,  Pager: [phone]

## 2020-06-29 NOTE — ASSESSMENT & PLAN NOTE
Controlled without medication at this time  Hold scheduled propanolol  PRN clonidine and labetalol for goal SBP less than 160

## 2020-06-29 NOTE — PROGRESS NOTES
Bedside report received. Pt resting at this time. Call light within reach, fall precautions in place. Legal hold in place, sitter at bedside.

## 2020-06-29 NOTE — DISCHARGE PLANNING
Obtained certification for legal hold from Dr. Sanchez at 0825. Faxed paperwork to [employer]. Original paperwork and copy placed back in Pt's chart.

## 2020-06-29 NOTE — ASSESSMENT & PLAN NOTE
Severe alcohol withdrawal syndrome -improving  Discontinue Precedex drip,   Continue scheduled Ativan with PRN Ativan monitoring RASS (goal 0 to +1)  Status post vitamin supplementation  Eventual alcohol cessation education and resources to be provided

## 2020-06-29 NOTE — ASSESSMENT & PLAN NOTE
Self-reported hematemesis  Proton pump inhibitor  Hold chemical DVT prophylaxis for now, use SCDs only

## 2020-06-29 NOTE — PROGRESS NOTES
Provider updated on amount of attivan that patient has received and CIWA protocol, provider to put in order for transfer to ICU

## 2020-06-29 NOTE — CONSULTS
"PSYCHIATRIC FOLLOW-UP:(established)  *Reason for admission:  complaint of suicidal ideation, plan to overdose on propranolol.  She is apparently tried this once previously.  Patient has been feeling suicidal since the death of her daughter 3 weeks ago, apparently killed by a drunk  (this conflicts with other info in the records).  She states past history of alcoholism, had been sober for the last year, started drinking heavily (I gallon/d) with the death of her daughter, estimates up to a gallon of alcohol daily                 *Legal Hold Status:   +           *HPI:  She is still encephalopathic: tried to go to the bathroom by opening the wall next to it. After 15 seconds tried the actual door. According to the sitter, she asked her to go buy her vokda, her mood fluctuates, tried to destroy the bed parts (railings and the like). Responded to one question by telling me about a movie whose mom wanted to dress the girt like a boy and then one day pulled the pants down and he was a boy. Asked how this related to her and she smiles.         *Psychiatric Examination:  Vitals: Blood pressure 107/63, pulse 88, temperature 37.1 °C (98.7 °F), temperature source Temporal, resp. rate 14, height 1.753 m (5' 9\"), weight 70.3 kg (154 lb 15.7 oz), SpO2 97 %, not currently breastfeeding.  General Appearance: poor eye contact, totally confused and unable to provide helpful hx  Abnormal Movements: none  Gait and Posture: unsteady on feet.  Speech: slurs a little less  Thought processes: slightly slowed rate  Associations: loose  Abnormal or Psychotic Thoughts: none  Judgement and Insight: fair  Orientation: to self and hospital  Recent and Remote Memory: confused.  Attention Span and Concentration: mildy intact  Language: not tested  Fund of Knowledge:not tested  Mood and AffecT: not identified  SI/HI:denies      *ASSESSMENT/PLAN:  1.Encephalopathy    2.Alcohol use disorder  - severe  -withdrawals          3. Methamphetamine " use disorder  - hx of being severe, current use unknown     4. Hx of bipolar I disorder: unclear if it is active now or not  -zyprexa 10 mg . The latter will have to be changed most likely because of hx of side effects.  -add abiilty 10 mg  -add zoloft 50 mg  -begin taper to dc of depakote     4.Medical:  - HTN    -hypothyroidism  -SZ disorder (notes)  -hepatic steatosis  -chronic back pain: gabapentin 300 mg tid.        *Legal hold: extended. Still cannot get a good hx.          *Will Follow

## 2020-06-29 NOTE — PROGRESS NOTES
Paged Dr. Rahman regarding pt nicotine cravings. Pt currently has no orders for nicotine patch or nicorette gum. Per Dr. Rahman, OK to order nicorette gum.

## 2020-06-29 NOTE — DISCHARGE PLANNING
Filed petition to the court via [employer]. Waiting on verified petition.

## 2020-06-29 NOTE — PROGRESS NOTES
Provider notified of hard IV access, verbal orders obtained for midline. Physician also updated on increasing agitation and increased wandering. Verbal order given for soft restraints at nursing discretion.

## 2020-06-29 NOTE — PROCEDURES
Vascular Access Team    Date of Insertion: 6/29/20  Arm Circumference: n/a  Line Length: 10  Line Size: 20  Vein Occupancy %: 19  Reason for Midline: Access  Labs: WBC 4.7, PLT 47, BUN 7, Cr 0.64, GFR >60, INR NA    Orders confirmed, vessel patency confirmed with ultrasound. Risks and benefits of procedure explained to patient and education regarding line associated bloodstream infections provided. Questions answered.     PowerGlide Midline placed in LUE per licensed provider order with ultrasound guidance. 20g, 10 cm line placed in Bacilic vein after 1 attempt(s).  Catheter inserted with brisk blood return. Secured with 0 cm external from insertion site.  Line flushed without resistance with 10 mL 0.9% normal saline.  Midline secured with Biopatch and Tegaderm.     Midline placement is confirmed by nurse using ultrasound and ability to flush and draw blood. Midline is appropriate for use at this time.  No X-ray is needed for placement confirmation. Pt tolerated procedure well.  Patient condition relayed to unit RN or ordering physician via this post procedure note in the EMR.    Ultrasound images uploaded to PACS and viewable in the EMR - yes  Ultrasound imaged printed and placed in paper chart - no      BARD PowerGlide Midline ref # [id number], Lot # [id number], Expiration Date 4/30/21

## 2020-06-29 NOTE — PROGRESS NOTES
Hospital Medicine Daily Progress Note    Date of Service  6/29/2020    Chief Complaint  45 y.o. female admitted 6/26/2020 with suicidal ideations    Hospital Course    Ms. Nancy Olvera is a 45 y.o. female history of bipolar, seizure disorder, hypothyroid, hypertension who presented on 6/26/2020 with a suicidal ideations.  Patient lost her daughter 2 weeks ago and since then has been drinking 1 gallon of alcohol a day.  Also endorses nausea, vomiting, diarrhea x1 week.  Also reports upper epigastric abdominal pain, nonradiating.  On admission a GI cocktail resolved abdominal pain.  CT abdomen pelvis demonstrated hepatic steatosis without pancreatitis.  Urine drug screen positive for cannabinoids and amphetamines.  Psychiatry was consulted for suicidal ideations.        Interval Problem Update  Patient was seen and examined at bedside.  I have personally reviewed vitals, labs, and imaging.    6/27.  Afebrile.  Tachycardiac.  On room air.  Patient reports abdominal pain is well controlled.  She does endorse diarrhea but no blood.  Denies chills, sweats, chest pain, shortness of breath.  Does report fevers.  Tremors are better.  Started on diet.  6/28.  Afebrile.  Tachycardia is improved.  One episode of hypotension was has resolved.  Patient is tearful during interview.  Eyes fevers, chills, chest pain, shortness of breath.  She is having tremors.  She also pulled out her IV and has not been able to get IV lorazepam but has gotten p.o.  She does report nausea and complains of throwing up last Lorazepam dose.  Did tolerate breakfast and lunch.  Okay to come off telemetry monitor.  Appreciate psychiatric recommendations.  Still on legal hold.  6/29.  Afebrile.  Slightly tachycardic.  Episode of hypotension last night which has resolved.  On room air.  Still requiring significant Lorazepam per CIWA protocol.  Patient much more confused, irritable today.  Denies pain.  Accuses myself of stealing her money and looking  for shampoo.  Oriented.  Agitated on restraints.  Transferred to the ICU for severe alcohol withdrawals.  Psychiatry extended legal hold.    Consultants/Specialty  Psychiatry  ICU    Code Status  Full    Disposition  Legal hold    Review of Systems  Review of Systems   Unable to perform ROS: Mental acuity        Physical Exam  Temp:  [36.7 °C (98 °F)-37 °C (98.6 °F)] 36.8 °C (98.3 °F)  Pulse:  [] 102  Resp:  [16-17] 17  BP: ()/(58-72) 101/68  SpO2:  [95 %-99 %] 97 %    Physical Exam  Vitals signs and nursing note reviewed.   Constitutional:       General: She is not in acute distress.     Appearance: Normal appearance. She is normal weight.      Comments: Tearful   HENT:      Head: Normocephalic and atraumatic.      Right Ear: External ear normal.      Left Ear: External ear normal.      Nose: Nose normal.      Mouth/Throat:      Mouth: Mucous membranes are moist.      Pharynx: Oropharynx is clear. No oropharyngeal exudate or posterior oropharyngeal erythema.   Eyes:      Extraocular Movements: Extraocular movements intact.      Conjunctiva/sclera: Conjunctivae normal.   Neck:      Musculoskeletal: Normal range of motion and neck supple.   Cardiovascular:      Rate and Rhythm: Normal rate and regular rhythm.      Pulses: Normal pulses.      Heart sounds: Normal heart sounds. No murmur.   Pulmonary:      Effort: Pulmonary effort is normal. No respiratory distress.      Breath sounds: Normal breath sounds. No stridor. No wheezing or rales.   Abdominal:      General: Abdomen is flat. Bowel sounds are normal. There is no distension.      Palpations: Abdomen is soft. There is no mass.      Tenderness: There is no abdominal tenderness.   Musculoskeletal: Normal range of motion.   Skin:     General: Skin is warm.      Capillary Refill: Capillary refill takes less than 2 seconds.   Neurological:      General: No focal deficit present.      Mental Status: She is alert and oriented to person, place, and time.  Mental status is at baseline.      Cranial Nerves: No cranial nerve deficit.   Psychiatric:         Behavior: Behavior normal.      Comments: depressed mood.         Fluids  No intake or output data in the 24 hours ending 06/29/20 0715    Laboratory  Recent Labs     06/27/20  0328 06/28/20 0223 06/29/20  0248   WBC 5.0 4.4* 4.7*   RBC 4.54 4.25 4.06*   HEMOGLOBIN 14.0 13.0 12.5   HEMATOCRIT 41.7 39.4 38.2   MCV 91.9 92.7 94.1   MCH 30.8 30.6 30.8   MCHC 33.6 33.0* 32.7*   RDW 61.9* 61.8* 61.9*   PLATELETCT 52* 44* 47*   MPV 10.1 11.3 10.7     Recent Labs     06/27/20 0227 06/28/20 0223 06/29/20  0248   SODIUM 136 138 137   POTASSIUM 4.1 4.1 3.8   CHLORIDE 102 102 101   CO2 26 27 25   GLUCOSE 98 99 98   BUN 9 9 7*   CREATININE 0.69 0.67 0.64   CALCIUM 8.2* 8.9 8.9     Recent Labs     06/26/20  0850   APTT 31.2   INR 0.86*         Recent Labs     06/27/20 0227   TRIGLYCERIDE 35      LDL 34       Imaging  DX-CHEST-PORTABLE (1 VIEW)   Final Result      No acute cardiopulmonary abnormality.      CT-ABDOMEN-PELVIS WITH   Final Result         1. Hepatic steatosis.      2. There is a small sliding hiatal hernia otherwise the remainder of the CT scan of the abdomen and pelvis is unremarkable.           Assessment/Plan  * Suicide ideation- (present on admission)  Assessment & Plan  Patient did have a suicide attempt on 7/2019 propanolol overdose  Patient does have intentions of suicide with propanolol  Legal hold  Psychiatry consult    Alcohol dependence with intoxication (HCC)- (present on admission)  Assessment & Plan  Patient will be admitted to the telemetry unit with close cardiac monitoring on CIWA protocol  Started on rally bag, multivitamin, thiamine and folate  Replete electrolytes  Patient has been counseled on alcohol cessation and will be provided with information for outpatient detox facilities    Transfer to the ICU on 6/29/2020 for severe alcohol withdrawal    Gastroesophageal reflux disease with  esophagitis  Assessment & Plan  Start omeprazole     Essential hypertension- (present on admission)  Assessment & Plan  controlled  Continue current home propanolol  IV as needed medications have been ordered    Manic depressive illness (HCC)- (present on admission)  Assessment & Plan  Continue olanzapine, sertraline, divalproex, gabapentin, aripiprazole  Appreciate psychiatric input    Elevated liver function tests- (present on admission)  Assessment & Plan  Secondary to alcohol abuse  Continue to monitor    Tobacco abuse- (present on admission)  Assessment & Plan  -counseled for more than 4 minutes on tobacco cessation [id number]   -I have ordered nicotine replacement therapy      Thrombocytopenia (HCC)- (present on admission)  Assessment & Plan  Continue to trend    Hypothyroidism- (present on admission)  Assessment & Plan  Continue home thyroid medication     Gastrointestinal prophylaxis: Omeprazole  Antibiotics: None  Diet Order Regular (legal hold please send paperwear only)  Code status: FULL  Prognosis: Guarded  Risk: The Patient is at HIGH risk for complications and decompensation secondary to her multiple cormorbidities including alcohol withdrawal.  Suicidal ideations.  Depression.  Hypertension.    I have personally reviewed notes, labs, vitals, imaging.  I discussed the plan of care with bedside RN as well as on multidisciplinary rounds    Patient is critically ill.   The patient continues to have: Alcohol withdrawal, suicidal ideations  The vital organ system that is affected is the: Psychiatric  If untreated there is a high chance of deterioration into: Delirium tremens, seizures, and eventually death.   The critical care that I am providing today is: Call withdrawal  The critical that has been undertaken is medically complex.   There has been no overlap in critical care time.   Critical Care Time not including procedures: 34 minutes    I have performed a physical exam and reviewed and updated ROS and Plan  today (6/29/2020). In review of yesterday's note (6/28/2020), there are no changes except as documented above.     VTE prophylaxis: SCDs.  Avoid anticoagulation in setting of thrombocytopenia.

## 2020-06-29 NOTE — CONSULTS
Critical Care Consultation    Date of consult: 6/29/2020    Referring Physician  Jeremy M Gonda, M.D.    Reason for Consultation  Acute severe alcohol withdrawal syndrome    History of Presenting Illness  45 y.o. female who presented 6/26/2020 with a past medical history significant for bipolar disorder, depression and congestive heart failure who is brought in by EMS from [place] on 6/26 where she had originally presented for alcohol detox.  There she was found to be suicidal and was placed on a legal hold.  She apparently lost her daughter 3 weeks ago to a drunk  crash.  Patient has been drinking half to 1 gallon of liquor per day since then and had an intention to overdose on propranolol in a suicide attempt.  She also has been noncompliant with his Depakote for 3 weeks and reported some bright red emesis.  She was seen in the emergency department where she was diagnosed with alcoholism, suicidal ideation, and alcohol induced acute pancreatitis.  She was given IV fluids, Reglan, and a GI cocktail and admitted to the hospital.  Patient was seen by psychiatry and is continued on legal hold.  Unfortunately she went into alcohol withdrawal syndrome becoming too difficult to manage with standard CIWA protocol/Ativan and was transferred to the intensive care unit this afternoon where I was consulted for critical care management.    Code Status  Full Code    Review of Systems  Review of Systems   Unable to perform ROS: Mental status change       Past Medical History   has a past medical history of ADHD (attention deficit hyperactivity disorder), Bipolar affective disorder (HCC), Cancer (HCC), Congestive heart failure (HCC), Hypertension, Psychiatric disorder, Seizure disorder (HCC), and Thyroid condition.    Surgical History   has a past surgical history that includes tonsillectomy and thyroidectomy.    Family History  family history is not on file. - unknown    Social History   reports that she has been  smoking cigarettes. She has been smoking about 0.50 packs per day. She has never used smokeless tobacco. She reports current alcohol use. She reports current drug use. Drug: Inhaled.    Medications  Home Medications     Reviewed by Karey Hoskins (Pharmacy [facility]) on 06/26/20 at 1024  Med List Status: Complete   Medication Last Dose Status   divalproex ER (DEPAKOTE ER) 500 MG TABLET SR 24 HR UNK Active   gabapentin (NEURONTIN) 300 MG Cap UNK Active   levothyroxine (SYNTHROID) 75 MCG Tab UNK Active   olanzapine (ZYPREXA) 10 MG tablet UNK Active   omeprazole (PRILOSEC) 20 MG delayed-release capsule UNK Active   propranolol (INDERAL) 10 MG Tab UNK Active              Current Facility-Administered Medications   Medication Dose Route Frequency Provider Last Rate Last Dose   • nicotine polacrilex (NICORETTE) 2 MG piece 2 mg  2 mg Oral Q2HRS PRN Fransico Rahman M.D.   2 mg at 06/29/20 0358   • potassium chloride SA (Kdur) tablet 20 mEq  20 mEq Oral Once Wogn Sanchez D.O.       • dexmedetomidine (PRECEDEX) 400 mcg/100mL NS premix infusion  0.1-1 mcg/kg/hr Intravenous Continuous Jeremy M Gonda, M.D.       • LORazepam (ATIVAN) injection 2 mg  2 mg Intravenous Q4HRS Jeremy M Gonda, M.D.       • LORazepam (ATIVAN) injection 1-2 mg  1-2 mg Intravenous Q2HRS PRN Jeremy M Gonda, M.D.       • detox IV 1000 mL (D5LR + magnesium 1 g + thiamine 100 mg + folic acid 1 mg) infusion   Intravenous Once Jeremy M Gonda, M.D.        And   • [START ON 6/30/2020] thiamine tablet 100 mg  100 mg Oral DAILY Jeremy M Gonda, M.D.        And   • [START ON 6/30/2020] multivitamin (THERAGRAN) tablet 1 Tab  1 Tab Oral DAILY Jeremy M Gonda, M.D.        And   • [START ON 6/30/2020] folic acid (FOLVITE) tablet 1 mg  1 mg Oral DAILY Jeremy M Gonda, M.D.       • cloNIDine (CATAPRES) tablet 0.1 mg  0.1 mg Oral 4X/DAY PRN Jeremy M Gonda, M.D.       • divalproex ER (DEPAKOTE ER) tablet 500 mg  500 mg Oral QHS Elizabeth Rosado M.D.   500 mg at 06/28/20  "1953   • ARIPiprazole (ABILIFY) tablet 10 mg  10 mg Oral DAILY Elizabeth Rosado M.D.   10 mg at 06/29/20 0510   • sertraline (ZOLOFT) tablet 50 mg  50 mg Oral DAILY Elizabeth Rosado M.D.   50 mg at 06/29/20 0510   • lactated ringers infusion   Intravenous Continuous Devan Rahman M.D. 125 mL/hr at 06/29/20 0036     • acetaminophen (TYLENOL) tablet 650 mg  650 mg Oral Q6HRS PRN Devan Rahman M.D.   650 mg at 06/29/20 1033   • labetalol (NORMODYNE/TRANDATE) injection 10 mg  10 mg Intravenous Q4HRS PRN Devan Rahman M.D.       • ondansetron (ZOFRAN) syringe/vial injection 4 mg  4 mg Intravenous Q4HRS PRN Devan Rahman M.D.   Stopped at 06/28/20 0924   • ondansetron (ZOFRAN ODT) dispertab 4 mg  4 mg Oral Q4HRS PRN Devan Rahman M.D.   4 mg at 06/28/20 1226   • thiamine tablet 100 mg  100 mg Oral DAILY Devan Rahman M.D.   100 mg at 06/29/20 0508    And   • multivitamin (THERAGRAN) tablet 1 Tab  1 Tab Oral DAILY Devan Rahman M.D.   1 Tab at 06/29/20 0512    And   • folic acid (FOLVITE) tablet 1 mg  1 mg Oral DAILY Devan Rahman M.D.   1 mg at 06/29/20 0508   • gabapentin (NEURONTIN) capsule 300 mg  300 mg Oral TID Devan Rahman M.D.   300 mg at 06/29/20 0507   • levothyroxine (SYNTHROID) tablet 75 mcg  75 mcg Oral AM ES Devan Rahman M.D.   75 mcg at 06/29/20 0512   • OLANZapine (ZYPREXA) tablet 10 mg  10 mg Oral QHS Devan Rahman M.D.   10 mg at 06/28/20 1953   • omeprazole (PRILOSEC) capsule 20 mg  20 mg Oral BID Devan Rahman M.D.   20 mg at 06/29/20 0507   • propranolol (INDERAL) tablet 10 mg  10 mg Oral BID Devan Rahman M.D.   Stopped at 06/27/20 1800   • oxyCODONE immediate-release (ROXICODONE) tablet 5 mg  5 mg Oral Q4HRS PRN Devan Rahman M.D.   5 mg at 06/29/20 0722       Allergies  Allergies   Allergen Reactions   • Aspirin Anaphylaxis   • Compazine Swelling   • Sulfa Drugs Rash   • Tetracyclines Swelling   • Ultram [Tramadol Hcl] Swelling   • Food      \"Green Peppers\"       Vital Signs last 24 " hours  Temp:  [36.8 °C (98.3 °F)-37.1 °C (98.7 °F)] 37.1 °C (98.7 °F)  Pulse:  [] 88  Resp:  [14-17] 14  BP: ()/(58-69) 107/63  SpO2:  [95 %-98 %] 97 %    Physical Exam  Physical Exam  Vitals signs and nursing note reviewed.   Constitutional:       General: She is sleeping.      Appearance: She is normal weight. She is not toxic-appearing.      Interventions: She is restrained.      Comments: Disheveled   HENT:      Head: Normocephalic and atraumatic.      Right Ear: External ear normal.      Left Ear: External ear normal.      Nose: Nose normal. No congestion.      Mouth/Throat:      Mouth: Mucous membranes are dry.      Pharynx: Oropharynx is clear.   Eyes:      General: No scleral icterus.     Conjunctiva/sclera: Conjunctivae normal.      Pupils: Pupils are equal, round, and reactive to light.   Neck:      Musculoskeletal: Neck supple. No neck rigidity.      Comments: No meningismus  Cardiovascular:      Rate and Rhythm: Normal rate and regular rhythm.      Pulses: Normal pulses.      Heart sounds: Normal heart sounds.   Pulmonary:      Effort: Pulmonary effort is normal. No respiratory distress.      Breath sounds: Normal breath sounds. No wheezing or rhonchi.   Abdominal:      General: Bowel sounds are normal. There is no distension.      Palpations: Abdomen is soft.      Tenderness: There is no abdominal tenderness. There is no guarding.   Musculoskeletal:         General: No tenderness.      Right lower leg: No edema.      Left lower leg: No edema.   Skin:     General: Skin is warm and dry.      Capillary Refill: Capillary refill takes less than 2 seconds.      Findings: No rash.   Neurological:      General: No focal deficit present.      Mental Status: She is easily aroused. She is disoriented.      Cranial Nerves: No cranial nerve deficit.      Comments: Intermittent agitation   Psychiatric:         Behavior: Behavior is uncooperative.      Comments: Unable to assess given current clinical  condition         Fluids  No intake or output data in the 24 hours ending 06/29/20 1426    Laboratory  Recent Results (from the past 48 hour(s))   Basic Metabolic Panel    Collection Time: 06/28/20  2:23 AM   Result Value Ref Range    Sodium 138 135 - 145 mmol/L    Potassium 4.1 3.6 - 5.5 mmol/L    Chloride 102 96 - 112 mmol/L    Co2 27 20 - 33 mmol/L    Glucose 99 65 - 99 mg/dL    Bun 9 8 - 22 mg/dL    Creatinine 0.67 0.50 - 1.40 mg/dL    Calcium 8.9 8.5 - 10.5 mg/dL    Anion Gap 9.0 7.0 - 16.0   CBC WITH DIFFERENTIAL    Collection Time: 06/28/20  2:23 AM   Result Value Ref Range    WBC 4.4 (L) 4.8 - 10.8 K/uL    RBC 4.25 4.20 - 5.40 M/uL    Hemoglobin 13.0 12.0 - 16.0 g/dL    Hematocrit 39.4 37.0 - 47.0 %    MCV 92.7 81.4 - 97.8 fL    MCH 30.6 27.0 - 33.0 pg    MCHC 33.0 (L) 33.6 - 35.0 g/dL    RDW 61.8 (H) 35.9 - 50.0 fL    Platelet Count 44 (LL) 164 - 446 K/uL    MPV 11.3 9.0 - 12.9 fL    Neutrophils-Polys 54.60 44.00 - 72.00 %    Lymphocytes 30.20 22.00 - 41.00 %    Monocytes 4.80 0.00 - 13.40 %    Eosinophils 10.00 (H) 0.00 - 6.90 %    Basophils 0.20 0.00 - 1.80 %    Immature Granulocytes 0.20 0.00 - 0.90 %    Nucleated RBC 0.00 /100 WBC    Neutrophils (Absolute) 2.41 2.00 - 7.15 K/uL    Lymphs (Absolute) 1.33 1.00 - 4.80 K/uL    Monos (Absolute) 0.21 0.00 - 0.85 K/uL    Eos (Absolute) 0.44 0.00 - 0.51 K/uL    Baso (Absolute) 0.01 0.00 - 0.12 K/uL    Immature Granulocytes (abs) 0.01 0.00 - 0.11 K/uL    NRBC (Absolute) 0.00 K/uL   MAGNESIUM    Collection Time: 06/28/20  2:23 AM   Result Value Ref Range    Magnesium 1.9 1.5 - 2.5 mg/dL   PHOSPHORUS    Collection Time: 06/28/20  2:23 AM   Result Value Ref Range    Phosphorus 3.4 2.5 - 4.5 mg/dL   ESTIMATED GFR    Collection Time: 06/28/20  2:23 AM   Result Value Ref Range    GFR If African American >60 >60 mL/min/1.73 m 2    GFR If Non African American >60 >60 mL/min/1.73 m 2   Basic Metabolic Panel    Collection Time: 06/29/20  2:48 AM   Result Value Ref Range     Sodium 137 135 - 145 mmol/L    Potassium 3.8 3.6 - 5.5 mmol/L    Chloride 101 96 - 112 mmol/L    Co2 25 20 - 33 mmol/L    Glucose 98 65 - 99 mg/dL    Bun 7 (L) 8 - 22 mg/dL    Creatinine 0.64 0.50 - 1.40 mg/dL    Calcium 8.9 8.5 - 10.5 mg/dL    Anion Gap 11.0 7.0 - 16.0   CBC WITH DIFFERENTIAL    Collection Time: 06/29/20  2:48 AM   Result Value Ref Range    WBC 4.7 (L) 4.8 - 10.8 K/uL    RBC 4.06 (L) 4.20 - 5.40 M/uL    Hemoglobin 12.5 12.0 - 16.0 g/dL    Hematocrit 38.2 37.0 - 47.0 %    MCV 94.1 81.4 - 97.8 fL    MCH 30.8 27.0 - 33.0 pg    MCHC 32.7 (L) 33.6 - 35.0 g/dL    RDW 61.9 (H) 35.9 - 50.0 fL    Platelet Count 47 (LL) 164 - 446 K/uL    MPV 10.7 9.0 - 12.9 fL    Neutrophils-Polys 59.90 44.00 - 72.00 %    Lymphocytes 24.80 22.00 - 41.00 %    Monocytes 4.70 0.00 - 13.40 %    Eosinophils 10.00 (H) 0.00 - 6.90 %    Basophils 0.20 0.00 - 1.80 %    Immature Granulocytes 0.40 0.00 - 0.90 %    Nucleated RBC 0.00 /100 WBC    Neutrophils (Absolute) 2.80 2.00 - 7.15 K/uL    Lymphs (Absolute) 1.16 1.00 - 4.80 K/uL    Monos (Absolute) 0.22 0.00 - 0.85 K/uL    Eos (Absolute) 0.47 0.00 - 0.51 K/uL    Baso (Absolute) 0.01 0.00 - 0.12 K/uL    Immature Granulocytes (abs) 0.02 0.00 - 0.11 K/uL    NRBC (Absolute) 0.00 K/uL   PHOSPHORUS    Collection Time: 06/29/20  2:48 AM   Result Value Ref Range    Phosphorus 3.1 2.5 - 4.5 mg/dL   MAGNESIUM    Collection Time: 06/29/20  2:48 AM   Result Value Ref Range    Magnesium 1.9 1.5 - 2.5 mg/dL   ESTIMATED GFR    Collection Time: 06/29/20  2:48 AM   Result Value Ref Range    GFR If African American >60 >60 mL/min/1.73 m 2    GFR If Non African American >60 >60 mL/min/1.73 m 2       Imaging  IR-MIDLINE CATHETER INSERTION WO GUIDANCE > AGE 3   Final Result                  Ultrasound-guided midline placement performed by qualified nursing staff    as above.          DX-CHEST-PORTABLE (1 VIEW)   Final Result      No acute cardiopulmonary abnormality.      CT-ABDOMEN-PELVIS WITH    Final Result         1. Hepatic steatosis.      2. There is a small sliding hiatal hernia otherwise the remainder of the CT scan of the abdomen and pelvis is unremarkable.          Assessment/Plan  * Suicide ideation- (present on admission)  Assessment & Plan  Continue legal hold  Psychiatry following    Alcohol dependence with intoxication (HCC)- (present on admission)  Assessment & Plan  Now with severe alcohol withdrawal syndrome  Transition from CIWA protocol to titration of Precedex infusion with scheduled and PRN Ativan monitoring RASS  Vitamin supplementation  Eventual alcohol cessation education and resources to be provided    Gastroesophageal reflux disease with esophagitis  Assessment & Plan  Proton pump inhibitor    Essential hypertension- (present on admission)  Assessment & Plan  Continue scheduled propanolol, PRN clonidine and labetalol for goal SBP less than 160    Manic depressive illness (HCC)- (present on admission)  Assessment & Plan  Psychiatry following  Continue Depakote, Abilify, Zyprexa    Elevated liver function tests- (present on admission)  Assessment & Plan  Likely secondary to alcohol abuse  Avoid hepatotoxins  Monitor    Electrolyte abnormality  Assessment & Plan  Replete potassium and magnesium, monitoring daily    Tobacco abuse- (present on admission)  Assessment & Plan  Nicotine replacement patch  Tobacco cessation education    Thrombocytopenia (HCC)- (present on admission)  Assessment & Plan  Monitor for bleeding  Daily CBC    Hypothyroidism- (present on admission)  Assessment & Plan  Synthroid 75 mcg oral daily      Discussed patient condition and risk of morbidity and/or mortality with RN, RT, Pharmacy, Charge nurse / hot rounds, Patient and sitter at bedside.    The patient remains critically ill.  Critical care time = 34 minutes in directly providing and coordinating critical care and extensive data review.  No time overlap and excludes procedures.

## 2020-06-29 NOTE — PROGRESS NOTES
Rec'd report from previous nurse regarding prior 12 hours.  POC reviewed with pt.  White board updated.  Pt verbalizes understanding.  Call light within reach.  Pt tolerated morning meds.    Alerted provider that patients legal hold expires at 0805 this morning

## 2020-06-29 NOTE — CARE PLAN
Problem: Safety  Goal: Will remain free from injury  Outcome: PROGRESSING AS EXPECTED  Goal: Will remain free from falls  Outcome: PROGRESSING AS EXPECTED     Problem: Bowel/Gastric:  Goal: Normal bowel function is maintained or improved  Outcome: PROGRESSING AS EXPECTED  Goal: Will not experience complications related to bowel motility  Outcome: PROGRESSING AS EXPECTED     Problem: Knowledge Deficit  Goal: Knowledge of disease process/condition, treatment plan, diagnostic tests, and medications will improve  Outcome: PROGRESSING AS EXPECTED     Problem: Communication  Goal: The ability to communicate needs accurately and effectively will improve  Outcome: PROGRESSING SLOWER THAN EXPECTED  Patient beginning to hallucinate and have delusions

## 2020-06-29 NOTE — CARE PLAN
Problem: Communication  Goal: The ability to communicate needs accurately and effectively will improve  Outcome: PROGRESSING AS EXPECTED  Note: Pt communicates needs effectively and calls for assistance appropriately.     Problem: Safety  Goal: Will remain free from falls  Outcome: PROGRESSING AS EXPECTED  Note: Pt verbalizes understanding of fall precautions and calls for assistance appropriately. Bed in lowest position and locked. Pt wearing non-slip socks, call light within reach. Sitter at bedside.

## 2020-06-30 ENCOUNTER — PATIENT OUTREACH (OUTPATIENT)
Dept: HEALTH INFORMATION MANAGEMENT | Facility: OTHER | Age: 45
End: 2020-06-30

## 2020-06-30 LAB
ANION GAP SERPL CALC-SCNC: 10 MMOL/L (ref 7–16)
BASOPHILS # BLD AUTO: 0.3 % (ref 0–1.8)
BASOPHILS # BLD: 0.01 K/UL (ref 0–0.12)
BUN SERPL-MCNC: 5 MG/DL (ref 8–22)
CALCIUM SERPL-MCNC: 8.8 MG/DL (ref 8.5–10.5)
CHLORIDE SERPL-SCNC: 106 MMOL/L (ref 96–112)
CO2 SERPL-SCNC: 23 MMOL/L (ref 20–33)
CREAT SERPL-MCNC: 0.57 MG/DL (ref 0.5–1.4)
EOSINOPHIL # BLD AUTO: 0.39 K/UL (ref 0–0.51)
EOSINOPHIL NFR BLD: 10.5 % (ref 0–6.9)
ERYTHROCYTE [DISTWIDTH] IN BLOOD BY AUTOMATED COUNT: 61.1 FL (ref 35.9–50)
GLUCOSE SERPL-MCNC: 119 MG/DL (ref 65–99)
HCT VFR BLD AUTO: 39.1 % (ref 37–47)
HGB BLD-MCNC: 12.6 G/DL (ref 12–16)
IMM GRANULOCYTES # BLD AUTO: 0.01 K/UL (ref 0–0.11)
IMM GRANULOCYTES NFR BLD AUTO: 0.3 % (ref 0–0.9)
LYMPHOCYTES # BLD AUTO: 0.93 K/UL (ref 1–4.8)
LYMPHOCYTES NFR BLD: 25.1 % (ref 22–41)
MAGNESIUM SERPL-MCNC: 2.3 MG/DL (ref 1.5–2.5)
MCH RBC QN AUTO: 30.6 PG (ref 27–33)
MCHC RBC AUTO-ENTMCNC: 32.2 G/DL (ref 33.6–35)
MCV RBC AUTO: 94.9 FL (ref 81.4–97.8)
MONOCYTES # BLD AUTO: 0.25 K/UL (ref 0–0.85)
MONOCYTES NFR BLD AUTO: 6.8 % (ref 0–13.4)
NEUTROPHILS # BLD AUTO: 2.11 K/UL (ref 2–7.15)
NEUTROPHILS NFR BLD: 57 % (ref 44–72)
NRBC # BLD AUTO: 0 K/UL
NRBC BLD-RTO: 0 /100 WBC
PHOSPHATE SERPL-MCNC: 3.1 MG/DL (ref 2.5–4.5)
PLATELET # BLD AUTO: 51 K/UL (ref 164–446)
PMV BLD AUTO: 10.8 FL (ref 9–12.9)
POTASSIUM SERPL-SCNC: 4.2 MMOL/L (ref 3.6–5.5)
RBC # BLD AUTO: 4.12 M/UL (ref 4.2–5.4)
SODIUM SERPL-SCNC: 139 MMOL/L (ref 135–145)
WBC # BLD AUTO: 3.7 K/UL (ref 4.8–10.8)

## 2020-06-30 PROCEDURE — 700102 HCHG RX REV CODE 250 W/ 637 OVERRIDE(OP): Performed by: PSYCHIATRY & NEUROLOGY

## 2020-06-30 PROCEDURE — 99291 CRITICAL CARE FIRST HOUR: CPT | Performed by: INTERNAL MEDICINE

## 2020-06-30 PROCEDURE — 770022 HCHG ROOM/CARE - ICU (200)

## 2020-06-30 PROCEDURE — 83735 ASSAY OF MAGNESIUM: CPT

## 2020-06-30 PROCEDURE — 700111 HCHG RX REV CODE 636 W/ 250 OVERRIDE (IP): Performed by: INTERNAL MEDICINE

## 2020-06-30 PROCEDURE — 700105 HCHG RX REV CODE 258: Performed by: HOSPITALIST

## 2020-06-30 PROCEDURE — 700105 HCHG RX REV CODE 258: Performed by: INTERNAL MEDICINE

## 2020-06-30 PROCEDURE — 80048 BASIC METABOLIC PNL TOTAL CA: CPT

## 2020-06-30 PROCEDURE — 700101 HCHG RX REV CODE 250: Performed by: INTERNAL MEDICINE

## 2020-06-30 PROCEDURE — 700111 HCHG RX REV CODE 636 W/ 250 OVERRIDE (IP): Performed by: HOSPITALIST

## 2020-06-30 PROCEDURE — 700102 HCHG RX REV CODE 250 W/ 637 OVERRIDE(OP): Performed by: HOSPITALIST

## 2020-06-30 PROCEDURE — 700102 HCHG RX REV CODE 250 W/ 637 OVERRIDE(OP): Performed by: INTERNAL MEDICINE

## 2020-06-30 PROCEDURE — 84100 ASSAY OF PHOSPHORUS: CPT

## 2020-06-30 PROCEDURE — A9270 NON-COVERED ITEM OR SERVICE: HCPCS | Performed by: INTERNAL MEDICINE

## 2020-06-30 PROCEDURE — A9270 NON-COVERED ITEM OR SERVICE: HCPCS | Performed by: HOSPITALIST

## 2020-06-30 PROCEDURE — 85025 COMPLETE CBC W/AUTO DIFF WBC: CPT

## 2020-06-30 PROCEDURE — A9270 NON-COVERED ITEM OR SERVICE: HCPCS | Performed by: PSYCHIATRY & NEUROLOGY

## 2020-06-30 RX ADMIN — SODIUM CHLORIDE, POTASSIUM CHLORIDE, SODIUM LACTATE AND CALCIUM CHLORIDE: 600; 310; 30; 20 INJECTION, SOLUTION INTRAVENOUS at 01:26

## 2020-06-30 RX ADMIN — OXYCODONE 5 MG: 5 TABLET ORAL at 14:36

## 2020-06-30 RX ADMIN — LORAZEPAM 1 MG: 2 INJECTION INTRAMUSCULAR; INTRAVENOUS at 09:18

## 2020-06-30 RX ADMIN — DEXMEDETOMIDINE HYDROCHLORIDE 1.5 MCG/KG/HR: 100 INJECTION, SOLUTION INTRAVENOUS at 03:52

## 2020-06-30 RX ADMIN — SODIUM CHLORIDE, POTASSIUM CHLORIDE, SODIUM LACTATE AND CALCIUM CHLORIDE: 600; 310; 30; 20 INJECTION, SOLUTION INTRAVENOUS at 09:13

## 2020-06-30 RX ADMIN — LORAZEPAM 2 MG: 2 INJECTION INTRAMUSCULAR; INTRAVENOUS at 14:30

## 2020-06-30 RX ADMIN — HALOPERIDOL LACTATE 5 MG: 5 INJECTION, SOLUTION INTRAMUSCULAR at 03:02

## 2020-06-30 RX ADMIN — DEXMEDETOMIDINE HYDROCHLORIDE 1.5 MCG/KG/HR: 100 INJECTION, SOLUTION INTRAVENOUS at 08:09

## 2020-06-30 RX ADMIN — OXYCODONE 5 MG: 5 TABLET ORAL at 19:24

## 2020-06-30 RX ADMIN — SODIUM CHLORIDE, POTASSIUM CHLORIDE, SODIUM LACTATE AND CALCIUM CHLORIDE: 600; 310; 30; 20 INJECTION, SOLUTION INTRAVENOUS at 19:27

## 2020-06-30 RX ADMIN — NICOTINE POLACRILEX 2 MG: 2 GUM, CHEWING BUCCAL at 20:50

## 2020-06-30 RX ADMIN — GABAPENTIN 300 MG: 300 CAPSULE ORAL at 20:54

## 2020-06-30 RX ADMIN — OLANZAPINE 10 MG: 5 TABLET, FILM COATED ORAL at 20:54

## 2020-06-30 RX ADMIN — ONDANSETRON 4 MG: 2 INJECTION INTRAMUSCULAR; INTRAVENOUS at 17:36

## 2020-06-30 RX ADMIN — LORAZEPAM 2 MG: 2 INJECTION INTRAMUSCULAR; INTRAVENOUS at 22:21

## 2020-06-30 RX ADMIN — LORAZEPAM 2 MG: 2 INJECTION INTRAMUSCULAR; INTRAVENOUS at 06:12

## 2020-06-30 RX ADMIN — NICOTINE POLACRILEX 2 MG: 2 GUM, CHEWING BUCCAL at 23:33

## 2020-06-30 RX ADMIN — DIVALPROEX SODIUM 500 MG: 500 TABLET, FILM COATED, EXTENDED RELEASE ORAL at 20:54

## 2020-06-30 RX ADMIN — LORAZEPAM 2 MG: 2 INJECTION INTRAMUSCULAR; INTRAVENOUS at 01:27

## 2020-06-30 NOTE — CARE PLAN
Problem: Psychosocial Needs:  Goal: Level of anxiety will decrease  Outcome: PROGRESSING SLOWER THAN EXPECTED     Problem: Safety - Medical Restraint  Goal: Remains free of injury from restraints (Restraint for Interference with Medical Device)  Description: INTERVENTIONS:  1. Determine that other, less restrictive measures have been tried or would not be effective before applying the restraint  2. Evaluate the patient's condition at the time of restraint application  3. Inform patient/family regarding the reason for restraint  4. Q2H: Monitor safety, psychosocial status, comfort, nutrition and hydration  Outcome: PROGRESSING AS EXPECTED  Flowsheets (Taken 6/30/2020 1246)  Addressed this shift: Remains free of injury from restraints (restraint for interference with medical device):   Determine that other, less restrictive measures have been tried or would not be effective before applying the restraint   Every 2 hours: Monitor safety, psychosocial status, comfort, nutrition and hydration

## 2020-06-30 NOTE — DISCHARGE PLANNING
Anticipated Discharge Disposition: inpatient psych facility    Action: per chart review, pt not medically cleared due to precedex gtt and requiring higher level of care for ETOH withdrawal.    Barriers to Discharge: medical clearance, legal hold    Plan: TBD

## 2020-06-30 NOTE — PROGRESS NOTES
Critical Care Progress Note    Date of admission  6/26/2020    Chief Complaint  45 y.o. female admitted 6/26/2020 with ETOH abuse and SI    Hospital Course    45 y.o. female who presented 6/26/2020 with a past medical history significant for bipolar disorder, depression and congestive heart failure who is brought in by EMS from [place] on 6/26 where she had originally presented for alcohol detox.  There she was found to be suicidal and was placed on a legal hold.  She apparently lost her daughter 3 weeks ago to a drunk  crash.  Patient has been drinking half to 1 gallon of liquor per day since then and had an intention to overdose on propranolol in a suicide attempt.  She also has been noncompliant with her Depakote for 3 weeks and reported some bright red emesis.  She was seen in the emergency department where she was diagnosed with alcoholism, suicidal ideation, and alcohol induced acute pancreatitis.  She was given IV fluids, Reglan, and a GI cocktail and admitted to the hospital.  Patient was seen by psychiatry and is continued on legal hold.  Unfortunately she went into alcohol withdrawal syndrome becoming too difficult to manage with standard CIWA protocol/Ativan and was transferred to the intensive care unit this afternoon where I was consulted for critical care management.      Interval Problem Update  Reviewed last 24 hour events:   - remains on high dose precedex gtt   - AF, WBC down to 3.7   - sinus tiffanie, -150   - last BM 6/28   - good UOP   - NS @ 125 --> decreased to 100   - sitter at bedside   - scheduled ativan, no librium given due to poor PO intake    Review of Systems  Review of Systems   Unable to perform ROS: Mental status change        Vital Signs for last 24 hours   Temp:  [35.7 °C (96.3 °F)-37.1 °C (98.7 °F)] 35.7 °C (96.3 °F)  Pulse:  [] 60  Resp:  [14-30] 29  BP: (106-143)/() 143/94  SpO2:  [93 %-100 %] 99 %    Respiratory Information for the last 24 hours   Room  air    Physical Exam   Physical Exam  Vitals signs and nursing note reviewed.   Constitutional:       General: She is sleeping.      Appearance: She is normal weight. She is ill-appearing.      Interventions: She is sedated and restrained.   HENT:      Head: Normocephalic and atraumatic.      Right Ear: External ear normal.      Left Ear: External ear normal.      Nose: Nose normal. No congestion.      Mouth/Throat:      Mouth: Mucous membranes are dry.      Pharynx: Oropharynx is clear. No oropharyngeal exudate.   Eyes:      General: No scleral icterus.     Conjunctiva/sclera: Conjunctivae normal.      Pupils: Pupils are equal, round, and reactive to light.   Neck:      Musculoskeletal: Neck supple. No neck rigidity.      Comments: No meningismus  Cardiovascular:      Rate and Rhythm: Regular rhythm. Bradycardia present. Frequent extrasystoles are present.     Chest Wall: PMI is not displaced.      Pulses: Normal pulses.      Heart sounds: No murmur.   Pulmonary:      Effort: No tachypnea, accessory muscle usage or respiratory distress.      Breath sounds: Transmitted upper airway sounds present. Examination of the left-lower field reveals rhonchi. Rhonchi present. No wheezing or rales.      Comments: Gag intact, protecting airway currently  Abdominal:      General: Bowel sounds are normal. There is no distension.      Palpations: Abdomen is soft.      Tenderness: There is no abdominal tenderness. There is no guarding.   Genitourinary:     Comments: Early catheter in place  Musculoskeletal:         General: No tenderness.      Right lower leg: No edema.      Left lower leg: No edema.   Skin:     General: Skin is warm and dry.      Capillary Refill: Capillary refill takes less than 2 seconds.      Findings: No rash.   Neurological:      General: No focal deficit present.      Mental Status: She is disoriented.      Cranial Nerves: No cranial nerve deficit.   Psychiatric:         Behavior: Behavior is uncooperative.       Comments: Vacillating between agitation and drowsiness         Medications  Current Facility-Administered Medications   Medication Dose Route Frequency Provider Last Rate Last Dose   • nicotine polacrilex (NICORETTE) 2 MG piece 2 mg  2 mg Oral Q2HRS PRN Fransico Rahman M.D.   2 mg at 06/29/20 0358   • potassium chloride SA (Kdur) tablet 20 mEq  20 mEq Oral Once Wong Sanchez D.O.       • dexmedetomidine (PRECEDEX) 400 mcg/100mL NS premix infusion  0.1-1.5 mcg/kg/hr Intravenous Continuous Jeremy M Gonda, M.D. 26.4 mL/hr at 06/30/20 0352 1.5 mcg/kg/hr at 06/30/20 0352   • LORazepam (ATIVAN) injection 2 mg  2 mg Intravenous Q4HRS Jeremy M Gonda, M.D.   2 mg at 06/30/20 0612   • LORazepam (ATIVAN) injection 1-2 mg  1-2 mg Intravenous Q2HRS PRN Jeremy M Gonda, M.D.   2 mg at 06/29/20 2059   • thiamine tablet 100 mg  100 mg Oral DAILY Jeremy M Gonda, M.D.   Stopped at 06/30/20 0600    And   • multivitamin (THERAGRAN) tablet 1 Tab  1 Tab Oral DAILY Jeremy M Gonda, M.D.   Stopped at 06/30/20 0600    And   • folic acid (FOLVITE) tablet 1 mg  1 mg Oral DAILY Jeremy M Gonda, M.D.   Stopped at 06/30/20 0600   • cloNIDine (CATAPRES) tablet 0.1 mg  0.1 mg Oral 4X/DAY PRN Jeremy M Gonda, M.D.       • chlordiazePOXIDE (LIBRIUM) capsule 25 mg  25 mg Oral Q8HRS Jeremy M Gonda, M.D.   Stopped at 06/29/20 1600   • haloperidol lactate (HALDOL) injection 5 mg  5 mg Intravenous Q4HRS PRN Jeremy M Gonda, M.D.   5 mg at 06/30/20 0302   • divalproex ER (DEPAKOTE ER) tablet 500 mg  500 mg Oral QHS Elizabeth Rosado M.D.   Stopped at 06/29/20 2100   • ARIPiprazole (ABILIFY) tablet 10 mg  10 mg Oral DAILY Elizabeth Rosado M.D.   Stopped at 06/30/20 0600   • sertraline (ZOLOFT) tablet 50 mg  50 mg Oral DAILY Elizabeth Rosado M.D.   Stopped at 06/30/20 0600   • lactated ringers infusion   Intravenous Continuous Devan Rahman M.D. 125 mL/hr at 06/30/20 0126     • acetaminophen (TYLENOL) tablet 650 mg  650 mg Oral Q6HRS PRN Devan  ANTOLIN Rahman   650 mg at 06/29/20 1033   • labetalol (NORMODYNE/TRANDATE) injection 10 mg  10 mg Intravenous Q4HRS PRN Devan Rahman M.D.       • ondansetron (ZOFRAN) syringe/vial injection 4 mg  4 mg Intravenous Q4HRS PRN Devan Rahman M.D.   Stopped at 06/28/20 0924   • ondansetron (ZOFRAN ODT) dispertab 4 mg  4 mg Oral Q4HRS PRN Devan Rahman M.D.   4 mg at 06/28/20 1226   • gabapentin (NEURONTIN) capsule 300 mg  300 mg Oral TID Devan Rahman M.D.   Stopped at 06/29/20 2200   • levothyroxine (SYNTHROID) tablet 75 mcg  75 mcg Oral AM ES Devan Rahman M.D.   Stopped at 06/30/20 0600   • OLANZapine (ZYPREXA) tablet 10 mg  10 mg Oral QHS Devan Rahman M.D.   Stopped at 06/29/20 2100   • omeprazole (PRILOSEC) capsule 20 mg  20 mg Oral BID Devan Rahman M.D.   Stopped at 06/29/20 1800   • propranolol (INDERAL) tablet 10 mg  10 mg Oral BID Devan Rahman M.D.   Stopped at 06/27/20 1800   • oxyCODONE immediate-release (ROXICODONE) tablet 5 mg  5 mg Oral Q4HRS PRN Devan Rahman M.D.   5 mg at 06/29/20 0722       Fluids    Intake/Output Summary (Last 24 hours) at 6/30/2020 0712  Last data filed at 6/29/2020 1800  Gross per 24 hour   Intake 323.61 ml   Output --   Net 323.61 ml       Laboratory          Recent Labs     06/28/20 0223 06/29/20  0248 06/30/20  0515   SODIUM 138 137 139   POTASSIUM 4.1 3.8 4.2   CHLORIDE 102 101 106   CO2 27 25 23   BUN 9 7* 5*   CREATININE 0.67 0.64 0.57   MAGNESIUM 1.9 1.9 2.3   PHOSPHORUS 3.4 3.1 3.1   CALCIUM 8.9 8.9 8.8     Recent Labs     06/28/20 0223 06/29/20 0248 06/30/20  0515   GLUCOSE 99 98 119*     Recent Labs     06/28/20 0223 06/29/20 0248 06/30/20  0515   WBC 4.4* 4.7* 3.7*   NEUTSPOLYS 54.60 59.90 57.00   LYMPHOCYTES 30.20 24.80 25.10   MONOCYTES 4.80 4.70 6.80   EOSINOPHILS 10.00* 10.00* 10.50*   BASOPHILS 0.20 0.20 0.30     Recent Labs     06/28/20 0223 06/29/20 0248 06/30/20  0515   RBC 4.25 4.06* 4.12*   HEMOGLOBIN 13.0 12.5 12.6   HEMATOCRIT 39.4 38.2 39.1    PLATELETCT 44* 47* 51*       Imaging  X-Ray:  No film today    Assessment/Plan  * Suicide ideation- (present on admission)  Assessment & Plan  Continue legal hold  Psychiatry following    Alcohol dependence with intoxication (HCC)- (present on admission)  Assessment & Plan  Now with ongoing severe alcohol withdrawal syndrome  Continue to titrate Precedex drip, scheduled Ativan with PRN Ativan monitoring RASS (goal 0 to +1)  Continue vitamin supplementation  Eventual alcohol cessation education and resources to be provided    Gastroesophageal reflux disease with esophagitis  Assessment & Plan  Self-reported hematemesis  Proton pump inhibitor  Hold chemical DVT prophylaxis for now, use SCDs only    Essential hypertension- (present on admission)  Assessment & Plan  Continue scheduled propanolol once able to take pills versus via core track if needed  PRN clonidine and labetalol for goal SBP less than 160    Manic depressive illness (HCC)- (present on admission)  Assessment & Plan  Psychiatry following  Continue Depakote, Abilify, Zyprexa    Elevated liver function tests- (present on admission)  Assessment & Plan  Likely secondary to alcohol abuse  Avoid hepatotoxins  Monitor    Electrolyte abnormality  Assessment & Plan  Replete potassium and magnesium, monitoring daily    Tobacco abuse- (present on admission)  Assessment & Plan  Nicotine replacement patch  Tobacco cessation education    Thrombocytopenia (HCC)- (present on admission)  Assessment & Plan  Slowly improving  Monitor for bleeding  Daily CBC    Hypothyroidism- (present on admission)  Assessment & Plan  Continue Synthroid 75 mcg oral daily       VTE:  Contraindicated - due to thrombocytopenia and reported hematemesis  Ulcer: PPI  Lines: Early Catheter  Ongoing indication addressed    I have performed a physical exam and reviewed and updated ROS and Plan today (6/30/2020). In review of yesterday's note (6/29/2020), there are no changes except as documented  above.     Patient remains critically ill today requiring active titration of the Precedex drip for ongoing alcohol withdrawal syndrome with close airway and aspiration monitoring. High risk of deterioration and worsening vital organ dysfunction and death without the above critical care interventions.    Discussed patient condition and risk of morbidity and/or mortality with RN, RT, Pharmacy, Charge nurse / hot rounds, Patient and sitter  The patient remains critically ill.  Critical care time = 37 minutes in directly providing and coordinating critical care and extensive data review.  No time overlap and excludes procedures.

## 2020-06-30 NOTE — DOCUMENTATION QUERY
"                                                                         [facility]                                                                       Query Response Note      PATIENT:               DANIELA WEEKS  ACCT #:                  [id number]  MRN:                     [id number]  :                      1975  ADMIT DATE:       2020 8:29 AM  DISCH DATE:          RESPONDING  PROVIDER #:        [id number]           QUERY TEXT:    \"Alcohol-induced acute pancreatitis\" is documented in the ED Note however, this condition is not documented in the H&P or subsequent notes. Please clarify status of this condition:    NOTE:  If an appropriate response is not listed below, please respond with a new note.     The patient's Clinical Indicators include:  ED Note: Alcohol-induced acute pancreatitis, unspecified complication status    Lipase: 239   CT-Abd: Hepatic steatosis, hiatal hernia, otherwise unremarkable.   Lipase: 104  Treatment: Detox IV; GI cocktail; pain management; lab/imaging  Risk Factors: Alcohol dependence  Options provided:   -- Alcohol-induced acute pancreatitis is ruled in   -- Alcohol-induced acute pancreatitis is ruled out   -- Unable to determine      Query created by: Columba Rivera on 2020 7:33 AM    RESPONSE TEXT:    Alcohol-induced acute pancreatitis is ruled out          Electronically signed by:  TA BUNDY MD 2020 6:37 PM              "

## 2020-07-01 PROBLEM — F10.939 ALCOHOL WITHDRAWAL (HCC): Status: ACTIVE | Noted: 2019-07-30

## 2020-07-01 PROBLEM — R10.9 ABDOMINAL PAIN: Status: ACTIVE | Noted: 2020-07-01

## 2020-07-01 LAB
ANION GAP SERPL CALC-SCNC: 12 MMOL/L (ref 7–16)
BASOPHILS # BLD AUTO: 0.3 % (ref 0–1.8)
BASOPHILS # BLD: 0.02 K/UL (ref 0–0.12)
BUN SERPL-MCNC: 6 MG/DL (ref 8–22)
CALCIUM SERPL-MCNC: 8.3 MG/DL (ref 8.5–10.5)
CHLORIDE SERPL-SCNC: 103 MMOL/L (ref 96–112)
CO2 SERPL-SCNC: 23 MMOL/L (ref 20–33)
CREAT SERPL-MCNC: 0.69 MG/DL (ref 0.5–1.4)
EOSINOPHIL # BLD AUTO: 0.26 K/UL (ref 0–0.51)
EOSINOPHIL NFR BLD: 3.4 % (ref 0–6.9)
ERYTHROCYTE [DISTWIDTH] IN BLOOD BY AUTOMATED COUNT: 60.6 FL (ref 35.9–50)
GLUCOSE SERPL-MCNC: 89 MG/DL (ref 65–99)
HCT VFR BLD AUTO: 35.8 % (ref 37–47)
HGB BLD-MCNC: 11.9 G/DL (ref 12–16)
IMM GRANULOCYTES # BLD AUTO: 0.04 K/UL (ref 0–0.11)
IMM GRANULOCYTES NFR BLD AUTO: 0.5 % (ref 0–0.9)
LYMPHOCYTES # BLD AUTO: 0.82 K/UL (ref 1–4.8)
LYMPHOCYTES NFR BLD: 10.6 % (ref 22–41)
MCH RBC QN AUTO: 31.1 PG (ref 27–33)
MCHC RBC AUTO-ENTMCNC: 33.2 G/DL (ref 33.6–35)
MCV RBC AUTO: 93.5 FL (ref 81.4–97.8)
MONOCYTES # BLD AUTO: 0.5 K/UL (ref 0–0.85)
MONOCYTES NFR BLD AUTO: 6.5 % (ref 0–13.4)
NEUTROPHILS # BLD AUTO: 6.06 K/UL (ref 2–7.15)
NEUTROPHILS NFR BLD: 78.7 % (ref 44–72)
NRBC # BLD AUTO: 0 K/UL
NRBC BLD-RTO: 0 /100 WBC
PLATELET # BLD AUTO: 78 K/UL (ref 164–446)
PMV BLD AUTO: 10.9 FL (ref 9–12.9)
POTASSIUM SERPL-SCNC: 3.9 MMOL/L (ref 3.6–5.5)
RBC # BLD AUTO: 3.83 M/UL (ref 4.2–5.4)
SODIUM SERPL-SCNC: 138 MMOL/L (ref 135–145)
WBC # BLD AUTO: 7.7 K/UL (ref 4.8–10.8)

## 2020-07-01 PROCEDURE — 700102 HCHG RX REV CODE 250 W/ 637 OVERRIDE(OP): Performed by: HOSPITALIST

## 2020-07-01 PROCEDURE — 99232 SBSQ HOSP IP/OBS MODERATE 35: CPT | Performed by: INTERNAL MEDICINE

## 2020-07-01 PROCEDURE — 700111 HCHG RX REV CODE 636 W/ 250 OVERRIDE (IP): Performed by: INTERNAL MEDICINE

## 2020-07-01 PROCEDURE — 80048 BASIC METABOLIC PNL TOTAL CA: CPT

## 2020-07-01 PROCEDURE — A9270 NON-COVERED ITEM OR SERVICE: HCPCS | Performed by: INTERNAL MEDICINE

## 2020-07-01 PROCEDURE — 700102 HCHG RX REV CODE 250 W/ 637 OVERRIDE(OP): Performed by: INTERNAL MEDICINE

## 2020-07-01 PROCEDURE — 99232 SBSQ HOSP IP/OBS MODERATE 35: CPT | Performed by: HOSPITALIST

## 2020-07-01 PROCEDURE — 700102 HCHG RX REV CODE 250 W/ 637 OVERRIDE(OP): Performed by: PSYCHIATRY & NEUROLOGY

## 2020-07-01 PROCEDURE — A9270 NON-COVERED ITEM OR SERVICE: HCPCS | Performed by: HOSPITALIST

## 2020-07-01 PROCEDURE — 770001 HCHG ROOM/CARE - MED/SURG/GYN PRIV*

## 2020-07-01 PROCEDURE — A9270 NON-COVERED ITEM OR SERVICE: HCPCS | Performed by: PSYCHIATRY & NEUROLOGY

## 2020-07-01 PROCEDURE — 85025 COMPLETE CBC W/AUTO DIFF WBC: CPT

## 2020-07-01 PROCEDURE — 700105 HCHG RX REV CODE 258: Performed by: INTERNAL MEDICINE

## 2020-07-01 PROCEDURE — 700111 HCHG RX REV CODE 636 W/ 250 OVERRIDE (IP): Performed by: HOSPITALIST

## 2020-07-01 RX ORDER — LORAZEPAM 2 MG/ML
0.5 INJECTION INTRAMUSCULAR EVERY 4 HOURS PRN
Status: DISCONTINUED | OUTPATIENT
Start: 2020-07-01 | End: 2020-07-07 | Stop reason: HOSPADM

## 2020-07-01 RX ORDER — LORAZEPAM 2 MG/ML
1.5 INJECTION INTRAMUSCULAR
Status: DISCONTINUED | OUTPATIENT
Start: 2020-07-01 | End: 2020-07-07 | Stop reason: HOSPADM

## 2020-07-01 RX ORDER — LORAZEPAM 2 MG/ML
2 INJECTION INTRAMUSCULAR
Status: DISCONTINUED | OUTPATIENT
Start: 2020-07-01 | End: 2020-07-07 | Stop reason: HOSPADM

## 2020-07-01 RX ORDER — LORAZEPAM 2 MG/1
4 TABLET ORAL
Status: DISCONTINUED | OUTPATIENT
Start: 2020-07-01 | End: 2020-07-07 | Stop reason: HOSPADM

## 2020-07-01 RX ORDER — LORAZEPAM 2 MG/ML
1 INJECTION INTRAMUSCULAR
Status: DISCONTINUED | OUTPATIENT
Start: 2020-07-01 | End: 2020-07-07 | Stop reason: HOSPADM

## 2020-07-01 RX ORDER — LORAZEPAM 1 MG/1
0.5 TABLET ORAL EVERY 4 HOURS PRN
Status: DISCONTINUED | OUTPATIENT
Start: 2020-07-01 | End: 2020-07-07 | Stop reason: HOSPADM

## 2020-07-01 RX ORDER — LORAZEPAM 2 MG/1
2 TABLET ORAL
Status: DISCONTINUED | OUTPATIENT
Start: 2020-07-01 | End: 2020-07-07 | Stop reason: HOSPADM

## 2020-07-01 RX ORDER — LORAZEPAM 1 MG/1
1 TABLET ORAL EVERY 4 HOURS PRN
Status: DISCONTINUED | OUTPATIENT
Start: 2020-07-01 | End: 2020-07-07 | Stop reason: HOSPADM

## 2020-07-01 RX ADMIN — ARIPIPRAZOLE 10 MG: 10 TABLET ORAL at 05:43

## 2020-07-01 RX ADMIN — GABAPENTIN 300 MG: 300 CAPSULE ORAL at 05:43

## 2020-07-01 RX ADMIN — Medication 100 MG: at 05:43

## 2020-07-01 RX ADMIN — LEVOTHYROXINE SODIUM 75 MCG: 0.07 TABLET ORAL at 05:43

## 2020-07-01 RX ADMIN — SODIUM CHLORIDE, POTASSIUM CHLORIDE, SODIUM LACTATE AND CALCIUM CHLORIDE: 600; 310; 30; 20 INJECTION, SOLUTION INTRAVENOUS at 07:18

## 2020-07-01 RX ADMIN — LIDOCAINE HYDROCHLORIDE 30 ML: 20 SOLUTION OROPHARYNGEAL at 15:55

## 2020-07-01 RX ADMIN — FOLIC ACID 1 MG: 1 TABLET ORAL at 05:43

## 2020-07-01 RX ADMIN — OLANZAPINE 10 MG: 5 TABLET, FILM COATED ORAL at 20:21

## 2020-07-01 RX ADMIN — OXYCODONE 5 MG: 5 TABLET ORAL at 06:36

## 2020-07-01 RX ADMIN — PROPRANOLOL HYDROCHLORIDE 10 MG: 20 TABLET ORAL at 05:43

## 2020-07-01 RX ADMIN — LORAZEPAM 1 MG: 1 TABLET ORAL at 20:34

## 2020-07-01 RX ADMIN — OXYCODONE 5 MG: 5 TABLET ORAL at 15:54

## 2020-07-01 RX ADMIN — OXYCODONE 5 MG: 5 TABLET ORAL at 10:44

## 2020-07-01 RX ADMIN — SERTRALINE 50 MG: 100 TABLET, FILM COATED ORAL at 05:43

## 2020-07-01 RX ADMIN — LORAZEPAM 2 MG: 2 INJECTION INTRAMUSCULAR; INTRAVENOUS at 00:14

## 2020-07-01 RX ADMIN — GABAPENTIN 300 MG: 300 CAPSULE ORAL at 15:55

## 2020-07-01 RX ADMIN — THERA TABS 1 TABLET: TAB at 05:43

## 2020-07-01 RX ADMIN — ONDANSETRON 4 MG: 4 TABLET, ORALLY DISINTEGRATING ORAL at 20:34

## 2020-07-01 RX ADMIN — OMEPRAZOLE 20 MG: 20 CAPSULE, DELAYED RELEASE ORAL at 17:22

## 2020-07-01 RX ADMIN — DIVALPROEX SODIUM 500 MG: 500 TABLET, FILM COATED, EXTENDED RELEASE ORAL at 20:21

## 2020-07-01 RX ADMIN — OXYCODONE 5 MG: 5 TABLET ORAL at 02:32

## 2020-07-01 RX ADMIN — LORAZEPAM 2 MG: 2 INJECTION INTRAMUSCULAR; INTRAVENOUS at 05:43

## 2020-07-01 RX ADMIN — OXYCODONE 5 MG: 5 TABLET ORAL at 20:21

## 2020-07-01 RX ADMIN — LORAZEPAM 2 MG: 2 INJECTION INTRAMUSCULAR; INTRAVENOUS at 09:33

## 2020-07-01 RX ADMIN — OMEPRAZOLE 20 MG: 20 CAPSULE, DELAYED RELEASE ORAL at 05:43

## 2020-07-01 RX ADMIN — LORAZEPAM 2 MG: 2 INJECTION INTRAMUSCULAR; INTRAVENOUS at 02:32

## 2020-07-01 ASSESSMENT — LIFESTYLE VARIABLES
AGITATION: NORMAL ACTIVITY
TOTAL SCORE: 8
ORIENTATION AND CLOUDING OF SENSORIUM: ORIENTED AND CAN DO SERIAL ADDITIONS
TOTAL SCORE: VERY MILD ITCHING, PINS AND NEEDLES SENSATION, BURNING OR NUMBNESS
AUDITORY DISTURBANCES: NOT PRESENT
VISUAL DISTURBANCES: NOT PRESENT
PAROXYSMAL SWEATS: BARELY PERCEPTIBLE SWEATING. PALMS MOIST
HEADACHE, FULLNESS IN HEAD: MILD
SUBSTANCE_ABUSE: 0
TREMOR: NO TREMOR
ANXIETY: *
NAUSEA AND VOMITING: MILD NAUSEA WITH NO VOMITING

## 2020-07-01 ASSESSMENT — ENCOUNTER SYMPTOMS
FOCAL WEAKNESS: 0
FLANK PAIN: 0
DIZZINESS: 0
CHILLS: 0
BRUISES/BLEEDS EASILY: 0
ABDOMINAL PAIN: 1
COUGH: 0
DEPRESSION: 1
NAUSEA: 0
HEADACHES: 0
MYALGIAS: 0
DOUBLE VISION: 0
FEVER: 0
BACK PAIN: 1
SENSORY CHANGE: 0
DEPRESSION: 0
VOMITING: 0
ABDOMINAL PAIN: 0
NERVOUS/ANXIOUS: 0
HEARTBURN: 0
SHORTNESS OF BREATH: 0
HALLUCINATIONS: 0
HEMOPTYSIS: 0
WEAKNESS: 0
SPEECH CHANGE: 0
BLURRED VISION: 0
EYE DISCHARGE: 0
NAUSEA: 1
PALPITATIONS: 0

## 2020-07-01 NOTE — PROGRESS NOTES
D/W Dr. Gonda.    46yo female with suicidal ideation from [place]  Went into etoh w/d requiring 24 hours precedex drip  On ativan and doing better now.    Will assign to Dr. Javed.

## 2020-07-01 NOTE — PROGRESS NOTES
Pt is on Legal Hold 2000.    All personal items removed from room and kept in a secure area.    Hospital clothing only. Removed all sharps and potential dangerous items, telephone cords.  “Stop - Check with Nurse before entering” sign to be placed outside patient’s room    Finger foods and paper ware only on meal tray.  1:1 sitter at bedside.

## 2020-07-01 NOTE — PROGRESS NOTES
Monitor Summary:   SR-ST: 90-1teens  0.16/0.08/0.36    12 hour chart check   2 RN skin check complete

## 2020-07-01 NOTE — PROGRESS NOTES
"Bedside report received. Legal hold in place, safety sitter at bedside, Safety checklist filled out. Pt currently A&Ox4, VSS, very pleasant, denying any SI at time. States, \"  I am in better spirits and am taking this time to pray to God.\"  "

## 2020-07-01 NOTE — PROGRESS NOTES
Critical Care Progress Note    Date of admission  6/26/2020    Chief Complaint  45 y.o. female admitted 6/26/2020 with ETOH abuse and SI    Hospital Course    45 y.o. female who presented 6/26/2020 with a past medical history significant for bipolar disorder, depression and congestive heart failure who is brought in by EMS from [place] on 6/26 where she had originally presented for alcohol detox.  There she was found to be suicidal and was placed on a legal hold.  She apparently lost her daughter 3 weeks ago to a drunk  crash.  Patient has been drinking half to 1 gallon of liquor per day since then and had an intention to overdose on propranolol in a suicide attempt.  She also has been noncompliant with her Depakote for 3 weeks and reported some bright red emesis.  She was seen in the emergency department where she was diagnosed with alcoholism, suicidal ideation, and alcohol induced acute pancreatitis.  She was given IV fluids, Reglan, and a GI cocktail and admitted to the hospital.  Patient was seen by psychiatry and is continued on legal hold.  Unfortunately she went into alcohol withdrawal syndrome becoming too difficult to manage with standard CIWA protocol/Ativan and was transferred to the intensive care unit this afternoon where I was consulted for critical care management.      Interval Problem Update  Reviewed last 24 hour events:   -Able to be weaned off precedex gtt   -Afebrile, normal white count   -Alert and oriented x4   -Remains on legal hold   -Normal sinus rhythm, SBP    -Platelets up to 78,000   -Tolerating diet, good urine output    Review of Systems  Review of Systems   Constitutional: Positive for malaise/fatigue. Negative for fever.   HENT: Negative for congestion.    Eyes: Negative for blurred vision.   Respiratory: Negative for shortness of breath.    Cardiovascular: Negative for chest pain.   Gastrointestinal: Negative for abdominal pain, nausea and vomiting.   Genitourinary:  Negative for dysuria.   Musculoskeletal: Positive for back pain.   Skin: Negative for rash.   Neurological: Negative for dizziness and headaches.   Endo/Heme/Allergies: Does not bruise/bleed easily.   Psychiatric/Behavioral: Positive for depression and suicidal ideas. The patient is not nervous/anxious.    All other systems reviewed and are negative.       Vital Signs for last 24 hours   Temp:  [35.8 °C (96.5 °F)-36.8 °C (98.2 °F)] 36.8 °C (98.2 °F)  Pulse:  [] 119  Resp:  [0-43] 0  BP: ()/() 99/60  SpO2:  [89 %-100 %] 96 %    Respiratory Information for the last 24 hours   Remains on room air    Physical Exam   Physical Exam  Vitals signs and nursing note reviewed.   Constitutional:       General: She is sleeping.      Appearance: She is normal weight. She is not ill-appearing or toxic-appearing.   HENT:      Head: Normocephalic and atraumatic.      Nose: Nose normal.      Mouth/Throat:      Mouth: Mucous membranes are moist.      Pharynx: Oropharynx is clear. No oropharyngeal exudate.   Eyes:      General: No scleral icterus.     Pupils: Pupils are equal, round, and reactive to light.   Neck:      Musculoskeletal: Neck supple.      Comments: No meningismus  Cardiovascular:      Rate and Rhythm: Normal rate and regular rhythm. Frequent extrasystoles are present.     Chest Wall: PMI is not displaced.      Pulses: Normal pulses.      Heart sounds: Normal heart sounds. No murmur.   Pulmonary:      Effort: Pulmonary effort is normal. No tachypnea, accessory muscle usage or respiratory distress.      Breath sounds: Normal breath sounds. Transmitted upper airway sounds present. No wheezing.      Comments: Gag intact, protecting airway currently  Abdominal:      General: Bowel sounds are normal. There is no distension.      Palpations: Abdomen is soft.      Tenderness: There is no abdominal tenderness. There is no guarding.   Genitourinary:     Comments: Early catheter in place  Musculoskeletal:          General: No tenderness.      Right lower leg: No edema.      Left lower leg: No edema.   Skin:     General: Skin is warm and dry.      Capillary Refill: Capillary refill takes less than 2 seconds.      Coloration: Skin is not jaundiced.   Neurological:      General: No focal deficit present.      Mental Status: She is oriented to person, place, and time and easily aroused.      Cranial Nerves: No cranial nerve deficit.   Psychiatric:         Behavior: Behavior is cooperative.      Comments: Flat affect, depressed mood         Medications  Current Facility-Administered Medications   Medication Dose Route Frequency Provider Last Rate Last Dose   • nicotine polacrilex (NICORETTE) 2 MG piece 2 mg  2 mg Oral Q2HRS PRN Fransico Rahman M.D.   2 mg at 06/30/20 2333   • LORazepam (ATIVAN) injection 2 mg  2 mg Intravenous Q4HRS Jeremy M Gonda, M.D.   2 mg at 07/01/20 0543   • LORazepam (ATIVAN) injection 1-2 mg  1-2 mg Intravenous Q2HRS PRN Jeremy M Gonda, M.D.   2 mg at 07/01/20 0014   • thiamine tablet 100 mg  100 mg Oral DAILY Jeremy M Gonda, M.D.   100 mg at 07/01/20 0543    And   • multivitamin (THERAGRAN) tablet 1 Tab  1 Tab Oral DAILY Jeremy M Gonda, M.D.   1 Tab at 07/01/20 0543    And   • folic acid (FOLVITE) tablet 1 mg  1 mg Oral DAILY Jeremy M Gonda, M.D.   1 mg at 07/01/20 0543   • cloNIDine (CATAPRES) tablet 0.1 mg  0.1 mg Oral 4X/DAY PRN Jeremy M Gonda, M.D.       • haloperidol lactate (HALDOL) injection 5 mg  5 mg Intravenous Q4HRS PRN Jeremy M Gonda, M.D.   5 mg at 06/30/20 0302   • divalproex ER (DEPAKOTE ER) tablet 500 mg  500 mg Oral QHS Elizabeth Rosado M.D.   500 mg at 06/30/20 2054   • ARIPiprazole (ABILIFY) tablet 10 mg  10 mg Oral DAILY Elizabeth Rosado M.D.   10 mg at 07/01/20 0543   • sertraline (ZOLOFT) tablet 50 mg  50 mg Oral DAILY Elizabeth Rosado M.D.   50 mg at 07/01/20 0543   • lactated ringers infusion   Intravenous Continuous Jeremy M Gonda, M.D. 100 mL/hr at 06/30/20 1927     •  acetaminophen (TYLENOL) tablet 650 mg  650 mg Oral Q6HRS PRN Devan Rahman M.D.   650 mg at 06/29/20 1033   • labetalol (NORMODYNE/TRANDATE) injection 10 mg  10 mg Intravenous Q4HRS PRN LORRAINE KnutsonD.       • ondansetron (ZOFRAN) syringe/vial injection 4 mg  4 mg Intravenous Q4HRS PRN LORRAINE KnutsonDGeena   4 mg at 06/30/20 1736   • ondansetron (ZOFRAN ODT) dispertab 4 mg  4 mg Oral Q4HRS PRN LORRAINE KnutsonDGeena   4 mg at 06/28/20 1226   • gabapentin (NEURONTIN) capsule 300 mg  300 mg Oral TID LORRAINE KnutsonDGeena   300 mg at 07/01/20 0543   • levothyroxine (SYNTHROID) tablet 75 mcg  75 mcg Oral AM ES Devan Rahman M.D.   75 mcg at 07/01/20 0543   • OLANZapine (ZYPREXA) tablet 10 mg  10 mg Oral QHS LORRAINE KnutsonDGeena   10 mg at 06/30/20 2054   • omeprazole (PRILOSEC) capsule 20 mg  20 mg Oral BID LORRAINE KnutsonDGeena   20 mg at 07/01/20 0543   • propranolol (INDERAL) tablet 10 mg  10 mg Oral BID LORRAINE KnutsonDGeena   10 mg at 07/01/20 0543   • oxyCODONE immediate-release (ROXICODONE) tablet 5 mg  5 mg Oral Q4HRS PRN LORRAINE KnutsonDGeena   5 mg at 07/01/20 0636       Fluids    Intake/Output Summary (Last 24 hours) at 7/1/2020 0659  Last data filed at 7/1/2020 0600  Gross per 24 hour   Intake 5264.11 ml   Output 2585 ml   Net 2679.11 ml       Laboratory          Recent Labs     06/29/20  0248 06/30/20  0515 07/01/20  0542   SODIUM 137 139 138   POTASSIUM 3.8 4.2 3.9   CHLORIDE 101 106 103   CO2 25 23 23   BUN 7* 5* 6*   CREATININE 0.64 0.57 0.69   MAGNESIUM 1.9 2.3  --    PHOSPHORUS 3.1 3.1  --    CALCIUM 8.9 8.8 8.3*     Recent Labs     06/29/20 0248 06/30/20  0515 07/01/20  0542   GLUCOSE 98 119* 89     Recent Labs     06/29/20 0248 06/30/20  0515 07/01/20  0542   WBC 4.7* 3.7* 7.7   NEUTSPOLYS 59.90 57.00 78.70*   LYMPHOCYTES 24.80 25.10 10.60*   MONOCYTES 4.70 6.80 6.50   EOSINOPHILS 10.00* 10.50* 3.40   BASOPHILS 0.20 0.30 0.30     Recent Labs     06/29/20 0248 06/30/20  0515 07/01/20  0542   RBC 4.06* 4.12* 3.83*    HEMOGLOBIN 12.5 12.6 11.9*   HEMATOCRIT 38.2 39.1 35.8*   PLATELETCT 47* 51* 78*       Imaging  X-Ray:  No film today 7/1    Assessment/Plan  * Alcohol withdrawal (HCC)- (present on admission)  Assessment & Plan  Severe alcohol withdrawal syndrome -improving  Discontinue Precedex drip,   Continue scheduled Ativan with PRN Ativan monitoring RASS (goal 0 to +1)  Status post vitamin supplementation  Eventual alcohol cessation education and resources to be provided    Suicide ideation- (present on admission)  Assessment & Plan  Continue legal hold  Psychiatry following    Gastroesophageal reflux disease with esophagitis  Assessment & Plan  Self-reported hematemesis  Proton pump inhibitor  Hold chemical DVT prophylaxis for now, use SCDs only    Essential hypertension- (present on admission)  Assessment & Plan  Controlled without medication at this time  Hold scheduled propanolol  PRN clonidine and labetalol for goal SBP less than 160    Manic depressive illness (HCC)- (present on admission)  Assessment & Plan  Psychiatry following  Continue Depakote, Abilify, Zyprexa    Elevated liver function tests- (present on admission)  Assessment & Plan  Likely secondary to alcohol abuse  Avoid hepatotoxins  Monitor    Abdominal pain  Assessment & Plan  Chronic  PRN oxycodone    Electrolyte abnormality  Assessment & Plan  Replete and monitor    Tobacco abuse- (present on admission)  Assessment & Plan  Nicotine replacement patch  Tobacco cessation education    Thrombocytopenia (HCC)- (present on admission)  Assessment & Plan  Improving again today  Monitor for bleeding  Daily CBC    Hypothyroidism- (present on admission)  Assessment & Plan  Continue Synthroid 75 mcg oral daily       VTE:  Contraindicated - due to thrombocytopenia and reported hematemesis  Ulcer: PPI  Lines: Early Catheter  To be removed today    I have performed a physical exam and reviewed and updated ROS and Plan today (7/1/2020). In review of yesterday's note  (6/30/2020), there are no changes except as documented above.     Discussed patient condition and risk of morbidity and/or mortality with RN, RT, Pharmacy, Charge nurse / hot rounds, Patient and sitter.  Critical care will sign off.  Please call with questions

## 2020-07-01 NOTE — ASSESSMENT & PLAN NOTE
Suspect likely due to gastritis rather than pancreatitis   Cont with IVF< anti emetics, pain control   resolved

## 2020-07-01 NOTE — PROGRESS NOTES
Early catheter removed per Dr Gonda order. Pt educated to call for assistance with bathroom needs.

## 2020-07-01 NOTE — PROGRESS NOTES
[facility] Medicine Daily Progress Note    Date of Service  7/1/2020    Chief Complaint  45 y.o. female admitted 6/26/2020 with ETOH withdrawal and SI.     Hospital Course  with a past medical history significant for bipolar disorder, depression and congestive heart failure who is brought in by EMS from [place] on 6/26 where she had originally presented for alcohol detox.  There she was found to be suicidal and was placed on a legal hold.  She was seen in the emergency department where she was diagnosed with alcoholism, suicidal ideation, and alcohol induced acute pancreatitis.  She was given IV fluids, Reglan, and a GI cocktail and admitted to the hospital. she went into alcohol withdrawal syndrome becoming too difficult to manage with standard CIWA protocol/Ativan and was transferred to ICU placed on Precedex ggt and now transferred back to Floor.     Interval Problem Update  Today patient weaned off of precedex GGT  Afebrile, VS improved   Good UOP  contniued on NS  Continues to complain of abd pain      Consultants/Specialty  Psych    Code Status  Full    Disposition  TBD    Review of Systems  Review of Systems   Constitutional: Positive for malaise/fatigue. Negative for chills and fever.   HENT: Negative for congestion, hearing loss and tinnitus.    Eyes: Negative for blurred vision, double vision and discharge.   Respiratory: Negative for cough, hemoptysis and shortness of breath.    Cardiovascular: Negative for chest pain, palpitations and leg swelling.   Gastrointestinal: Positive for abdominal pain and nausea. Negative for heartburn and vomiting.   Genitourinary: Negative for dysuria and flank pain.   Musculoskeletal: Negative for joint pain and myalgias.   Skin: Negative for rash.   Neurological: Negative for dizziness, sensory change, speech change, focal weakness and weakness.   Endo/Heme/Allergies: Negative for environmental allergies. Does not bruise/bleed easily.   Psychiatric/Behavioral: Negative  for depression, hallucinations and substance abuse.        Physical Exam  Temp:  [36.7 °C (98.1 °F)-37.3 °C (99.2 °F)] 37.3 °C (99.2 °F)  Pulse:  [] 98  Resp:  [0-43] 17  BP: ()/(39-97) 90/61  SpO2:  [89 %-100 %] 95 %    Physical Exam  Vitals signs reviewed.   Constitutional:       Appearance: Normal appearance. She is ill-appearing.   HENT:      Head: Normocephalic and atraumatic.      Nose: No congestion.      Mouth/Throat:      Mouth: Mucous membranes are dry.   Eyes:      Extraocular Movements: Extraocular movements intact.      Pupils: Pupils are equal, round, and reactive to light.   Neck:      Musculoskeletal: Normal range of motion and neck supple. No muscular tenderness.   Cardiovascular:      Rate and Rhythm: Normal rate and regular rhythm.      Pulses: Normal pulses.      Heart sounds: Normal heart sounds. No murmur.   Pulmonary:      Effort: Pulmonary effort is normal. No respiratory distress.      Breath sounds: Normal breath sounds. No stridor.   Abdominal:      General: Bowel sounds are normal. There is no distension.      Palpations: Abdomen is soft.      Tenderness: There is abdominal tenderness.      Comments: Epigastric ttp    Musculoskeletal:         General: No swelling or deformity.   Skin:     General: Skin is warm and dry.      Capillary Refill: Capillary refill takes 2 to 3 seconds.   Neurological:      General: No focal deficit present.      Mental Status: She is alert.      Comments: Mildly somnolent but arousable and no neurological deficits noted   Psychiatric:         Mood and Affect: Mood normal.         Behavior: Behavior normal.         Thought Content: Thought content normal.         Judgment: Judgment normal.         Fluids    Intake/Output Summary (Last 24 hours) at 7/1/2020 1336  Last data filed at 7/1/2020 1100  Gross per 24 hour   Intake 3753.2 ml   Output 2525 ml   Net 1228.2 ml       Laboratory  Recent Labs     06/29/20  0248 06/30/20  0515 07/01/20  0542   WBC  4.7* 3.7* 7.7   RBC 4.06* 4.12* 3.83*   HEMOGLOBIN 12.5 12.6 11.9*   HEMATOCRIT 38.2 39.1 35.8*   MCV 94.1 94.9 93.5   MCH 30.8 30.6 31.1   MCHC 32.7* 32.2* 33.2*   RDW 61.9* 61.1* 60.6*   PLATELETCT 47* 51* 78*   MPV 10.7 10.8 10.9     Recent Labs     06/29/20  0248 06/30/20  0515 07/01/20  0542   SODIUM 137 139 138   POTASSIUM 3.8 4.2 3.9   CHLORIDE 101 106 103   CO2 25 23 23   GLUCOSE 98 119* 89   BUN 7* 5* 6*   CREATININE 0.64 0.57 0.69   CALCIUM 8.9 8.8 8.3*                   Imaging  IR-MIDLINE CATHETER INSERTION WO GUIDANCE > AGE 3   Final Result                  Ultrasound-guided midline placement performed by qualified nursing staff    as above.          DX-CHEST-PORTABLE (1 VIEW)   Final Result      No acute cardiopulmonary abnormality.      CT-ABDOMEN-PELVIS WITH   Final Result         1. Hepatic steatosis.      2. There is a small sliding hiatal hernia otherwise the remainder of the CT scan of the abdomen and pelvis is unremarkable.           Assessment/Plan  * Alcohol withdrawal (HCC)- (present on admission)  Assessment & Plan  Patient admitted initally to telemetry on [facility] and required ICU transfer now will be transferred back to Telemetry   Cont with [facility] Protocol with prn ativan  enocurage alcohol cessation    Suicide ideation- (present on admission)  Assessment & Plan  Patient did have a suicide attempt on 7/2019 propanolol overdose  Patient does have intentions of suicide with propanolol  Legal hold   Psychiatry consulted and legal hold placed     Gastroesophageal reflux disease with esophagitis  Assessment & Plan  Start omeprazole     Essential hypertension- (present on admission)  Assessment & Plan  controlled  Continue current home propanolol  IV as needed medications have been ordered    Manic depressive illness (HCC)- (present on admission)  Assessment & Plan  Continue olanzapine, sertraline, divalproex, gabapentin, aripiprazole  Appreciate psychiatric input    Elevated liver function tests-  (present on admission)  Assessment & Plan  Due to alcohol abuse    Abdominal pain  Assessment & Plan  Suspect likely due to gastritis rather than pancreatitis   Cont with IVF< anti emetics, pain control       Tobacco abuse- (present on admission)  Assessment & Plan  Cessation counseling.     Thrombocytopenia (HCC)- (present on admission)  Assessment & Plan  Continue to trend    Hypothyroidism- (present on admission)  Assessment & Plan  Continue home thyroid medication       VTE prophylaxis:

## 2020-07-01 NOTE — PROGRESS NOTES
"Pt c/o 10/10 abdominal pain. PRNs and scheduled meds were given by NOC RN. Unable to treat pain per MAR until 1036. Pt is upset, removes BP cuff, pulseox because \"it's bothering\" her but frustrated that this RN has to fix the lines/cords.    "

## 2020-07-01 NOTE — PROGRESS NOTES
Pt states she is worried her  does not know that she was transferred to [facility] from [place]. Pt gave RN the phone number to her husbands boss so that he can update . Per pt her  does not have home or cell phone. This RN explained to pt that we cannot involve other people since she is currently a legal hold. We will wait until tomorrow to reassess with psych. Pt understands and agrees with plan.

## 2020-07-01 NOTE — CARE PLAN
Problem: Pain Management  Goal: Pain level will decrease to patient's comfort goal  Outcome: PROGRESSING SLOWER THAN EXPECTED  Note: Pain is assessed throughout the shift and per unit protocol. Pharmacological and non-pharmacological measures to treat pain are offered to patient. Pt is medicated per MAR.         Problem: Psychosocial Needs:  Goal: Level of anxiety will decrease  Outcome: PROGRESSING SLOWER THAN EXPECTED  Note: Anxiety triggers identified. Calming techniques provided to patient. Patient is involved in POC and is encouraged to verbalize questions and concerns.

## 2020-07-02 LAB
ANION GAP SERPL CALC-SCNC: 10 MMOL/L (ref 7–16)
ANISOCYTOSIS BLD QL SMEAR: ABNORMAL
BASO STIPL BLD QL SMEAR: NORMAL
BASOPHILS # BLD AUTO: 0.3 % (ref 0–1.8)
BASOPHILS # BLD: 0.02 K/UL (ref 0–0.12)
BUN SERPL-MCNC: 8 MG/DL (ref 8–22)
CALCIUM SERPL-MCNC: 7.9 MG/DL (ref 8.5–10.5)
CHLORIDE SERPL-SCNC: 102 MMOL/L (ref 96–112)
CO2 SERPL-SCNC: 23 MMOL/L (ref 20–33)
COMMENT 1642: NORMAL
CREAT SERPL-MCNC: 0.71 MG/DL (ref 0.5–1.4)
DACRYOCYTES BLD QL SMEAR: NORMAL
EOSINOPHIL # BLD AUTO: 0.51 K/UL (ref 0–0.51)
EOSINOPHIL NFR BLD: 7.6 % (ref 0–6.9)
ERYTHROCYTE [DISTWIDTH] IN BLOOD BY AUTOMATED COUNT: 65.2 FL (ref 35.9–50)
GLUCOSE SERPL-MCNC: 121 MG/DL (ref 65–99)
HCT VFR BLD AUTO: 36.1 % (ref 37–47)
HGB BLD-MCNC: 11.5 G/DL (ref 12–16)
IMM GRANULOCYTES # BLD AUTO: 0.05 K/UL (ref 0–0.11)
IMM GRANULOCYTES NFR BLD AUTO: 0.7 % (ref 0–0.9)
LYMPHOCYTES # BLD AUTO: 0.71 K/UL (ref 1–4.8)
LYMPHOCYTES NFR BLD: 10.5 % (ref 22–41)
MACROCYTES BLD QL SMEAR: ABNORMAL
MCH RBC QN AUTO: 30.8 PG (ref 27–33)
MCHC RBC AUTO-ENTMCNC: 31.9 G/DL (ref 33.6–35)
MCV RBC AUTO: 96.8 FL (ref 81.4–97.8)
MICROCYTES BLD QL SMEAR: ABNORMAL
MONOCYTES # BLD AUTO: 0.58 K/UL (ref 0–0.85)
MONOCYTES NFR BLD AUTO: 8.6 % (ref 0–13.4)
MORPHOLOGY BLD-IMP: NORMAL
NEUTROPHILS # BLD AUTO: 4.88 K/UL (ref 2–7.15)
NEUTROPHILS NFR BLD: 72.3 % (ref 44–72)
NRBC # BLD AUTO: 0 K/UL
NRBC BLD-RTO: 0 /100 WBC
OVALOCYTES BLD QL SMEAR: NORMAL
PLATELET # BLD AUTO: 99 K/UL (ref 164–446)
PLATELET BLD QL SMEAR: NORMAL
PMV BLD AUTO: 11.1 FL (ref 9–12.9)
POIKILOCYTOSIS BLD QL SMEAR: NORMAL
POLYCHROMASIA BLD QL SMEAR: NORMAL
POTASSIUM SERPL-SCNC: 3.6 MMOL/L (ref 3.6–5.5)
RBC # BLD AUTO: 3.73 M/UL (ref 4.2–5.4)
RBC BLD AUTO: PRESENT
SODIUM SERPL-SCNC: 135 MMOL/L (ref 135–145)
WBC # BLD AUTO: 6.8 K/UL (ref 4.8–10.8)

## 2020-07-02 PROCEDURE — 700102 HCHG RX REV CODE 250 W/ 637 OVERRIDE(OP): Performed by: HOSPITALIST

## 2020-07-02 PROCEDURE — 700102 HCHG RX REV CODE 250 W/ 637 OVERRIDE(OP): Performed by: PSYCHIATRY & NEUROLOGY

## 2020-07-02 PROCEDURE — A9270 NON-COVERED ITEM OR SERVICE: HCPCS | Performed by: HOSPITALIST

## 2020-07-02 PROCEDURE — 770001 HCHG ROOM/CARE - MED/SURG/GYN PRIV*

## 2020-07-02 PROCEDURE — 99232 SBSQ HOSP IP/OBS MODERATE 35: CPT | Performed by: INTERNAL MEDICINE

## 2020-07-02 PROCEDURE — A9270 NON-COVERED ITEM OR SERVICE: HCPCS | Performed by: INTERNAL MEDICINE

## 2020-07-02 PROCEDURE — 700102 HCHG RX REV CODE 250 W/ 637 OVERRIDE(OP): Performed by: INTERNAL MEDICINE

## 2020-07-02 PROCEDURE — 85025 COMPLETE CBC W/AUTO DIFF WBC: CPT

## 2020-07-02 PROCEDURE — A9270 NON-COVERED ITEM OR SERVICE: HCPCS | Performed by: PSYCHIATRY & NEUROLOGY

## 2020-07-02 PROCEDURE — 80048 BASIC METABOLIC PNL TOTAL CA: CPT

## 2020-07-02 RX ADMIN — Medication 100 MG: at 06:09

## 2020-07-02 RX ADMIN — THERA TABS 1 TABLET: TAB at 06:09

## 2020-07-02 RX ADMIN — LEVOTHYROXINE SODIUM 75 MCG: 0.07 TABLET ORAL at 06:09

## 2020-07-02 RX ADMIN — FOLIC ACID 1 MG: 1 TABLET ORAL at 06:09

## 2020-07-02 RX ADMIN — OMEPRAZOLE 20 MG: 20 CAPSULE, DELAYED RELEASE ORAL at 06:09

## 2020-07-02 RX ADMIN — LORAZEPAM 0.5 MG: 1 TABLET ORAL at 21:59

## 2020-07-02 RX ADMIN — GABAPENTIN 300 MG: 300 CAPSULE ORAL at 21:59

## 2020-07-02 RX ADMIN — LORAZEPAM 0.5 MG: 1 TABLET ORAL at 00:43

## 2020-07-02 RX ADMIN — OXYCODONE 5 MG: 5 TABLET ORAL at 12:49

## 2020-07-02 RX ADMIN — LORAZEPAM 0.5 MG: 1 TABLET ORAL at 22:07

## 2020-07-02 RX ADMIN — GABAPENTIN 300 MG: 300 CAPSULE ORAL at 13:07

## 2020-07-02 RX ADMIN — GABAPENTIN 300 MG: 300 CAPSULE ORAL at 06:09

## 2020-07-02 RX ADMIN — OXYCODONE 5 MG: 5 TABLET ORAL at 06:09

## 2020-07-02 RX ADMIN — OXYCODONE 5 MG: 5 TABLET ORAL at 00:43

## 2020-07-02 RX ADMIN — NICOTINE POLACRILEX 2 MG: 2 GUM, CHEWING BUCCAL at 22:13

## 2020-07-02 RX ADMIN — OXYCODONE 5 MG: 5 TABLET ORAL at 21:59

## 2020-07-02 RX ADMIN — ARIPIPRAZOLE 10 MG: 10 TABLET ORAL at 06:09

## 2020-07-02 RX ADMIN — LORAZEPAM 0.5 MG: 1 TABLET ORAL at 17:57

## 2020-07-02 RX ADMIN — SERTRALINE 50 MG: 100 TABLET, FILM COATED ORAL at 06:11

## 2020-07-02 RX ADMIN — OLANZAPINE 10 MG: 5 TABLET, FILM COATED ORAL at 21:59

## 2020-07-02 RX ADMIN — DIVALPROEX SODIUM 500 MG: 500 TABLET, FILM COATED, EXTENDED RELEASE ORAL at 22:00

## 2020-07-02 RX ADMIN — OXYCODONE 5 MG: 5 TABLET ORAL at 17:57

## 2020-07-02 RX ADMIN — OMEPRAZOLE 20 MG: 20 CAPSULE, DELAYED RELEASE ORAL at 17:57

## 2020-07-02 ASSESSMENT — LIFESTYLE VARIABLES
ORIENTATION AND CLOUDING OF SENSORIUM: ORIENTED AND CAN DO SERIAL ADDITIONS
NAUSEA AND VOMITING: NO NAUSEA AND NO VOMITING
ORIENTATION AND CLOUDING OF SENSORIUM: ORIENTED AND CAN DO SERIAL ADDITIONS
AGITATION: NORMAL ACTIVITY
AUDITORY DISTURBANCES: NOT PRESENT
HEADACHE, FULLNESS IN HEAD: NOT PRESENT
NAUSEA AND VOMITING: MILD NAUSEA WITH NO VOMITING
TOTAL SCORE: 0
TOTAL SCORE: 7
ANXIETY: *
HEADACHE, FULLNESS IN HEAD: MILD
NAUSEA AND VOMITING: NO NAUSEA AND NO VOMITING
HEADACHE, FULLNESS IN HEAD: MODERATE
AGITATION: NORMAL ACTIVITY
AGITATION: SOMEWHAT MORE THAN NORMAL ACTIVITY
TOTAL SCORE: 4
ANXIETY: MILDLY ANXIOUS
ANXIETY: *
VISUAL DISTURBANCES: NOT PRESENT
VISUAL DISTURBANCES: NOT PRESENT
TREMOR: NO TREMOR
PAROXYSMAL SWEATS: NO SWEAT VISIBLE
AUDITORY DISTURBANCES: NOT PRESENT
PAROXYSMAL SWEATS: NO SWEAT VISIBLE
TOTAL SCORE: 7
HEADACHE, FULLNESS IN HEAD: NOT PRESENT
NAUSEA AND VOMITING: NO NAUSEA AND NO VOMITING
AUDITORY DISTURBANCES: NOT PRESENT
NAUSEA AND VOMITING: NO NAUSEA AND NO VOMITING
ANXIETY: *
ORIENTATION AND CLOUDING OF SENSORIUM: ORIENTED AND CAN DO SERIAL ADDITIONS
AUDITORY DISTURBANCES: NOT PRESENT
TOTAL SCORE: 2
VISUAL DISTURBANCES: NOT PRESENT
VISUAL DISTURBANCES: NOT PRESENT
NAUSEA AND VOMITING: NO NAUSEA AND NO VOMITING
PAROXYSMAL SWEATS: NO SWEAT VISIBLE
ANXIETY: MILDLY ANXIOUS
HEADACHE, FULLNESS IN HEAD: VERY MILD
VISUAL DISTURBANCES: NOT PRESENT
TREMOR: NO TREMOR
AGITATION: SOMEWHAT MORE THAN NORMAL ACTIVITY
ANXIETY: NO ANXIETY (AT EASE)
ORIENTATION AND CLOUDING OF SENSORIUM: ORIENTED AND CAN DO SERIAL ADDITIONS
AGITATION: NORMAL ACTIVITY
PAROXYSMAL SWEATS: BARELY PERCEPTIBLE SWEATING. PALMS MOIST
AGITATION: SOMEWHAT MORE THAN NORMAL ACTIVITY
HEADACHE, FULLNESS IN HEAD: MODERATE
AUDITORY DISTURBANCES: NOT PRESENT
TREMOR: NO TREMOR
PAROXYSMAL SWEATS: NO SWEAT VISIBLE
TOTAL SCORE: 8
AUDITORY DISTURBANCES: NOT PRESENT
NAUSEA AND VOMITING: NO NAUSEA AND NO VOMITING
ORIENTATION AND CLOUDING OF SENSORIUM: ORIENTED AND CAN DO SERIAL ADDITIONS
PAROXYSMAL SWEATS: NO SWEAT VISIBLE
AUDITORY DISTURBANCES: NOT PRESENT
PAROXYSMAL SWEATS: NO SWEAT VISIBLE
HEADACHE, FULLNESS IN HEAD: MODERATE
ORIENTATION AND CLOUDING OF SENSORIUM: ORIENTED AND CAN DO SERIAL ADDITIONS
VISUAL DISTURBANCES: NOT PRESENT
TREMOR: NO TREMOR
AGITATION: SOMEWHAT MORE THAN NORMAL ACTIVITY
VISUAL DISTURBANCES: NOT PRESENT
ANXIETY: *
TOTAL SCORE: 4
ORIENTATION AND CLOUDING OF SENSORIUM: ORIENTED AND CAN DO SERIAL ADDITIONS

## 2020-07-02 ASSESSMENT — ENCOUNTER SYMPTOMS
COUGH: 0
PALPITATIONS: 0
DIZZINESS: 0
FLANK PAIN: 0
HEMOPTYSIS: 0
MYALGIAS: 0
DOUBLE VISION: 0
SPEECH CHANGE: 0
BRUISES/BLEEDS EASILY: 0
CHILLS: 0
ABDOMINAL PAIN: 1
NAUSEA: 0
SHORTNESS OF BREATH: 0
SENSORY CHANGE: 0
VOMITING: 0
WEAKNESS: 0
EYE DISCHARGE: 0
FEVER: 0
FOCAL WEAKNESS: 0

## 2020-07-02 NOTE — CARE PLAN
Problem: Communication  Goal: The ability to communicate needs accurately and effectively will improve  Outcome: PROGRESSING AS EXPECTED  Note: Pt able to communicate needs accurately and appropriately      Problem: Safety  Goal: Will remain free from falls  Outcome: PROGRESSING AS EXPECTED  Note: Pt uses call light appropriately. Free from falls. Non slip socks in place. Waits for staff for assistance.

## 2020-07-02 NOTE — PROGRESS NOTES
[facility] Medicine Daily Progress Note    Date of Service  7/2/2020    Chief Complaint  45 y.o. female admitted 6/26/2020 with ETOH withdrawal and SI.     Hospital Course  with a past medical history significant for bipolar disorder, depression and congestive heart failure who is brought in by EMS from [place] on 6/26 where she had originally presented for alcohol detox.  There she was found to be suicidal and was placed on a legal hold.  She was seen in the emergency department where she was diagnosed with alcoholism, suicidal ideation, and alcohol induced acute pancreatitis.  She was given IV fluids, Reglan, and a GI cocktail and admitted to the hospital. she went into alcohol withdrawal syndrome becoming too difficult to manage with standard CIWA protocol/Ativan and was transferred to ICU placed on Precedex ggt and now transferred back to Floor.     Interval Problem Update  The patient deny homicidal and suicidal ideation.  Await psychiatry evaluation.      Consultants/Specialty  Psych    Code Status  Full    Disposition  TBD    Review of Systems  Review of Systems   Constitutional: Positive for malaise/fatigue. Negative for chills and fever.   HENT: Negative for congestion, hearing loss and tinnitus.    Eyes: Negative for double vision and discharge.   Respiratory: Negative for cough, hemoptysis and shortness of breath.    Cardiovascular: Negative for chest pain and palpitations.   Gastrointestinal: Positive for abdominal pain. Negative for nausea and vomiting.   Genitourinary: Negative for dysuria and flank pain.   Musculoskeletal: Negative for joint pain and myalgias.   Skin: Negative for rash.   Neurological: Negative for dizziness, sensory change, speech change, focal weakness and weakness.   Endo/Heme/Allergies: Negative for environmental allergies. Does not bruise/bleed easily.        Physical Exam  Temp:  [36.4 °C (97.5 °F)-36.8 °C (98.3 °F)] 36.8 °C (98.3 °F)  Pulse:  [] 95  Resp:  [0-24] 20  BP:  ()/(39-69) 96/59  SpO2:  [84 %-95 %] 90 %    Physical Exam  Vitals signs reviewed.   Constitutional:       Appearance: Normal appearance. She is ill-appearing.   HENT:      Head: Normocephalic and atraumatic.      Nose: No congestion.      Mouth/Throat:      Mouth: Mucous membranes are dry.   Eyes:      Extraocular Movements: Extraocular movements intact.      Pupils: Pupils are equal, round, and reactive to light.   Neck:      Musculoskeletal: Normal range of motion and neck supple. No muscular tenderness.   Cardiovascular:      Rate and Rhythm: Normal rate and regular rhythm.      Pulses: Normal pulses.      Heart sounds: Normal heart sounds.   Pulmonary:      Effort: Pulmonary effort is normal. No respiratory distress.      Breath sounds: Normal breath sounds.   Abdominal:      General: Bowel sounds are normal. There is no distension.      Palpations: Abdomen is soft.      Tenderness: There is abdominal tenderness.      Comments: Epigastric ttp    Musculoskeletal:         General: No swelling or deformity.   Skin:     General: Skin is warm and dry.      Capillary Refill: Capillary refill takes 2 to 3 seconds.   Neurological:      General: No focal deficit present.      Mental Status: She is alert.      Comments: Mildly somnolent but arousable and no neurological deficits noted         Fluids    Intake/Output Summary (Last 24 hours) at 7/2/2020 0852  Last data filed at 7/1/2020 2000  Gross per 24 hour   Intake 1050 ml   Output 340 ml   Net 710 ml       Laboratory  Recent Labs     06/30/20 0515 07/01/20  0542 07/02/20  0625   WBC 3.7* 7.7 6.8   RBC 4.12* 3.83* 3.73*   HEMOGLOBIN 12.6 11.9* 11.5*   HEMATOCRIT 39.1 35.8* 36.1*   MCV 94.9 93.5 96.8   MCH 30.6 31.1 30.8   MCHC 32.2* 33.2* 31.9*   RDW 61.1* 60.6* 65.2*   PLATELETCT 51* 78* 99*   MPV 10.8 10.9 11.1     Recent Labs     06/30/20  0515 07/01/20  0542 07/02/20  0625   SODIUM 139 138 135   POTASSIUM 4.2 3.9 3.6   CHLORIDE 106 103 102   CO2 23 23 23    GLUCOSE 119* 89 121*   BUN 5* 6* 8   CREATININE 0.57 0.69 0.71   CALCIUM 8.8 8.3* 7.9*                   Imaging  IR-MIDLINE CATHETER INSERTION WO GUIDANCE > AGE 3   Final Result                  Ultrasound-guided midline placement performed by qualified nursing staff    as above.          DX-CHEST-PORTABLE (1 VIEW)   Final Result      No acute cardiopulmonary abnormality.      CT-ABDOMEN-PELVIS WITH   Final Result         1. Hepatic steatosis.      2. There is a small sliding hiatal hernia otherwise the remainder of the CT scan of the abdomen and pelvis is unremarkable.           Assessment/Plan  * Alcohol withdrawal (HCC)- (present on admission)  Assessment & Plan  Patient admitted initally to telemetry on CIWA and required ICU transfer now will be transferred back to Telemetry   Cont with [facility] Protocol with prn ativan  Improving  enocurage alcohol cessation    Suicide ideation- (present on admission)  Assessment & Plan  Patient did have a suicide attempt on 7/2019 propanolol overdose  Patient does have intentions of suicide with propanolol  Legal hold   Psychiatry following    Gastroesophageal reflux disease with esophagitis  Assessment & Plan  Start omeprazole     Essential hypertension- (present on admission)  Assessment & Plan  controlled  Continue current home propanolol  IV as needed medications have been ordered    Manic depressive illness (HCC)- (present on admission)  Assessment & Plan  Continue olanzapine, sertraline, divalproex, gabapentin, aripiprazole  Appreciate psychiatric input    Elevated liver function tests- (present on admission)  Assessment & Plan  Due to alcohol abuse    Abdominal pain  Assessment & Plan  Suspect likely due to gastritis rather than pancreatitis   Cont with IVF< anti emetics, pain control   Improving      Tobacco abuse- (present on admission)  Assessment & Plan  Cessation counseling.     Thrombocytopenia (HCC)- (present on admission)  Assessment & Plan  Continue to  trend    Hypothyroidism- (present on admission)  Assessment & Plan  Continue home thyroid medication       VTE prophylaxis:

## 2020-07-02 NOTE — CARE PLAN
Problem: Safety  Goal: Will remain free from injury  Note:   Pt is on Legal Hold 2000.    All personal items removed from room and kept in a secure area.    Hospital clothing only. Removed all sharps and potential dangerous items, telephone cords.  “Stop - Check with Nurse before entering” sign to be placed outside patient’s room    Finger foods and paper ware only on meal tray.  1:1 sitter.      Problem: Psychosocial Needs:  Goal: Level of anxiety will decrease  Note: Anxiety triggers identified. Calming techniques provided to patient. Patient is involved in POC and is encouraged to verbalize questions and concerns.

## 2020-07-02 NOTE — DISCHARGE PLANNING
Per Rounds discussion today, Pt is not yet medically cleared, Pt is on Legal Hold and 1:1 sitter. Plan is to discharge Pt to an In Pt Psych Facility.

## 2020-07-02 NOTE — PROGRESS NOTES
Received an order to transfer patient to the medical status. Discontinued telemetry. Monitor room notified.

## 2020-07-03 LAB
ANION GAP SERPL CALC-SCNC: 10 MMOL/L (ref 7–16)
BASOPHILS # BLD AUTO: 0.4 % (ref 0–1.8)
BASOPHILS # BLD: 0.02 K/UL (ref 0–0.12)
BUN SERPL-MCNC: 9 MG/DL (ref 8–22)
CALCIUM SERPL-MCNC: 8.2 MG/DL (ref 8.5–10.5)
CHLORIDE SERPL-SCNC: 106 MMOL/L (ref 96–112)
CO2 SERPL-SCNC: 24 MMOL/L (ref 20–33)
CREAT SERPL-MCNC: 0.69 MG/DL (ref 0.5–1.4)
EOSINOPHIL # BLD AUTO: 0.51 K/UL (ref 0–0.51)
EOSINOPHIL NFR BLD: 10.3 % (ref 0–6.9)
ERYTHROCYTE [DISTWIDTH] IN BLOOD BY AUTOMATED COUNT: 64.9 FL (ref 35.9–50)
GLUCOSE SERPL-MCNC: 118 MG/DL (ref 65–99)
HCT VFR BLD AUTO: 35.4 % (ref 37–47)
HGB BLD-MCNC: 11.3 G/DL (ref 12–16)
IMM GRANULOCYTES # BLD AUTO: 0.03 K/UL (ref 0–0.11)
IMM GRANULOCYTES NFR BLD AUTO: 0.6 % (ref 0–0.9)
LYMPHOCYTES # BLD AUTO: 1.12 K/UL (ref 1–4.8)
LYMPHOCYTES NFR BLD: 22.5 % (ref 22–41)
MCH RBC QN AUTO: 30.8 PG (ref 27–33)
MCHC RBC AUTO-ENTMCNC: 31.9 G/DL (ref 33.6–35)
MCV RBC AUTO: 96.5 FL (ref 81.4–97.8)
MONOCYTES # BLD AUTO: 0.54 K/UL (ref 0–0.85)
MONOCYTES NFR BLD AUTO: 10.9 % (ref 0–13.4)
NEUTROPHILS # BLD AUTO: 2.75 K/UL (ref 2–7.15)
NEUTROPHILS NFR BLD: 55.3 % (ref 44–72)
NRBC # BLD AUTO: 0 K/UL
NRBC BLD-RTO: 0 /100 WBC
PLATELET # BLD AUTO: 132 K/UL (ref 164–446)
PMV BLD AUTO: 10.1 FL (ref 9–12.9)
POTASSIUM SERPL-SCNC: 3.9 MMOL/L (ref 3.6–5.5)
RBC # BLD AUTO: 3.67 M/UL (ref 4.2–5.4)
SODIUM SERPL-SCNC: 140 MMOL/L (ref 135–145)
WBC # BLD AUTO: 5 K/UL (ref 4.8–10.8)

## 2020-07-03 PROCEDURE — 700102 HCHG RX REV CODE 250 W/ 637 OVERRIDE(OP): Performed by: PSYCHIATRY & NEUROLOGY

## 2020-07-03 PROCEDURE — 700102 HCHG RX REV CODE 250 W/ 637 OVERRIDE(OP): Performed by: INTERNAL MEDICINE

## 2020-07-03 PROCEDURE — 700102 HCHG RX REV CODE 250 W/ 637 OVERRIDE(OP): Performed by: HOSPITALIST

## 2020-07-03 PROCEDURE — A9270 NON-COVERED ITEM OR SERVICE: HCPCS | Performed by: INTERNAL MEDICINE

## 2020-07-03 PROCEDURE — 99232 SBSQ HOSP IP/OBS MODERATE 35: CPT | Performed by: INTERNAL MEDICINE

## 2020-07-03 PROCEDURE — A9270 NON-COVERED ITEM OR SERVICE: HCPCS | Performed by: HOSPITALIST

## 2020-07-03 PROCEDURE — 770001 HCHG ROOM/CARE - MED/SURG/GYN PRIV*

## 2020-07-03 PROCEDURE — 80048 BASIC METABOLIC PNL TOTAL CA: CPT

## 2020-07-03 PROCEDURE — 700111 HCHG RX REV CODE 636 W/ 250 OVERRIDE (IP): Performed by: HOSPITALIST

## 2020-07-03 PROCEDURE — A9270 NON-COVERED ITEM OR SERVICE: HCPCS | Performed by: PSYCHIATRY & NEUROLOGY

## 2020-07-03 PROCEDURE — 85025 COMPLETE CBC W/AUTO DIFF WBC: CPT

## 2020-07-03 RX ADMIN — DIVALPROEX SODIUM 500 MG: 500 TABLET, FILM COATED, EXTENDED RELEASE ORAL at 20:59

## 2020-07-03 RX ADMIN — FOLIC ACID 1 MG: 1 TABLET ORAL at 05:01

## 2020-07-03 RX ADMIN — THERA TABS 1 TABLET: TAB at 05:00

## 2020-07-03 RX ADMIN — OXYCODONE 5 MG: 5 TABLET ORAL at 13:06

## 2020-07-03 RX ADMIN — LORAZEPAM 1 MG: 1 TABLET ORAL at 10:00

## 2020-07-03 RX ADMIN — NICOTINE POLACRILEX 2 MG: 2 GUM, CHEWING BUCCAL at 16:31

## 2020-07-03 RX ADMIN — LORAZEPAM 1 MG: 1 TABLET ORAL at 05:00

## 2020-07-03 RX ADMIN — ARIPIPRAZOLE 10 MG: 10 TABLET ORAL at 05:00

## 2020-07-03 RX ADMIN — OMEPRAZOLE 20 MG: 20 CAPSULE, DELAYED RELEASE ORAL at 05:01

## 2020-07-03 RX ADMIN — OLANZAPINE 10 MG: 5 TABLET, FILM COATED ORAL at 20:59

## 2020-07-03 RX ADMIN — Medication 100 MG: at 05:00

## 2020-07-03 RX ADMIN — GABAPENTIN 300 MG: 300 CAPSULE ORAL at 05:00

## 2020-07-03 RX ADMIN — GABAPENTIN 300 MG: 300 CAPSULE ORAL at 21:00

## 2020-07-03 RX ADMIN — LORAZEPAM 1 MG: 2 INJECTION INTRAMUSCULAR; INTRAVENOUS at 14:53

## 2020-07-03 RX ADMIN — OMEPRAZOLE 20 MG: 20 CAPSULE, DELAYED RELEASE ORAL at 17:56

## 2020-07-03 RX ADMIN — LORAZEPAM 0.5 MG: 2 INJECTION INTRAMUSCULAR; INTRAVENOUS at 21:10

## 2020-07-03 RX ADMIN — OXYCODONE 5 MG: 5 TABLET ORAL at 05:01

## 2020-07-03 RX ADMIN — OXYCODONE 5 MG: 5 TABLET ORAL at 22:39

## 2020-07-03 RX ADMIN — OXYCODONE 5 MG: 5 TABLET ORAL at 17:56

## 2020-07-03 RX ADMIN — GABAPENTIN 300 MG: 300 CAPSULE ORAL at 13:06

## 2020-07-03 RX ADMIN — LEVOTHYROXINE SODIUM 75 MCG: 0.07 TABLET ORAL at 05:01

## 2020-07-03 RX ADMIN — OXYCODONE 5 MG: 5 TABLET ORAL at 09:02

## 2020-07-03 RX ADMIN — SERTRALINE 50 MG: 100 TABLET, FILM COATED ORAL at 05:12

## 2020-07-03 ASSESSMENT — LIFESTYLE VARIABLES
TOTAL SCORE: 13
TREMOR: NO TREMOR
AGITATION: SOMEWHAT MORE THAN NORMAL ACTIVITY
TOTAL SCORE: 4
AUDITORY DISTURBANCES: NOT PRESENT
PAROXYSMAL SWEATS: BARELY PERCEPTIBLE SWEATING. PALMS MOIST
PAROXYSMAL SWEATS: NO SWEAT VISIBLE
TREMOR: NO TREMOR
HEADACHE, FULLNESS IN HEAD: MILD
AGITATION: *
AGITATION: SOMEWHAT MORE THAN NORMAL ACTIVITY
TOTAL SCORE: 4
AUDITORY DISTURBANCES: NOT PRESENT
AVERAGE NUMBER OF DAYS PER WEEK YOU HAVE A DRINK CONTAINING ALCOHOL: 7
ANXIETY: *
VISUAL DISTURBANCES: NOT PRESENT
TOTAL SCORE: 6
VISUAL DISTURBANCES: VERY MILD SENSITIVITY
ORIENTATION AND CLOUDING OF SENSORIUM: ORIENTED AND CAN DO SERIAL ADDITIONS
HOW MANY TIMES IN THE PAST YEAR HAVE YOU HAD 5 OR MORE DRINKS IN A DAY: 30
TREMOR: NO TREMOR
ANXIETY: *
ORIENTATION AND CLOUDING OF SENSORIUM: ORIENTED AND CAN DO SERIAL ADDITIONS
NAUSEA AND VOMITING: MILD NAUSEA WITH NO VOMITING
AGITATION: NORMAL ACTIVITY
PAROXYSMAL SWEATS: NO SWEAT VISIBLE
ORIENTATION AND CLOUDING OF SENSORIUM: ORIENTED AND CAN DO SERIAL ADDITIONS
NAUSEA AND VOMITING: NO NAUSEA AND NO VOMITING
TOTAL SCORE: 11
NAUSEA AND VOMITING: MILD NAUSEA WITH NO VOMITING
AUDITORY DISTURBANCES: NOT PRESENT
TREMOR: *
NAUSEA AND VOMITING: NO NAUSEA AND NO VOMITING
HEADACHE, FULLNESS IN HEAD: MILD
NAUSEA AND VOMITING: MILD NAUSEA WITH NO VOMITING
CONSUMPTION TOTAL: POSITIVE
AUDITORY DISTURBANCES: NOT PRESENT
AUDITORY DISTURBANCES: NOT PRESENT
VISUAL DISTURBANCES: VERY MILD SENSITIVITY
PAROXYSMAL SWEATS: NO SWEAT VISIBLE
ANXIETY: *
ORIENTATION AND CLOUDING OF SENSORIUM: ORIENTED AND CAN DO SERIAL ADDITIONS
ORIENTATION AND CLOUDING OF SENSORIUM: ORIENTED AND CAN DO SERIAL ADDITIONS
TOTAL SCORE: 9
HAVE PEOPLE ANNOYED YOU BY CRITICIZING YOUR DRINKING: YES
HEADACHE, FULLNESS IN HEAD: MILD
HEADACHE, FULLNESS IN HEAD: MILD
NAUSEA AND VOMITING: NO NAUSEA AND NO VOMITING
ANXIETY: *
TOTAL SCORE: 4
AGITATION: SOMEWHAT MORE THAN NORMAL ACTIVITY
TOTAL SCORE: 8
ORIENTATION AND CLOUDING OF SENSORIUM: ORIENTED AND CAN DO SERIAL ADDITIONS
TOTAL SCORE: 9
VISUAL DISTURBANCES: NOT PRESENT
HAVE YOU EVER FELT YOU SHOULD CUT DOWN ON YOUR DRINKING: YES
PAROXYSMAL SWEATS: NO SWEAT VISIBLE
AGITATION: *
TREMOR: NO TREMOR
NAUSEA AND VOMITING: MILD NAUSEA WITH NO VOMITING
DOES PATIENT WANT TO TALK TO SOMEONE ABOUT QUITTING: YES
VISUAL DISTURBANCES: NOT PRESENT
VISUAL DISTURBANCES: NOT PRESENT
HEADACHE, FULLNESS IN HEAD: MILD
ANXIETY: *
AGITATION: *
ORIENTATION AND CLOUDING OF SENSORIUM: ORIENTED AND CAN DO SERIAL ADDITIONS
TOTAL SCORE: 4
ANXIETY: *
AUDITORY DISTURBANCES: NOT PRESENT
PAROXYSMAL SWEATS: NO SWEAT VISIBLE
ON A TYPICAL DAY WHEN YOU DRINK ALCOHOL HOW MANY DRINKS DO YOU HAVE: 8
HEADACHE, FULLNESS IN HEAD: NOT PRESENT
HEADACHE, FULLNESS IN HEAD: MILD
ANXIETY: *
TREMOR: TREMOR NOT VISIBLE BUT CAN BE FELT, FINGERTIP TO FINGERTIP
VISUAL DISTURBANCES: NOT PRESENT
AUDITORY DISTURBANCES: NOT PRESENT
DOES PATIENT WANT TO STOP DRINKING: YES
ALCOHOL_USE: YES
TREMOR: *
EVER FELT BAD OR GUILTY ABOUT YOUR DRINKING: YES
EVER HAD A DRINK FIRST THING IN THE MORNING TO STEADY YOUR NERVES TO GET RID OF A HANGOVER: YES
PAROXYSMAL SWEATS: NO SWEAT VISIBLE

## 2020-07-03 ASSESSMENT — ENCOUNTER SYMPTOMS
BRUISES/BLEEDS EASILY: 0
NAUSEA: 0
SHORTNESS OF BREATH: 0
CHILLS: 0
HEMOPTYSIS: 0
COUGH: 0
EYE DISCHARGE: 0
PALPITATIONS: 0
DOUBLE VISION: 0
DIZZINESS: 0
MYALGIAS: 0
FLANK PAIN: 0
ABDOMINAL PAIN: 1
SPEECH CHANGE: 0
VOMITING: 0
SENSORY CHANGE: 0

## 2020-07-03 ASSESSMENT — COGNITIVE AND FUNCTIONAL STATUS - GENERAL
SUGGESTED CMS G CODE MODIFIER DAILY ACTIVITY: CH
DAILY ACTIVITIY SCORE: 24
MOBILITY SCORE: 24
SUGGESTED CMS G CODE MODIFIER MOBILITY: CH

## 2020-07-03 NOTE — PROGRESS NOTES
[facility] Medicine Daily Progress Note    Date of Service  7/3/2020    Chief Complaint  45 y.o. female admitted 6/26/2020 with ETOH withdrawal and SI.     Hospital Course  with a past medical history significant for bipolar disorder, depression and congestive heart failure who is brought in by EMS from [place] on 6/26 where she had originally presented for alcohol detox.  There she was found to be suicidal and was placed on a legal hold.  She was seen in the emergency department where she was diagnosed with alcoholism, suicidal ideation, and alcohol induced acute pancreatitis.  She was given IV fluids, Reglan, and a GI cocktail and admitted to the hospital. she went into alcohol withdrawal syndrome becoming too difficult to manage with standard CIWA protocol/Ativan and was transferred to ICU placed on Precedex ggt and now transferred back to Floor.     Interval Problem Update  The patient deny homicidal and suicidal ideation.  Await psychiatry evaluation today.      Consultants/Specialty  Psych    Code Status  Full    Disposition  TBD    Review of Systems  Review of Systems   Constitutional: Positive for malaise/fatigue. Negative for chills.   HENT: Negative for congestion, hearing loss and tinnitus.    Eyes: Negative for double vision and discharge.   Respiratory: Negative for cough, hemoptysis and shortness of breath.    Cardiovascular: Negative for palpitations.   Gastrointestinal: Positive for abdominal pain. Negative for nausea and vomiting.   Genitourinary: Negative for dysuria and flank pain.   Musculoskeletal: Negative for joint pain and myalgias.   Skin: Negative for rash.   Neurological: Negative for dizziness, sensory change and speech change.   Endo/Heme/Allergies: Negative for environmental allergies. Does not bruise/bleed easily.        Physical Exam  Temp:  [36.7 °C (98 °F)-37.3 °C (99.1 °F)] 36.9 °C (98.5 °F)  Pulse:  [70-89] 89  Resp:  [18] 18  BP: ()/(51-59) 101/51  SpO2:  [91 %-95 %] 91  %    Physical Exam  Vitals signs reviewed.   Constitutional:       Appearance: Normal appearance. She is ill-appearing.   HENT:      Head: Normocephalic and atraumatic.      Nose: No congestion.      Mouth/Throat:      Mouth: Mucous membranes are dry.   Eyes:      Pupils: Pupils are equal, round, and reactive to light.   Neck:      Musculoskeletal: Normal range of motion and neck supple. No muscular tenderness.   Cardiovascular:      Rate and Rhythm: Normal rate and regular rhythm.      Pulses: Normal pulses.      Heart sounds: Normal heart sounds.   Pulmonary:      Effort: Pulmonary effort is normal.      Breath sounds: Normal breath sounds.   Abdominal:      General: Bowel sounds are normal.      Palpations: Abdomen is soft.      Tenderness: There is abdominal tenderness.      Comments: Epigastric ttp    Musculoskeletal:         General: No swelling.   Skin:     General: Skin is warm and dry.      Capillary Refill: Capillary refill takes 2 to 3 seconds.   Neurological:      Mental Status: She is alert.      Comments: Mildly somnolent but arousable and no neurological deficits noted         Fluids    Intake/Output Summary (Last 24 hours) at 7/3/2020 0849  Last data filed at 7/2/2020 1800  Gross per 24 hour   Intake 1440 ml   Output --   Net 1440 ml       Laboratory  Recent Labs     07/01/20  0542 07/02/20  0625 07/03/20  0456   WBC 7.7 6.8 5.0   RBC 3.83* 3.73* 3.67*   HEMOGLOBIN 11.9* 11.5* 11.3*   HEMATOCRIT 35.8* 36.1* 35.4*   MCV 93.5 96.8 96.5   MCH 31.1 30.8 30.8   MCHC 33.2* 31.9* 31.9*   RDW 60.6* 65.2* 64.9*   PLATELETCT 78* 99* 132*   MPV 10.9 11.1 10.1     Recent Labs     07/01/20  0542 07/02/20  0625 07/03/20  0456   SODIUM 138 135 140   POTASSIUM 3.9 3.6 3.9   CHLORIDE 103 102 106   CO2 23 23 24   GLUCOSE 89 121* 118*   BUN 6* 8 9   CREATININE 0.69 0.71 0.69   CALCIUM 8.3* 7.9* 8.2*                   Imaging  IR-MIDLINE CATHETER INSERTION WO GUIDANCE > AGE 3   Final Result                   Ultrasound-guided midline placement performed by qualified nursing staff    as above.          DX-CHEST-PORTABLE (1 VIEW)   Final Result      No acute cardiopulmonary abnormality.      CT-ABDOMEN-PELVIS WITH   Final Result         1. Hepatic steatosis.      2. There is a small sliding hiatal hernia otherwise the remainder of the CT scan of the abdomen and pelvis is unremarkable.           Assessment/Plan  * Alcohol withdrawal (HCC)- (present on admission)  Assessment & Plan  Patient admitted initally to telemetry on CIWA and required ICU transfer now will be transferred back to Telemetry   Cont with CIWA Protocol with prn ativan  Improving  enocurage alcohol cessation    Suicide ideation- (present on admission)  Assessment & Plan  Currently the patient denies suicidal and homicidal ideation  Legal hold   Psychiatry following    Gastroesophageal reflux disease with esophagitis  Assessment & Plan  Start omeprazole     Essential hypertension- (present on admission)  Assessment & Plan  controlled  Continue current home propanolol  IV as needed medications have been ordered    Manic depressive illness (HCC)- (present on admission)  Assessment & Plan  Continue olanzapine, sertraline, divalproex, gabapentin, aripiprazole  Appreciate psychiatric input    Elevated liver function tests- (present on admission)  Assessment & Plan  Due to alcohol abuse    Abdominal pain  Assessment & Plan  Suspect likely due to gastritis rather than pancreatitis   Cont with IVF< anti emetics, pain control   Improving      Tobacco abuse- (present on admission)  Assessment & Plan  Cessation counseling.     Thrombocytopenia (HCC)- (present on admission)  Assessment & Plan  Continue to trend    Hypothyroidism- (present on admission)  Assessment & Plan  Continue home thyroid medication       VTE prophylaxis:

## 2020-07-03 NOTE — CARE PLAN
Problem: Safety  Goal: Will remain free from injury  Outcome: PROGRESSING AS EXPECTED  Goal: Will remain free from falls  Outcome: PROGRESSING AS EXPECTED     Problem: Bowel/Gastric:  Goal: Normal bowel function is maintained or improved  Outcome: PROGRESSING AS EXPECTED  Goal: Will not experience complications related to bowel motility  Outcome: PROGRESSING AS EXPECTED     Problem: Pain Management  Goal: Pain level will decrease to patient's comfort goal  Outcome: PROGRESSING AS EXPECTED     Problem: Fluid Volume:  Goal: Will maintain balanced intake and output  Outcome: PROGRESSING AS EXPECTED     Problem: Psychosocial Needs:  Goal: Level of anxiety will decrease  Outcome: PROGRESSING AS EXPECTED     Problem: Communication  Goal: The ability to communicate needs accurately and effectively will improve  Outcome: MET     Problem: Infection  Goal: Will remain free from infection  Outcome: MET     Problem: Venous Thromboembolism (VTW)/Deep Vein Thrombosis (DVT) Prevention:  Goal: Patient will participate in Venous Thrombosis (VTE)/Deep Vein Thrombosis (DVT)Prevention Measures  Outcome: MET     Problem: Knowledge Deficit  Goal: Knowledge of disease process/condition, treatment plan, diagnostic tests, and medications will improve  Outcome: MET  Goal: Knowledge of the prescribed therapeutic regimen will improve  Outcome: MET     Problem: Discharge Barriers/Planning  Goal: Patient's continuum of care needs will be met  Outcome: MET     Problem: Mobility  Goal: Risk for activity intolerance will decrease  Outcome: MET     Problem: Safety - Medical Restraint  Goal: Remains free of injury from restraints (Restraint for Interference with Medical Device)  Description: INTERVENTIONS:  1. Determine that other, less restrictive measures have been tried or would not be effective before applying the restraint  2. Evaluate the patient's condition at the time of restraint application  3. Inform patient/family regarding the reason  for restraint  4. Q2H: Monitor safety, psychosocial status, comfort, nutrition and hydration  Outcome: MET  Goal: Free from restraint(s) (Restraint for Interference with Medical Device)  Description: INTERVENTIONS:  1. ONCE/SHIFT or MINIMUM Q12H: Assess and document the continuing need for restraints  2. Q24H: Continued use of restraint requires LIP to perform face to face examination and written order  3. Identify and implement measures to help patient regain control  Outcome: MET
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
## 2020-07-03 NOTE — PSYCHIATRY
BRIEF PSYCHIATRIC CONSULT NOTE:  Please stop paging psychiatry. I spoke with staff earlier, we are aware that the pt is medically cleared. We are not able to see this patient today. The treatment team can dc the legal hold if they wish to: it says so on the legal paperwork. Our psychiatric coverage is currently very limited. We will see her when we can but as noted the treatment team has the final say so and do not have to wait for psych. That is entirely their discretion.

## 2020-07-04 LAB
ANION GAP SERPL CALC-SCNC: 6 MMOL/L (ref 7–16)
BASOPHILS # BLD AUTO: 0.6 % (ref 0–1.8)
BASOPHILS # BLD: 0.03 K/UL (ref 0–0.12)
BUN SERPL-MCNC: 9 MG/DL (ref 8–22)
CALCIUM SERPL-MCNC: 8.6 MG/DL (ref 8.5–10.5)
CHLORIDE SERPL-SCNC: 103 MMOL/L (ref 96–112)
CO2 SERPL-SCNC: 26 MMOL/L (ref 20–33)
CREAT SERPL-MCNC: 0.64 MG/DL (ref 0.5–1.4)
EOSINOPHIL # BLD AUTO: 0.64 K/UL (ref 0–0.51)
EOSINOPHIL NFR BLD: 11.9 % (ref 0–6.9)
ERYTHROCYTE [DISTWIDTH] IN BLOOD BY AUTOMATED COUNT: 65.4 FL (ref 35.9–50)
GLUCOSE SERPL-MCNC: 116 MG/DL (ref 65–99)
HCT VFR BLD AUTO: 38 % (ref 37–47)
HGB BLD-MCNC: 11.9 G/DL (ref 12–16)
IMM GRANULOCYTES # BLD AUTO: 0.07 K/UL (ref 0–0.11)
IMM GRANULOCYTES NFR BLD AUTO: 1.3 % (ref 0–0.9)
LYMPHOCYTES # BLD AUTO: 1.34 K/UL (ref 1–4.8)
LYMPHOCYTES NFR BLD: 24.8 % (ref 22–41)
MCH RBC QN AUTO: 30.8 PG (ref 27–33)
MCHC RBC AUTO-ENTMCNC: 31.3 G/DL (ref 33.6–35)
MCV RBC AUTO: 98.4 FL (ref 81.4–97.8)
MONOCYTES # BLD AUTO: 0.53 K/UL (ref 0–0.85)
MONOCYTES NFR BLD AUTO: 9.8 % (ref 0–13.4)
NEUTROPHILS # BLD AUTO: 2.79 K/UL (ref 2–7.15)
NEUTROPHILS NFR BLD: 51.6 % (ref 44–72)
NRBC # BLD AUTO: 0 K/UL
NRBC BLD-RTO: 0 /100 WBC
PLATELET # BLD AUTO: 235 K/UL (ref 164–446)
PMV BLD AUTO: 10 FL (ref 9–12.9)
POTASSIUM SERPL-SCNC: 4.3 MMOL/L (ref 3.6–5.5)
RBC # BLD AUTO: 3.86 M/UL (ref 4.2–5.4)
SODIUM SERPL-SCNC: 135 MMOL/L (ref 135–145)
WBC # BLD AUTO: 5.4 K/UL (ref 4.8–10.8)

## 2020-07-04 PROCEDURE — A9270 NON-COVERED ITEM OR SERVICE: HCPCS | Performed by: HOSPITALIST

## 2020-07-04 PROCEDURE — 85025 COMPLETE CBC W/AUTO DIFF WBC: CPT

## 2020-07-04 PROCEDURE — 99232 SBSQ HOSP IP/OBS MODERATE 35: CPT | Performed by: INTERNAL MEDICINE

## 2020-07-04 PROCEDURE — 700102 HCHG RX REV CODE 250 W/ 637 OVERRIDE(OP): Performed by: HOSPITALIST

## 2020-07-04 PROCEDURE — 700102 HCHG RX REV CODE 250 W/ 637 OVERRIDE(OP): Performed by: PSYCHIATRY & NEUROLOGY

## 2020-07-04 PROCEDURE — A9270 NON-COVERED ITEM OR SERVICE: HCPCS | Performed by: PSYCHIATRY & NEUROLOGY

## 2020-07-04 PROCEDURE — 700102 HCHG RX REV CODE 250 W/ 637 OVERRIDE(OP): Performed by: INTERNAL MEDICINE

## 2020-07-04 PROCEDURE — 770004 HCHG ROOM/CARE - ONCOLOGY PRIVATE *

## 2020-07-04 PROCEDURE — 700111 HCHG RX REV CODE 636 W/ 250 OVERRIDE (IP): Performed by: HOSPITALIST

## 2020-07-04 PROCEDURE — A9270 NON-COVERED ITEM OR SERVICE: HCPCS | Performed by: INTERNAL MEDICINE

## 2020-07-04 PROCEDURE — 80048 BASIC METABOLIC PNL TOTAL CA: CPT

## 2020-07-04 RX ADMIN — OMEPRAZOLE 20 MG: 20 CAPSULE, DELAYED RELEASE ORAL at 17:03

## 2020-07-04 RX ADMIN — OXYCODONE 5 MG: 5 TABLET ORAL at 11:41

## 2020-07-04 RX ADMIN — LORAZEPAM 4 MG: 2 TABLET ORAL at 20:32

## 2020-07-04 RX ADMIN — OXYCODONE 5 MG: 5 TABLET ORAL at 07:34

## 2020-07-04 RX ADMIN — LORAZEPAM 3 MG: 2 TABLET ORAL at 22:13

## 2020-07-04 RX ADMIN — OXYCODONE 5 MG: 5 TABLET ORAL at 22:13

## 2020-07-04 RX ADMIN — LORAZEPAM 1.5 MG: 2 INJECTION INTRAMUSCULAR; INTRAVENOUS at 14:41

## 2020-07-04 RX ADMIN — LORAZEPAM 3 MG: 2 TABLET ORAL at 18:43

## 2020-07-04 RX ADMIN — LORAZEPAM 1.5 MG: 2 INJECTION INTRAMUSCULAR; INTRAVENOUS at 15:46

## 2020-07-04 RX ADMIN — LORAZEPAM 1 MG: 2 INJECTION INTRAMUSCULAR; INTRAVENOUS at 12:38

## 2020-07-04 RX ADMIN — GABAPENTIN 300 MG: 300 CAPSULE ORAL at 14:34

## 2020-07-04 RX ADMIN — OMEPRAZOLE 20 MG: 20 CAPSULE, DELAYED RELEASE ORAL at 05:30

## 2020-07-04 RX ADMIN — LORAZEPAM 1.5 MG: 2 INJECTION INTRAMUSCULAR; INTRAVENOUS at 11:30

## 2020-07-04 RX ADMIN — OXYCODONE 5 MG: 5 TABLET ORAL at 03:12

## 2020-07-04 RX ADMIN — DIVALPROEX SODIUM 500 MG: 500 TABLET, FILM COATED, EXTENDED RELEASE ORAL at 20:33

## 2020-07-04 RX ADMIN — SERTRALINE 50 MG: 100 TABLET, FILM COATED ORAL at 05:30

## 2020-07-04 RX ADMIN — ARIPIPRAZOLE 10 MG: 10 TABLET ORAL at 05:30

## 2020-07-04 RX ADMIN — LORAZEPAM 0.5 MG: 1 TABLET ORAL at 05:37

## 2020-07-04 RX ADMIN — GABAPENTIN 300 MG: 300 CAPSULE ORAL at 20:33

## 2020-07-04 RX ADMIN — GABAPENTIN 300 MG: 300 CAPSULE ORAL at 05:30

## 2020-07-04 RX ADMIN — NICOTINE POLACRILEX 2 MG: 2 GUM, CHEWING BUCCAL at 11:30

## 2020-07-04 RX ADMIN — OXYCODONE 5 MG: 5 TABLET ORAL at 15:39

## 2020-07-04 RX ADMIN — LORAZEPAM 3 MG: 2 TABLET ORAL at 21:10

## 2020-07-04 RX ADMIN — OLANZAPINE 10 MG: 5 TABLET, FILM COATED ORAL at 20:33

## 2020-07-04 RX ADMIN — LEVOTHYROXINE SODIUM 75 MCG: 0.07 TABLET ORAL at 05:30

## 2020-07-04 RX ADMIN — LORAZEPAM 2 MG: 2 TABLET ORAL at 17:03

## 2020-07-04 ASSESSMENT — LIFESTYLE VARIABLES
ORIENTATION AND CLOUDING OF SENSORIUM: ORIENTED AND CAN DO SERIAL ADDITIONS
AGITATION: SOMEWHAT MORE THAN NORMAL ACTIVITY
ANXIETY: MODERATELY ANXIOUS OR GUARDED, SO ANXIETY IS INFERRED
HEADACHE, FULLNESS IN HEAD: MODERATE
AUDITORY DISTURBANCES: NOT PRESENT
HEADACHE, FULLNESS IN HEAD: SEVERE
PAROXYSMAL SWEATS: NO SWEAT VISIBLE
HEADACHE, FULLNESS IN HEAD: SEVERE
TOTAL SCORE: 10
AUDITORY DISTURBANCES: NOT PRESENT
HEADACHE, FULLNESS IN HEAD: SEVERE
TREMOR: NO TREMOR
ORIENTATION AND CLOUDING OF SENSORIUM: ORIENTED AND CAN DO SERIAL ADDITIONS
HEADACHE, FULLNESS IN HEAD: SEVERE
VISUAL DISTURBANCES: NOT PRESENT
ANXIETY: MILDLY ANXIOUS
TOTAL SCORE: MILD ITCHING, PINS AND NEEDLES SENSATION, BURNING OR NUMBNESS
AUDITORY DISTURBANCES: NOT PRESENT
PAROXYSMAL SWEATS: NO SWEAT VISIBLE
TOTAL SCORE: MILD ITCHING, PINS AND NEEDLES SENSATION, BURNING OR NUMBNESS
AUDITORY DISTURBANCES: VERY MILD HARSHNESS OR ABILITY TO FRIGHTEN
AUDITORY DISTURBANCES: NOT PRESENT
PAROXYSMAL SWEATS: NO SWEAT VISIBLE
VISUAL DISTURBANCES: MODERATE SENSITIVITY
NAUSEA AND VOMITING: MILD NAUSEA WITH NO VOMITING
TOTAL SCORE: VERY MILD ITCHING, PINS AND NEEDLES SENSATION, BURNING OR NUMBNESS
ANXIETY: *
AUDITORY DISTURBANCES: NOT PRESENT
NAUSEA AND VOMITING: MILD NAUSEA WITH NO VOMITING
NAUSEA AND VOMITING: NO NAUSEA AND NO VOMITING
TOTAL SCORE: MILD ITCHING, PINS AND NEEDLES SENSATION, BURNING OR NUMBNESS
HEADACHE, FULLNESS IN HEAD: SEVERE
ANXIETY: *
NAUSEA AND VOMITING: MILD NAUSEA WITH NO VOMITING
NAUSEA AND VOMITING: MILD NAUSEA WITH NO VOMITING
PAROXYSMAL SWEATS: BARELY PERCEPTIBLE SWEATING. PALMS MOIST
PAROXYSMAL SWEATS: NO SWEAT VISIBLE
AUDITORY DISTURBANCES: NOT PRESENT
NAUSEA AND VOMITING: *
TREMOR: TREMOR NOT VISIBLE BUT CAN BE FELT, FINGERTIP TO FINGERTIP
NAUSEA AND VOMITING: MILD NAUSEA WITH NO VOMITING
TREMOR: MODERATE TREMOR WITH ARMS EXTENDED
TREMOR: *
TOTAL SCORE: MILD ITCHING, PINS AND NEEDLES SENSATION, BURNING OR NUMBNESS
TREMOR: *
HEADACHE, FULLNESS IN HEAD: MODERATE
ORIENTATION AND CLOUDING OF SENSORIUM: ORIENTED AND CAN DO SERIAL ADDITIONS
AUDITORY DISTURBANCES: NOT PRESENT
TOTAL SCORE: VERY MILD ITCHING, PINS AND NEEDLES SENSATION, BURNING OR NUMBNESS
VISUAL DISTURBANCES: NOT PRESENT
VISUAL DISTURBANCES: MILD SENSITIVITY
VISUAL DISTURBANCES: MODERATE SENSITIVITY
TREMOR: NO TREMOR
ORIENTATION AND CLOUDING OF SENSORIUM: ORIENTED AND CAN DO SERIAL ADDITIONS
TOTAL SCORE: MILD ITCHING, PINS AND NEEDLES SENSATION, BURNING OR NUMBNESS
VISUAL DISTURBANCES: MODERATELY SEVERE HALLUCINATIONS
ORIENTATION AND CLOUDING OF SENSORIUM: ORIENTED AND CAN DO SERIAL ADDITIONS
TOTAL SCORE: MILD ITCHING, PINS AND NEEDLES SENSATION, BURNING OR NUMBNESS
NAUSEA AND VOMITING: MILD NAUSEA WITH NO VOMITING
TREMOR: TREMOR NOT VISIBLE BUT CAN BE FELT, FINGERTIP TO FINGERTIP
TOTAL SCORE: 18
PAROXYSMAL SWEATS: BARELY PERCEPTIBLE SWEATING. PALMS MOIST
VISUAL DISTURBANCES: MODERATE SENSITIVITY
VISUAL DISTURBANCES: MODERATE SENSITIVITY
ANXIETY: *
PAROXYSMAL SWEATS: BARELY PERCEPTIBLE SWEATING. PALMS MOIST
PAROXYSMAL SWEATS: NO SWEAT VISIBLE
AUDITORY DISTURBANCES: NOT PRESENT
ORIENTATION AND CLOUDING OF SENSORIUM: ORIENTED AND CAN DO SERIAL ADDITIONS
AGITATION: *
NAUSEA AND VOMITING: NO NAUSEA AND NO VOMITING
ANXIETY: *
AGITATION: *
ANXIETY: MILDLY ANXIOUS
AGITATION: SOMEWHAT MORE THAN NORMAL ACTIVITY
TOTAL SCORE: 22
AGITATION: NORMAL ACTIVITY
PAROXYSMAL SWEATS: BARELY PERCEPTIBLE SWEATING. PALMS MOIST
VISUAL DISTURBANCES: MILD SENSITIVITY
TREMOR: NO TREMOR
AGITATION: SOMEWHAT MORE THAN NORMAL ACTIVITY
VISUAL DISTURBANCES: MILD SENSITIVITY
AGITATION: NORMAL ACTIVITY
AGITATION: NORMAL ACTIVITY
VISUAL DISTURBANCES: MILD SENSITIVITY
AGITATION: *
ORIENTATION AND CLOUDING OF SENSORIUM: ORIENTED AND CAN DO SERIAL ADDITIONS
TOTAL SCORE: 17
ANXIETY: MILDLY ANXIOUS
HEADACHE, FULLNESS IN HEAD: SEVERE
PAROXYSMAL SWEATS: NO SWEAT VISIBLE
ANXIETY: *
ANXIETY: *
TOTAL SCORE: 12
ORIENTATION AND CLOUDING OF SENSORIUM: ORIENTED AND CAN DO SERIAL ADDITIONS
AUDITORY DISTURBANCES: NOT PRESENT
TREMOR: *
NAUSEA AND VOMITING: NO NAUSEA AND NO VOMITING
TOTAL SCORE: 15
ANXIETY: *
PAROXYSMAL SWEATS: NO SWEAT VISIBLE
AUDITORY DISTURBANCES: NOT PRESENT
ORIENTATION AND CLOUDING OF SENSORIUM: ORIENTED AND CAN DO SERIAL ADDITIONS
TOTAL SCORE: VERY MILD ITCHING, PINS AND NEEDLES SENSATION, BURNING OR NUMBNESS
TOTAL SCORE: MODERATE ITCHING, PINS AND NEEDLES SENSATION, BURNING OR NUMBNESS
HEADACHE, FULLNESS IN HEAD: MILD
TREMOR: NO TREMOR
TOTAL SCORE: 4
ANXIETY: *
AGITATION: *
TOTAL SCORE: 28
HEADACHE, FULLNESS IN HEAD: MODERATELY SEVERE
HEADACHE, FULLNESS IN HEAD: SEVERE
NAUSEA AND VOMITING: MILD NAUSEA WITH NO VOMITING
AGITATION: *
HEADACHE, FULLNESS IN HEAD: MODERATELY SEVERE
VISUAL DISTURBANCES: MILD SENSITIVITY
ORIENTATION AND CLOUDING OF SENSORIUM: ORIENTED AND CAN DO SERIAL ADDITIONS
TOTAL SCORE: 19
TOTAL SCORE: 14
TOTAL SCORE: 17
TREMOR: *
AUDITORY DISTURBANCES: NOT PRESENT
TREMOR: MODERATE TREMOR WITH ARMS EXTENDED
PAROXYSMAL SWEATS: BARELY PERCEPTIBLE SWEATING. PALMS MOIST
TOTAL SCORE: 7
NAUSEA AND VOMITING: INTERMITTENT NAUSEA WITH DRY HEAVES
ORIENTATION AND CLOUDING OF SENSORIUM: ORIENTED AND CAN DO SERIAL ADDITIONS
ORIENTATION AND CLOUDING OF SENSORIUM: ORIENTED AND CAN DO SERIAL ADDITIONS
AGITATION: NORMAL ACTIVITY

## 2020-07-04 ASSESSMENT — ENCOUNTER SYMPTOMS
PALPITATIONS: 0
EYE DISCHARGE: 0
ABDOMINAL PAIN: 1
SENSORY CHANGE: 0
MYALGIAS: 0
VOMITING: 0
CHILLS: 0
SPEECH CHANGE: 0
BRUISES/BLEEDS EASILY: 0
NAUSEA: 0
HEMOPTYSIS: 0
FLANK PAIN: 0
DOUBLE VISION: 0
COUGH: 0

## 2020-07-04 NOTE — PROGRESS NOTES
Pt arrived to the unit via wheelchair. Tearful and anxious upon admit. States she is not suicidal but that she is so sad about losing one of her two daughters. Emotional support provided. Sitter at the bedside. All equipment removed from the room. PIV to R FA saline locked. RUE midline CDI and saline locked. Pt up self with a steady gait. CIWA in use. Pt resting comfortably at this time. Call light within reach. Hourly rounding in place.

## 2020-07-04 NOTE — CARE PLAN
Problem: Pain Management  Goal: Pain level will decrease to patient's comfort goal  Outcome: PROGRESSING AS EXPECTED  Flowsheets  Taken 7/4/2020 0300 by Rebecca Kat R.N.  Comfort Goal: Comfort with Movement  Taken 7/4/2020 0800 by Marleny Lora R.N.  Pain Rating Scale (NPRS): 1  Note: The patient has some abdominal pain and a headache. Oxy 5 is being administered as ordered PRN. Distraction and rest have been implemented as nonpharmacologic methods for pain relief.      Problem: Safety  Goal: Will remain free from falls  Note: The patient is self ambulatory and has a sitter for a legal hold. The patient is currently in bed with bed locked and in lowest position. The patient is wearing non-skid footwear and has three bed rails raised. All personal belongings are within reach and the patient has been educated on her call light and calls appropriately. Will continue hourly rounding.

## 2020-07-04 NOTE — PROGRESS NOTES
Report given to RN, and patient transferred with medications, chart, and sitter. No further needs at this time.

## 2020-07-04 NOTE — PROGRESS NOTES
[facility] Medicine Daily Progress Note    Date of Service  7/4/2020    Chief Complaint  45 y.o. female admitted 6/26/2020 with ETOH withdrawal and SI.     Hospital Course  with a past medical history significant for bipolar disorder, depression and congestive heart failure who is brought in by EMS from [place] on 6/26 where she had originally presented for alcohol detox.  There she was found to be suicidal and was placed on a legal hold.  She was seen in the emergency department where she was diagnosed with alcoholism, suicidal ideation, and alcohol induced acute pancreatitis.  She was given IV fluids, Reglan, and a GI cocktail and admitted to the hospital. she went into alcohol withdrawal syndrome becoming too difficult to manage with standard CIWA protocol/Ativan and was transferred to ICU placed on Precedex ggt and now transferred back to Floor.     Interval Problem Update  The patient deny homicidal and suicidal ideation.  Per psychiatry note from yesterday they were busy yesterday hopefully they see her today.  I told RN to update psychiatrist about that.    Consultants/Specialty  Psych    Code Status  Full    Disposition  TBD    Review of Systems  Review of Systems   Constitutional: Positive for malaise/fatigue. Negative for chills.   HENT: Negative for congestion and tinnitus.    Eyes: Negative for double vision and discharge.   Respiratory: Negative for cough and hemoptysis.    Cardiovascular: Negative for palpitations.   Gastrointestinal: Positive for abdominal pain. Negative for nausea and vomiting.   Genitourinary: Negative for dysuria and flank pain.   Musculoskeletal: Negative for joint pain and myalgias.   Skin: Negative for rash.   Neurological: Negative for sensory change and speech change.   Endo/Heme/Allergies: Does not bruise/bleed easily.        Physical Exam  Temp:  [36.4 °C (97.5 °F)] 36.4 °C (97.5 °F)  Pulse:  [74-94] 82  Resp:  [18] 18  BP: (110-126)/(51-63) 110/51  SpO2:  [90 %-93 %] 91  %    Physical Exam  Vitals signs reviewed.   Constitutional:       Appearance: Normal appearance. She is ill-appearing.   HENT:      Head: Normocephalic.      Nose: No congestion.      Mouth/Throat:      Mouth: Mucous membranes are dry.   Eyes:      Pupils: Pupils are equal, round, and reactive to light.   Neck:      Musculoskeletal: Normal range of motion and neck supple. No muscular tenderness.   Cardiovascular:      Rate and Rhythm: Normal rate and regular rhythm.      Pulses: Normal pulses.      Heart sounds: Normal heart sounds.   Pulmonary:      Effort: Pulmonary effort is normal.      Breath sounds: Normal breath sounds.   Abdominal:      General: Bowel sounds are normal.      Tenderness: There is abdominal tenderness.      Comments: Epigastric ttp    Musculoskeletal:         General: No swelling.   Skin:     General: Skin is warm and dry.   Neurological:      Mental Status: She is alert.      Comments: Mildly somnolent but arousable and no neurological deficits noted         Fluids    Intake/Output Summary (Last 24 hours) at 7/4/2020 0843  Last data filed at 7/4/2020 0000  Gross per 24 hour   Intake 480 ml   Output --   Net 480 ml       Laboratory  Recent Labs     07/02/20  0625 07/03/20  0456 07/04/20  0320   WBC 6.8 5.0 5.4   RBC 3.73* 3.67* 3.86*   HEMOGLOBIN 11.5* 11.3* 11.9*   HEMATOCRIT 36.1* 35.4* 38.0   MCV 96.8 96.5 98.4*   MCH 30.8 30.8 30.8   MCHC 31.9* 31.9* 31.3*   RDW 65.2* 64.9* 65.4*   PLATELETCT 99* 132* 235   MPV 11.1 10.1 10.0     Recent Labs     07/02/20  0625 07/03/20  0456 07/04/20  0320   SODIUM 135 140 135   POTASSIUM 3.6 3.9 4.3   CHLORIDE 102 106 103   CO2 23 24 26   GLUCOSE 121* 118* 116*   BUN 8 9 9   CREATININE 0.71 0.69 0.64   CALCIUM 7.9* 8.2* 8.6                   Imaging  IR-MIDLINE CATHETER INSERTION WO GUIDANCE > AGE 3   Final Result                  Ultrasound-guided midline placement performed by qualified nursing staff    as above.          DX-CHEST-PORTABLE (1 VIEW)    Final Result      No acute cardiopulmonary abnormality.      CT-ABDOMEN-PELVIS WITH   Final Result         1. Hepatic steatosis.      2. There is a small sliding hiatal hernia otherwise the remainder of the CT scan of the abdomen and pelvis is unremarkable.           Assessment/Plan  * Alcohol withdrawal (HCC)- (present on admission)  Assessment & Plan  Patient admitted initally to telemetry on WA and required ICU transfer now will be transferred back to Telemetry   resolved    Suicide ideation- (present on admission)  Assessment & Plan  Currently the patient denies suicidal and homicidal ideation  Legal hold   Psychiatry to see the patient    Gastroesophageal reflux disease with esophagitis  Assessment & Plan  Start omeprazole     Essential hypertension- (present on admission)  Assessment & Plan  controlled  Continue current home propanolol  IV as needed medications have been ordered    Manic depressive illness (HCC)- (present on admission)  Assessment & Plan  Continue olanzapine, sertraline, divalproex, gabapentin, aripiprazole  Appreciate psychiatric input    Elevated liver function tests- (present on admission)  Assessment & Plan  Due to alcohol abuse    Abdominal pain  Assessment & Plan  Suspect likely due to gastritis rather than pancreatitis   Cont with IVF< anti emetics, pain control   resolved      Tobacco abuse- (present on admission)  Assessment & Plan  Cessation counseling.     Thrombocytopenia (HCC)- (present on admission)  Assessment & Plan  Continue to trend    Hypothyroidism- (present on admission)  Assessment & Plan  Continue home thyroid medication       VTE prophylaxis:

## 2020-07-04 NOTE — CARE PLAN
Problem: Safety  Goal: Will remain free from injury  Outcome: PROGRESSING AS EXPECTED  Goal: Will remain free from falls  Outcome: PROGRESSING AS EXPECTED   Updated about POC. Reinforce call light use. Pt acknowledged understanding    Problem: Pain Management  Goal: Pain level will decrease to patient's comfort goal  Outcome: PROGRESSING AS EXPECTED  Pain controlled with oxy prn     Problem: Psychosocial Needs:  Goal: Level of anxiety will decrease  Outcome: PROGRESSING AS EXPECTED  Ativan as needed. Very anxious and tearful

## 2020-07-04 NOTE — PROGRESS NOTES
12 hourt chart check    Assumed care at 1845. Pt resting in bed. A&ox 4  Anxious and tearful. Wanting to go home  Still c/o mild nausea and headache and pain. Medicated with ativan and oxy prn  No bm yet. Voiding adequately  Ambulating with steady gait  O2 on RA  Still on legal hold. Sitter at bedside. Denies SI  Call light within reach. Hourly rounding in place

## 2020-07-04 NOTE — PROGRESS NOTES
Assumed care of patient at 0715, pt is being monitored by the sitter. The patient is on a legal 2000.

## 2020-07-05 LAB
ANION GAP SERPL CALC-SCNC: 10 MMOL/L (ref 7–16)
BASOPHILS # BLD AUTO: 0.8 % (ref 0–1.8)
BASOPHILS # BLD: 0.04 K/UL (ref 0–0.12)
BUN SERPL-MCNC: 10 MG/DL (ref 8–22)
CALCIUM SERPL-MCNC: 8.6 MG/DL (ref 8.5–10.5)
CHLORIDE SERPL-SCNC: 104 MMOL/L (ref 96–112)
CO2 SERPL-SCNC: 25 MMOL/L (ref 20–33)
CREAT SERPL-MCNC: 0.57 MG/DL (ref 0.5–1.4)
EOSINOPHIL # BLD AUTO: 0.6 K/UL (ref 0–0.51)
EOSINOPHIL NFR BLD: 11.5 % (ref 0–6.9)
ERYTHROCYTE [DISTWIDTH] IN BLOOD BY AUTOMATED COUNT: 63.9 FL (ref 35.9–50)
GLUCOSE SERPL-MCNC: 116 MG/DL (ref 65–99)
HCT VFR BLD AUTO: 37.9 % (ref 37–47)
HGB BLD-MCNC: 12 G/DL (ref 12–16)
IMM GRANULOCYTES # BLD AUTO: 0.1 K/UL (ref 0–0.11)
IMM GRANULOCYTES NFR BLD AUTO: 1.9 % (ref 0–0.9)
LYMPHOCYTES # BLD AUTO: 1.29 K/UL (ref 1–4.8)
LYMPHOCYTES NFR BLD: 24.7 % (ref 22–41)
MCH RBC QN AUTO: 31 PG (ref 27–33)
MCHC RBC AUTO-ENTMCNC: 31.7 G/DL (ref 33.6–35)
MCV RBC AUTO: 97.9 FL (ref 81.4–97.8)
MONOCYTES # BLD AUTO: 0.62 K/UL (ref 0–0.85)
MONOCYTES NFR BLD AUTO: 11.9 % (ref 0–13.4)
NEUTROPHILS # BLD AUTO: 2.58 K/UL (ref 2–7.15)
NEUTROPHILS NFR BLD: 49.2 % (ref 44–72)
NRBC # BLD AUTO: 0 K/UL
NRBC BLD-RTO: 0 /100 WBC
PLATELET # BLD AUTO: 267 K/UL (ref 164–446)
PMV BLD AUTO: 9.8 FL (ref 9–12.9)
POTASSIUM SERPL-SCNC: 4.3 MMOL/L (ref 3.6–5.5)
RBC # BLD AUTO: 3.87 M/UL (ref 4.2–5.4)
SODIUM SERPL-SCNC: 139 MMOL/L (ref 135–145)
WBC # BLD AUTO: 5.2 K/UL (ref 4.8–10.8)

## 2020-07-05 PROCEDURE — 700111 HCHG RX REV CODE 636 W/ 250 OVERRIDE (IP): Performed by: HOSPITALIST

## 2020-07-05 PROCEDURE — 99232 SBSQ HOSP IP/OBS MODERATE 35: CPT | Performed by: INTERNAL MEDICINE

## 2020-07-05 PROCEDURE — 85025 COMPLETE CBC W/AUTO DIFF WBC: CPT

## 2020-07-05 PROCEDURE — 700102 HCHG RX REV CODE 250 W/ 637 OVERRIDE(OP): Performed by: PSYCHIATRY & NEUROLOGY

## 2020-07-05 PROCEDURE — 700102 HCHG RX REV CODE 250 W/ 637 OVERRIDE(OP): Performed by: INTERNAL MEDICINE

## 2020-07-05 PROCEDURE — 700102 HCHG RX REV CODE 250 W/ 637 OVERRIDE(OP): Performed by: HOSPITALIST

## 2020-07-05 PROCEDURE — 700111 HCHG RX REV CODE 636 W/ 250 OVERRIDE (IP): Performed by: INTERNAL MEDICINE

## 2020-07-05 PROCEDURE — A9270 NON-COVERED ITEM OR SERVICE: HCPCS | Performed by: HOSPITALIST

## 2020-07-05 PROCEDURE — A9270 NON-COVERED ITEM OR SERVICE: HCPCS | Performed by: PSYCHIATRY & NEUROLOGY

## 2020-07-05 PROCEDURE — A9270 NON-COVERED ITEM OR SERVICE: HCPCS | Performed by: INTERNAL MEDICINE

## 2020-07-05 PROCEDURE — 80048 BASIC METABOLIC PNL TOTAL CA: CPT

## 2020-07-05 PROCEDURE — 770004 HCHG ROOM/CARE - ONCOLOGY PRIVATE *

## 2020-07-05 PROCEDURE — 36415 COLL VENOUS BLD VENIPUNCTURE: CPT

## 2020-07-05 RX ADMIN — LORAZEPAM 0.5 MG: 1 TABLET ORAL at 13:05

## 2020-07-05 RX ADMIN — OMEPRAZOLE 20 MG: 20 CAPSULE, DELAYED RELEASE ORAL at 05:59

## 2020-07-05 RX ADMIN — ARIPIPRAZOLE 10 MG: 10 TABLET ORAL at 06:00

## 2020-07-05 RX ADMIN — GABAPENTIN 300 MG: 300 CAPSULE ORAL at 13:05

## 2020-07-05 RX ADMIN — OMEPRAZOLE 20 MG: 20 CAPSULE, DELAYED RELEASE ORAL at 18:26

## 2020-07-05 RX ADMIN — OXYCODONE 5 MG: 5 TABLET ORAL at 10:23

## 2020-07-05 RX ADMIN — LORAZEPAM 1 MG: 1 TABLET ORAL at 21:39

## 2020-07-05 RX ADMIN — LORAZEPAM 1.5 MG: 2 INJECTION INTRAMUSCULAR; INTRAVENOUS at 20:00

## 2020-07-05 RX ADMIN — GABAPENTIN 300 MG: 300 CAPSULE ORAL at 21:24

## 2020-07-05 RX ADMIN — OLANZAPINE 10 MG: 5 TABLET, FILM COATED ORAL at 21:24

## 2020-07-05 RX ADMIN — LEVOTHYROXINE SODIUM 75 MCG: 0.07 TABLET ORAL at 05:59

## 2020-07-05 RX ADMIN — NICOTINE POLACRILEX 2 MG: 2 GUM, CHEWING BUCCAL at 10:23

## 2020-07-05 RX ADMIN — HALOPERIDOL LACTATE 5 MG: 5 INJECTION, SOLUTION INTRAMUSCULAR at 01:46

## 2020-07-05 RX ADMIN — DIVALPROEX SODIUM 500 MG: 500 TABLET, FILM COATED, EXTENDED RELEASE ORAL at 21:24

## 2020-07-05 RX ADMIN — OXYCODONE 5 MG: 5 TABLET ORAL at 18:26

## 2020-07-05 RX ADMIN — SERTRALINE 50 MG: 100 TABLET, FILM COATED ORAL at 06:00

## 2020-07-05 RX ADMIN — OXYCODONE 5 MG: 5 TABLET ORAL at 05:59

## 2020-07-05 RX ADMIN — GABAPENTIN 300 MG: 300 CAPSULE ORAL at 05:59

## 2020-07-05 RX ADMIN — LORAZEPAM 1 MG: 1 TABLET ORAL at 03:30

## 2020-07-05 RX ADMIN — LORAZEPAM 3 MG: 2 TABLET ORAL at 07:40

## 2020-07-05 RX ADMIN — ONDANSETRON 4 MG: 2 INJECTION INTRAMUSCULAR; INTRAVENOUS at 20:01

## 2020-07-05 ASSESSMENT — LIFESTYLE VARIABLES
TOTAL SCORE: 24
TOTAL SCORE: 3
VISUAL DISTURBANCES: VERY MILD SENSITIVITY
TREMOR: TREMOR NOT VISIBLE BUT CAN BE FELT, FINGERTIP TO FINGERTIP
TREMOR: TREMOR NOT VISIBLE BUT CAN BE FELT, FINGERTIP TO FINGERTIP
ORIENTATION AND CLOUDING OF SENSORIUM: ORIENTED AND CAN DO SERIAL ADDITIONS
ANXIETY: MODERATELY ANXIOUS OR GUARDED, SO ANXIETY IS INFERRED
PAROXYSMAL SWEATS: NO SWEAT VISIBLE
PAROXYSMAL SWEATS: NO SWEAT VISIBLE
ORIENTATION AND CLOUDING OF SENSORIUM: ORIENTED AND CAN DO SERIAL ADDITIONS
ORIENTATION AND CLOUDING OF SENSORIUM: ORIENTED AND CAN DO SERIAL ADDITIONS
TREMOR: TREMOR NOT VISIBLE BUT CAN BE FELT, FINGERTIP TO FINGERTIP
HEADACHE, FULLNESS IN HEAD: MILD
AUDITORY DISTURBANCES: VERY MILD HARSHNESS OR ABILITY TO FRIGHTEN
TOTAL SCORE: VERY MILD ITCHING, PINS AND NEEDLES SENSATION, BURNING OR NUMBNESS
PAROXYSMAL SWEATS: NO SWEAT VISIBLE
PAROXYSMAL SWEATS: BARELY PERCEPTIBLE SWEATING. PALMS MOIST
ORIENTATION AND CLOUDING OF SENSORIUM: ORIENTED AND CAN DO SERIAL ADDITIONS
TREMOR: NO TREMOR
AGITATION: MODERATELY FIDGETY AND RESTLESS
NAUSEA AND VOMITING: MILD NAUSEA WITH NO VOMITING
ANXIETY: MILDLY ANXIOUS
AUDITORY DISTURBANCES: NOT PRESENT
TOTAL SCORE: 3
TOTAL SCORE: 10
TREMOR: TREMOR NOT VISIBLE BUT CAN BE FELT, FINGERTIP TO FINGERTIP
TREMOR: TREMOR NOT VISIBLE BUT CAN BE FELT, FINGERTIP TO FINGERTIP
AUDITORY DISTURBANCES: NOT PRESENT
AGITATION: NORMAL ACTIVITY
AUDITORY DISTURBANCES: VERY MILD HARSHNESS OR ABILITY TO FRIGHTEN
ANXIETY: MILDLY ANXIOUS
HEADACHE, FULLNESS IN HEAD: MILD
HEADACHE, FULLNESS IN HEAD: MODERATE
AUDITORY DISTURBANCES: NOT PRESENT
VISUAL DISTURBANCES: NOT PRESENT
HEADACHE, FULLNESS IN HEAD: MODERATE
VISUAL DISTURBANCES: NOT PRESENT
AGITATION: NORMAL ACTIVITY
ORIENTATION AND CLOUDING OF SENSORIUM: ORIENTED AND CAN DO SERIAL ADDITIONS
VISUAL DISTURBANCES: VERY MILD SENSITIVITY
AGITATION: NORMAL ACTIVITY
TOTAL SCORE: 16
ORIENTATION AND CLOUDING OF SENSORIUM: ORIENTED AND CAN DO SERIAL ADDITIONS
NAUSEA AND VOMITING: MILD NAUSEA WITH NO VOMITING
VISUAL DISTURBANCES: NOT PRESENT
PAROXYSMAL SWEATS: *
TREMOR: *
TOTAL SCORE: VERY MILD ITCHING, PINS AND NEEDLES SENSATION, BURNING OR NUMBNESS
AUDITORY DISTURBANCES: NOT PRESENT
TOTAL SCORE: 9
ANXIETY: MILDLY ANXIOUS
ANXIETY: MILDLY ANXIOUS
AUDITORY DISTURBANCES: NOT PRESENT
VISUAL DISTURBANCES: NOT PRESENT
ANXIETY: MODERATELY ANXIOUS OR GUARDED, SO ANXIETY IS INFERRED
PAROXYSMAL SWEATS: BARELY PERCEPTIBLE SWEATING. PALMS MOIST
NAUSEA AND VOMITING: NO NAUSEA AND NO VOMITING
AGITATION: NORMAL ACTIVITY
AGITATION: NORMAL ACTIVITY
HEADACHE, FULLNESS IN HEAD: MILD
TOTAL SCORE: 6
PAROXYSMAL SWEATS: NO SWEAT VISIBLE
NAUSEA AND VOMITING: MILD NAUSEA WITH NO VOMITING
PAROXYSMAL SWEATS: NO SWEAT VISIBLE
TOTAL SCORE: 6
ANXIETY: MILDLY ANXIOUS
ORIENTATION AND CLOUDING OF SENSORIUM: ORIENTED AND CAN DO SERIAL ADDITIONS
VISUAL DISTURBANCES: MODERATE SENSITIVITY
AUDITORY DISTURBANCES: NOT PRESENT
TOTAL SCORE: VERY MILD ITCHING, PINS AND NEEDLES SENSATION, BURNING OR NUMBNESS
TREMOR: TREMOR NOT VISIBLE BUT CAN BE FELT, FINGERTIP TO FINGERTIP
VISUAL DISTURBANCES: NOT PRESENT
HEADACHE, FULLNESS IN HEAD: VERY MILD
HEADACHE, FULLNESS IN HEAD: MODERATELY SEVERE
NAUSEA AND VOMITING: MILD NAUSEA WITH NO VOMITING
AGITATION: MODERATELY FIDGETY AND RESTLESS
NAUSEA AND VOMITING: *
NAUSEA AND VOMITING: NO NAUSEA AND NO VOMITING
ANXIETY: MILDLY ANXIOUS
AGITATION: NORMAL ACTIVITY
ORIENTATION AND CLOUDING OF SENSORIUM: ORIENTED AND CAN DO SERIAL ADDITIONS
TOTAL SCORE: MILD ITCHING, PINS AND NEEDLES SENSATION, BURNING OR NUMBNESS
TOTAL SCORE: VERY MILD ITCHING, PINS AND NEEDLES SENSATION, BURNING OR NUMBNESS
TOTAL SCORE: VERY MILD ITCHING, PINS AND NEEDLES SENSATION, BURNING OR NUMBNESS
HEADACHE, FULLNESS IN HEAD: VERY MILD
NAUSEA AND VOMITING: NO NAUSEA AND NO VOMITING

## 2020-07-05 ASSESSMENT — ENCOUNTER SYMPTOMS
SPEECH CHANGE: 0
FLANK PAIN: 0
MYALGIAS: 0
COUGH: 0
HEMOPTYSIS: 0
DOUBLE VISION: 0
NAUSEA: 0
EYE DISCHARGE: 0
PALPITATIONS: 0
BRUISES/BLEEDS EASILY: 0
VOMITING: 0
CHILLS: 0
SENSORY CHANGE: 0
ABDOMINAL PAIN: 1

## 2020-07-05 NOTE — PROGRESS NOTES
Bedside report received from night shift RN. Assumed care. Pt is A&Ox4. Patient is CIWA and on legal hold for SI. Pt is in bed. Pt has head and stomach pain at this time. Pt was updated on the plan of care for the day. All questions answered.   Pt has sitter at bedside. Pt is tearful and wants to go home.

## 2020-07-05 NOTE — PROGRESS NOTES
[facility] Medicine Daily Progress Note    Date of Service  7/5/2020    Chief Complaint  45 y.o. female admitted 6/26/2020 with ETOH withdrawal and SI.     Hospital Course  with a past medical history significant for bipolar disorder, depression and congestive heart failure who is brought in by EMS from [place] on 6/26 where she had originally presented for alcohol detox.  There she was found to be suicidal and was placed on a legal hold.  She was seen in the emergency department where she was diagnosed with alcoholism, suicidal ideation, and alcohol induced acute pancreatitis.  She was given IV fluids, Reglan, and a GI cocktail and admitted to the hospital. she went into alcohol withdrawal syndrome becoming too difficult to manage with standard CIWA protocol/Ativan and was transferred to ICU placed on Precedex ggt and now transferred back to Floor.     Interval Problem Update  She still feels that she is withdrawing, jittery.  Pending Psychiatry re-evaluation (see PN from Psych).  Sitter in room.  Pt denies SI.    Consultants/Specialty  Psych    Code Status  Full    Disposition  TBD    Review of Systems  Review of Systems   Constitutional: Positive for malaise/fatigue. Negative for chills.   HENT: Negative for congestion and tinnitus.    Eyes: Negative for double vision and discharge.   Respiratory: Negative for cough and hemoptysis.    Cardiovascular: Negative for palpitations.   Gastrointestinal: Positive for abdominal pain. Negative for nausea and vomiting.   Genitourinary: Negative for dysuria and flank pain.   Musculoskeletal: Negative for joint pain and myalgias.   Skin: Negative for rash.   Neurological: Negative for sensory change and speech change.   Endo/Heme/Allergies: Does not bruise/bleed easily.        Physical Exam  Temp:  [36.4 °C (97.5 °F)-37 °C (98.6 °F)] 36.4 °C (97.6 °F)  Pulse:  [65-87] 87  Resp:  [16-18] 16  BP: (100-117)/(49-68) 112/68  SpO2:  [92 %-96 %] 93 %    Physical Exam  Vitals signs  reviewed.   Constitutional:       Appearance: Normal appearance. She is ill-appearing.   HENT:      Head: Normocephalic.      Nose: No congestion.      Mouth/Throat:      Mouth: Mucous membranes are dry.   Eyes:      Pupils: Pupils are equal, round, and reactive to light.   Neck:      Musculoskeletal: Normal range of motion and neck supple. No muscular tenderness.   Cardiovascular:      Rate and Rhythm: Normal rate and regular rhythm.      Pulses: Normal pulses.      Heart sounds: Normal heart sounds.   Pulmonary:      Effort: Pulmonary effort is normal.      Breath sounds: Normal breath sounds.   Abdominal:      General: Bowel sounds are normal.      Tenderness: There is abdominal tenderness.      Comments: Epigastric ttp    Musculoskeletal:         General: No swelling.   Skin:     General: Skin is warm and dry.   Neurological:      Mental Status: She is alert.      Comments: Mildly somnolent but arousable and no neurological deficits noted         Fluids    Intake/Output Summary (Last 24 hours) at 7/5/2020 1128  Last data filed at 7/4/2020 1200  Gross per 24 hour   Intake 450 ml   Output --   Net 450 ml       Laboratory  Recent Labs     07/03/20  0456 07/04/20  0320 07/05/20  0300   WBC 5.0 5.4 5.2   RBC 3.67* 3.86* 3.87*   HEMOGLOBIN 11.3* 11.9* 12.0   HEMATOCRIT 35.4* 38.0 37.9   MCV 96.5 98.4* 97.9*   MCH 30.8 30.8 31.0   MCHC 31.9* 31.3* 31.7*   RDW 64.9* 65.4* 63.9*   PLATELETCT 132* 235 267   MPV 10.1 10.0 9.8     Recent Labs     07/03/20  0456 07/04/20  0320 07/05/20  0300   SODIUM 140 135 139   POTASSIUM 3.9 4.3 4.3   CHLORIDE 106 103 104   CO2 24 26 25   GLUCOSE 118* 116* 116*   BUN 9 9 10   CREATININE 0.69 0.64 0.57   CALCIUM 8.2* 8.6 8.6                   Imaging  IR-MIDLINE CATHETER INSERTION WO GUIDANCE > AGE 3   Final Result                  Ultrasound-guided midline placement performed by qualified nursing staff    as above.          DX-CHEST-PORTABLE (1 VIEW)   Final Result      No acute  cardiopulmonary abnormality.      CT-ABDOMEN-PELVIS WITH   Final Result         1. Hepatic steatosis.      2. There is a small sliding hiatal hernia otherwise the remainder of the CT scan of the abdomen and pelvis is unremarkable.           Assessment/Plan  * Alcohol withdrawal (HCC)- (present on admission)  Assessment & Plan  Patient admitted initally to telemetry on CIWA and required ICU transfer, was on Tele, now on floor as of 7/4 night.  Cont CIWA protocol.  Not quite resolved from withdrawal yet.      Suicide ideation- (present on admission)  Assessment & Plan  Currently the patient denies suicidal and homicidal ideation  Legal hold   Psychiatry to see the patient    Gastroesophageal reflux disease with esophagitis  Assessment & Plan  Start omeprazole     Essential hypertension- (present on admission)  Assessment & Plan  controlled  Continue current home propanolol  IV as needed medications have been ordered    Manic depressive illness (HCC)- (present on admission)  Assessment & Plan  Continue olanzapine, sertraline, divalproex, gabapentin, aripiprazole  Appreciate psychiatric input    Elevated liver function tests- (present on admission)  Assessment & Plan  Due to alcohol abuse    Abdominal pain  Assessment & Plan  Suspect likely due to gastritis rather than pancreatitis   Cont with IVF< anti emetics, pain control   resolved      Tobacco abuse- (present on admission)  Assessment & Plan  Cessation counseling.     Thrombocytopenia (HCC)- (present on admission)  Assessment & Plan  Continue to trend    Hypothyroidism- (present on admission)  Assessment & Plan  Continue home thyroid medication       VTE prophylaxis:  SCDs

## 2020-07-05 NOTE — PROGRESS NOTES
Received report from day shift RN. POC discussed with patient, pt verbalizes understanding and in agreement. A&Ox4; tearful and anxious. CIWA protocol in place. 1:1 Sitter at bedside; all safety precautions in place. R PIV patent; SL. R Midline patent; no blood return; SL. Bed alarm on, call light in reach, bed in low position, hourly rounding in place.

## 2020-07-05 NOTE — CARE PLAN
Problem: Safety  Goal: Will remain free from falls  Outcome: PROGRESSING AS EXPECTED     Problem: Psychosocial Needs:  Goal: Level of anxiety will decrease  Outcome: PROGRESSING SLOWER THAN EXPECTED  Flowsheets (Taken 7/5/2020 0900)  Patient Behaviors:   Anxious   Tearful  Note: Patient reports feeling extremely anxious while awake but has no trouble falling asleep. Continue to monitor per CIWA protocol.

## 2020-07-06 PROBLEM — R10.9 ABDOMINAL PAIN: Status: RESOLVED | Noted: 2020-07-01 | Resolved: 2020-07-06

## 2020-07-06 PROBLEM — Z72.0 TOBACCO ABUSE: Status: RESOLVED | Noted: 2020-06-29 | Resolved: 2020-07-06

## 2020-07-06 PROBLEM — D69.6 THROMBOCYTOPENIA (HCC): Status: RESOLVED | Noted: 2019-07-30 | Resolved: 2020-07-06

## 2020-07-06 LAB
ANION GAP SERPL CALC-SCNC: 11 MMOL/L (ref 7–16)
BASOPHILS # BLD AUTO: 0.9 % (ref 0–1.8)
BASOPHILS # BLD: 0.07 K/UL (ref 0–0.12)
BUN SERPL-MCNC: 11 MG/DL (ref 8–22)
CALCIUM SERPL-MCNC: 9 MG/DL (ref 8.5–10.5)
CHLORIDE SERPL-SCNC: 101 MMOL/L (ref 96–112)
CO2 SERPL-SCNC: 25 MMOL/L (ref 20–33)
CREAT SERPL-MCNC: 0.54 MG/DL (ref 0.5–1.4)
EOSINOPHIL # BLD AUTO: 0.74 K/UL (ref 0–0.51)
EOSINOPHIL NFR BLD: 9.3 % (ref 0–6.9)
ERYTHROCYTE [DISTWIDTH] IN BLOOD BY AUTOMATED COUNT: 62.7 FL (ref 35.9–50)
GLUCOSE SERPL-MCNC: 111 MG/DL (ref 65–99)
HCT VFR BLD AUTO: 38 % (ref 37–47)
HGB BLD-MCNC: 12.2 G/DL (ref 12–16)
IMM GRANULOCYTES # BLD AUTO: 0.28 K/UL (ref 0–0.11)
IMM GRANULOCYTES NFR BLD AUTO: 3.5 % (ref 0–0.9)
LYMPHOCYTES # BLD AUTO: 1.84 K/UL (ref 1–4.8)
LYMPHOCYTES NFR BLD: 23 % (ref 22–41)
MCH RBC QN AUTO: 30.9 PG (ref 27–33)
MCHC RBC AUTO-ENTMCNC: 32.1 G/DL (ref 33.6–35)
MCV RBC AUTO: 96.2 FL (ref 81.4–97.8)
MONOCYTES # BLD AUTO: 0.93 K/UL (ref 0–0.85)
MONOCYTES NFR BLD AUTO: 11.6 % (ref 0–13.4)
NEUTROPHILS # BLD AUTO: 4.13 K/UL (ref 2–7.15)
NEUTROPHILS NFR BLD: 51.7 % (ref 44–72)
NRBC # BLD AUTO: 0 K/UL
NRBC BLD-RTO: 0 /100 WBC
PLATELET # BLD AUTO: 335 K/UL (ref 164–446)
PMV BLD AUTO: 9.4 FL (ref 9–12.9)
POTASSIUM SERPL-SCNC: 4.1 MMOL/L (ref 3.6–5.5)
RBC # BLD AUTO: 3.95 M/UL (ref 4.2–5.4)
SODIUM SERPL-SCNC: 137 MMOL/L (ref 135–145)
WBC # BLD AUTO: 8 K/UL (ref 4.8–10.8)

## 2020-07-06 PROCEDURE — A9270 NON-COVERED ITEM OR SERVICE: HCPCS | Performed by: HOSPITALIST

## 2020-07-06 PROCEDURE — 700102 HCHG RX REV CODE 250 W/ 637 OVERRIDE(OP): Performed by: INTERNAL MEDICINE

## 2020-07-06 PROCEDURE — 36415 COLL VENOUS BLD VENIPUNCTURE: CPT

## 2020-07-06 PROCEDURE — 80048 BASIC METABOLIC PNL TOTAL CA: CPT

## 2020-07-06 PROCEDURE — 700102 HCHG RX REV CODE 250 W/ 637 OVERRIDE(OP): Performed by: PSYCHIATRY & NEUROLOGY

## 2020-07-06 PROCEDURE — 85025 COMPLETE CBC W/AUTO DIFF WBC: CPT

## 2020-07-06 PROCEDURE — 700102 HCHG RX REV CODE 250 W/ 637 OVERRIDE(OP): Performed by: HOSPITALIST

## 2020-07-06 PROCEDURE — 99232 SBSQ HOSP IP/OBS MODERATE 35: CPT | Performed by: INTERNAL MEDICINE

## 2020-07-06 PROCEDURE — A9270 NON-COVERED ITEM OR SERVICE: HCPCS | Performed by: PSYCHIATRY & NEUROLOGY

## 2020-07-06 PROCEDURE — A9270 NON-COVERED ITEM OR SERVICE: HCPCS | Performed by: INTERNAL MEDICINE

## 2020-07-06 PROCEDURE — 770004 HCHG ROOM/CARE - ONCOLOGY PRIVATE *

## 2020-07-06 RX ADMIN — ACETAMINOPHEN 650 MG: 325 TABLET, FILM COATED ORAL at 22:31

## 2020-07-06 RX ADMIN — ARIPIPRAZOLE 10 MG: 10 TABLET ORAL at 04:33

## 2020-07-06 RX ADMIN — DIVALPROEX SODIUM 500 MG: 500 TABLET, FILM COATED, EXTENDED RELEASE ORAL at 21:26

## 2020-07-06 RX ADMIN — LORAZEPAM 1 MG: 1 TABLET ORAL at 10:15

## 2020-07-06 RX ADMIN — LORAZEPAM 1 MG: 1 TABLET ORAL at 04:32

## 2020-07-06 RX ADMIN — GABAPENTIN 300 MG: 300 CAPSULE ORAL at 04:32

## 2020-07-06 RX ADMIN — LORAZEPAM 1 MG: 1 TABLET ORAL at 21:48

## 2020-07-06 RX ADMIN — SERTRALINE 50 MG: 100 TABLET, FILM COATED ORAL at 04:33

## 2020-07-06 RX ADMIN — GABAPENTIN 300 MG: 300 CAPSULE ORAL at 21:26

## 2020-07-06 RX ADMIN — OXYCODONE 5 MG: 5 TABLET ORAL at 02:35

## 2020-07-06 RX ADMIN — NICOTINE POLACRILEX 2 MG: 2 GUM, CHEWING BUCCAL at 23:43

## 2020-07-06 RX ADMIN — OXYCODONE 5 MG: 5 TABLET ORAL at 08:27

## 2020-07-06 RX ADMIN — OMEPRAZOLE 20 MG: 20 CAPSULE, DELAYED RELEASE ORAL at 18:31

## 2020-07-06 RX ADMIN — OMEPRAZOLE 20 MG: 20 CAPSULE, DELAYED RELEASE ORAL at 04:34

## 2020-07-06 RX ADMIN — OXYCODONE 5 MG: 5 TABLET ORAL at 18:25

## 2020-07-06 RX ADMIN — LEVOTHYROXINE SODIUM 75 MCG: 0.07 TABLET ORAL at 04:31

## 2020-07-06 RX ADMIN — OLANZAPINE 10 MG: 5 TABLET, FILM COATED ORAL at 21:26

## 2020-07-06 ASSESSMENT — LIFESTYLE VARIABLES
PAROXYSMAL SWEATS: NO SWEAT VISIBLE
ANXIETY: NO ANXIETY (AT EASE)
PAROXYSMAL SWEATS: NO SWEAT VISIBLE
VISUAL DISTURBANCES: NOT PRESENT
TOTAL SCORE: VERY MILD ITCHING, PINS AND NEEDLES SENSATION, BURNING OR NUMBNESS
NAUSEA AND VOMITING: MILD NAUSEA WITH NO VOMITING
VISUAL DISTURBANCES: NOT PRESENT
AUDITORY DISTURBANCES: NOT PRESENT
TREMOR: NO TREMOR
PAROXYSMAL SWEATS: NO SWEAT VISIBLE
HEADACHE, FULLNESS IN HEAD: MODERATE
AGITATION: NORMAL ACTIVITY
TREMOR: NO TREMOR
NAUSEA AND VOMITING: NO NAUSEA AND NO VOMITING
AUDITORY DISTURBANCES: VERY MILD HARSHNESS OR ABILITY TO FRIGHTEN
TOTAL SCORE: 3
AUDITORY DISTURBANCES: NOT PRESENT
ORIENTATION AND CLOUDING OF SENSORIUM: ORIENTED AND CAN DO SERIAL ADDITIONS
NAUSEA AND VOMITING: NO NAUSEA AND NO VOMITING
TOTAL SCORE: 10
ANXIETY: MODERATELY ANXIOUS OR GUARDED, SO ANXIETY IS INFERRED
NAUSEA AND VOMITING: NO NAUSEA AND NO VOMITING
AUDITORY DISTURBANCES: NOT PRESENT
ANXIETY: MILDLY ANXIOUS
VISUAL DISTURBANCES: NOT PRESENT
TREMOR: TREMOR NOT VISIBLE BUT CAN BE FELT, FINGERTIP TO FINGERTIP
TOTAL SCORE: 10
VISUAL DISTURBANCES: NOT PRESENT
ORIENTATION AND CLOUDING OF SENSORIUM: ORIENTED AND CAN DO SERIAL ADDITIONS
AGITATION: NORMAL ACTIVITY
ORIENTATION AND CLOUDING OF SENSORIUM: ORIENTED AND CAN DO SERIAL ADDITIONS
AGITATION: NORMAL ACTIVITY
TOTAL SCORE: 0
ORIENTATION AND CLOUDING OF SENSORIUM: ORIENTED AND CAN DO SERIAL ADDITIONS
AUDITORY DISTURBANCES: NOT PRESENT
HEADACHE, FULLNESS IN HEAD: MODERATE
ANXIETY: *
TREMOR: NO TREMOR
AGITATION: NORMAL ACTIVITY
TREMOR: TREMOR NOT VISIBLE BUT CAN BE FELT, FINGERTIP TO FINGERTIP
TOTAL SCORE: 8
TREMOR: *
PAROXYSMAL SWEATS: NO SWEAT VISIBLE
PAROXYSMAL SWEATS: NO SWEAT VISIBLE
ANXIETY: MODERATELY ANXIOUS OR GUARDED, SO ANXIETY IS INFERRED
HEADACHE, FULLNESS IN HEAD: VERY MILD
SUBSTANCE_ABUSE: 1
VISUAL DISTURBANCES: NOT PRESENT
VISUAL DISTURBANCES: NOT PRESENT
HEADACHE, FULLNESS IN HEAD: NOT PRESENT
AGITATION: SOMEWHAT MORE THAN NORMAL ACTIVITY
HEADACHE, FULLNESS IN HEAD: VERY MILD
NAUSEA AND VOMITING: NO NAUSEA AND NO VOMITING
PAROXYSMAL SWEATS: NO SWEAT VISIBLE
ANXIETY: MILDLY ANXIOUS
ORIENTATION AND CLOUDING OF SENSORIUM: ORIENTED AND CAN DO SERIAL ADDITIONS
TOTAL SCORE: 3
ORIENTATION AND CLOUDING OF SENSORIUM: ORIENTED AND CAN DO SERIAL ADDITIONS
TOTAL SCORE: VERY MILD ITCHING, PINS AND NEEDLES SENSATION, BURNING OR NUMBNESS
NAUSEA AND VOMITING: NO NAUSEA AND NO VOMITING
AUDITORY DISTURBANCES: NOT PRESENT
AGITATION: NORMAL ACTIVITY
HEADACHE, FULLNESS IN HEAD: SEVERE

## 2020-07-06 ASSESSMENT — FIBROSIS 4 INDEX: FIB4 SCORE: 1.43

## 2020-07-06 NOTE — PROGRESS NOTES
Bedside report received from night shift RN. Assumed care. Pt is alert and oriented. Pt is on [facility] protocol for alcohol withdrawal.  Pt is in bed. Pt reports chronic head and stomach pain at this time. Pt was updated on the plan of care for the day. All questions answered.   Pt has call light within reach and bed is in lowest position.  All fall precautions in place. Pt had no other needs at this time, hourly rounding in place.  Patient would like to go home, reports she is feeling find and can manage herself at home. Doctor notified.

## 2020-07-06 NOTE — CARE PLAN
Problem: Safety  Goal: Will remain free from falls  Outcome: PROGRESSING AS EXPECTED   Fall precautions in place. Bed on the lowest position, non skid socks on, call light within reach, educated on calling for assistance, rounding in place.  1:1 sitter at bedside.    Problem: Communication  Goal: The ability to communicate needs accurately and effectively will improve  Outcome: PROGRESSING AS EXPECTED  Patient updated on POC. All patient questions answered at this time.

## 2020-07-06 NOTE — PROGRESS NOTES
[facility] Medicine Daily Progress Note    Date of Service  7/6/2020    Chief Complaint  45 y.o. female admitted 6/26/2020 with ETOH withdrawal and SI.     Hospital Course  with a past medical history significant for bipolar disorder, depression and congestive heart failure who is brought in by EMS from [place] on 6/26 where she had originally presented for alcohol detox.  There she was found to be suicidal and was placed on a legal hold.  She was seen in the emergency department where she was diagnosed with alcoholism, suicidal ideation, and alcohol induced acute pancreatitis.  She was given IV fluids, Reglan, and a GI cocktail and admitted to the hospital. she went into alcohol withdrawal syndrome becoming too difficult to manage with standard CIWA protocol/Ativan and was transferred to ICU placed on Precedex ggt and now transferred back to Floor.     Interval Problem Update  Medically stable.  Will DC telemetry monitor today.  Continue sitter.  Pending Psych re-evaluation.      Consultants/Specialty  Psych    Code Status  Full    Disposition  TBD    Review of Systems  Review of Systems   Psychiatric/Behavioral: Positive for substance abuse. Negative for suicidal ideas.        Physical Exam  Temp:  [36.6 °C (97.8 °F)-36.8 °C (98.2 °F)] 36.7 °C (98.1 °F)  Pulse:  [] 68  Resp:  [16-20] 16  BP: ()/(56-69) 103/59  SpO2:  [92 %-96 %] 92 %    Physical Exam  Vitals signs reviewed.   Constitutional:       Appearance: Normal appearance.   HENT:      Head: Normocephalic.   Eyes:      General: No scleral icterus.  Cardiovascular:      Rate and Rhythm: Normal rate.   Pulmonary:      Effort: Pulmonary effort is normal.   Abdominal:      General: Abdomen is flat.      Palpations: Abdomen is soft.   Musculoskeletal:         General: No swelling.   Skin:     General: Skin is dry.      Coloration: Skin is not jaundiced.   Neurological:      Mental Status: She is alert and oriented to person, place, and time.          Fluids  No intake or output data in the 24 hours ending 07/06/20 1443    Laboratory  Recent Labs     07/04/20  0320 07/05/20  0300 07/06/20  0552   WBC 5.4 5.2 8.0   RBC 3.86* 3.87* 3.95*   HEMOGLOBIN 11.9* 12.0 12.2   HEMATOCRIT 38.0 37.9 38.0   MCV 98.4* 97.9* 96.2   MCH 30.8 31.0 30.9   MCHC 31.3* 31.7* 32.1*   RDW 65.4* 63.9* 62.7*   PLATELETCT 235 267 335   MPV 10.0 9.8 9.4     Recent Labs     07/04/20  0320 07/05/20  0300 07/06/20  0552   SODIUM 135 139 137   POTASSIUM 4.3 4.3 4.1   CHLORIDE 103 104 101   CO2 26 25 25   GLUCOSE 116* 116* 111*   BUN 9 10 11   CREATININE 0.64 0.57 0.54   CALCIUM 8.6 8.6 9.0                   Imaging  IR-MIDLINE CATHETER INSERTION WO GUIDANCE > AGE 3   Final Result                  Ultrasound-guided midline placement performed by qualified nursing staff    as above.          DX-CHEST-PORTABLE (1 VIEW)   Final Result      No acute cardiopulmonary abnormality.      CT-ABDOMEN-PELVIS WITH   Final Result         1. Hepatic steatosis.      2. There is a small sliding hiatal hernia otherwise the remainder of the CT scan of the abdomen and pelvis is unremarkable.           Assessment/Plan  * Suicide ideation- (present on admission)  Assessment & Plan  Currently the patient denies suicidal and homicidal ideation  Legal hold   Psychiatry to see the patient    Alcohol withdrawal (HCC)- (present on admission)  Assessment & Plan  Patient admitted initally to telemetry on [facility] and required ICU transfer, was on Tele, now on floor as of 7/4 night.  Cont [facility] protocol.  Will DC telemetry monitor today.    Gastroesophageal reflux disease with esophagitis  Assessment & Plan  On omeprazole     Essential hypertension- (present on admission)  Assessment & Plan  controlled  Continue current home propanolol  IV as needed medications have been ordered    Manic depressive illness (HCC)- (present on admission)  Assessment & Plan  Continue olanzapine, sertraline, divalproex, gabapentin,  aripiprazole  Appreciate psychiatric input    Elevated liver function tests- (present on admission)  Assessment & Plan  Due to alcohol abuse    Hypothyroidism- (present on admission)  Assessment & Plan  Continue home thyroid medication       VTE prophylaxis:  SCDs

## 2020-07-06 NOTE — PROGRESS NOTES
Received report from AUREA Loyd. Assumed care at 1900. Pt is A&Ox4 at this time, reports pain nausea and vomiting, PRN medications given per MAR. Pt has R midline SL, R PIV SL. Pt ambulated with assistance of 1. CIWA protocol in place. 1:1 PSA sitter at bedside. Assessed and discussed plan of care. Bed in lowest position, call light within reach, educated patient to call for assistance, non skid socks on, rounding in place.

## 2020-07-06 NOTE — DISCHARGE PLANNING
"Anticipated Discharge Disposition: TBD: Home possibly if cleared by Psych    Action: LSW met with Pt at [facility] to discuss discharge plan and to provide her with resources.  Pt was emotional during the conversation and reported multiple times that she \"was not suicidal.\"  When asked if she had a plan by this LSW, the Pt stated, \"No I don't have a plan.  I don't want to kill myself.  My two other children need me.\"  Pt went on to reported she has one child that came from [place] and one that came from [place] to be together as a family for tomorrow.  Tomorrow is the one year anniversary of her daughters death who  in a car accident by a drunk . Pt reported after this current detox that she \"will never ever drink again.\"  Pt went on to report her  does not drink and \"kicked out a roommate\" that she was drinking with.  Pt reported her  is not abusive and loves her \"dearly.\"  Pt reported her father recently passed away and she inherited a lot of money that she intends on buying a home in [facility] with.  Many times within the conversation the Pt emotionally reported she \"wants to go home.\"   LSW listened empathetically and provided the Pt with compassionate responses.      LSW provided the Pt with chemical dependency and behavioral health resources.  Pt accepted resources.  LSW answered all questions the Pt had in regards to these resources.     Barriers to Discharge: Medical clearance, Psych clearance    Plan: LSW will assist as needed    "

## 2020-07-06 NOTE — DISCHARGE PLANNING
Received Order in Response to Request for Court Ordered Involuntary Admission from the court continuing pt until 7/9 at 0900. Sent copy to unit LSW.

## 2020-07-07 VITALS
HEIGHT: 69 IN | TEMPERATURE: 98.6 F | BODY MASS INDEX: 23.84 KG/M2 | HEART RATE: 65 BPM | DIASTOLIC BLOOD PRESSURE: 70 MMHG | WEIGHT: 160.94 LBS | RESPIRATION RATE: 16 BRPM | SYSTOLIC BLOOD PRESSURE: 110 MMHG | OXYGEN SATURATION: 95 %

## 2020-07-07 PROBLEM — R45.851 SUICIDE IDEATION: Status: RESOLVED | Noted: 2020-06-26 | Resolved: 2020-07-07

## 2020-07-07 PROBLEM — R79.89 ELEVATED LIVER FUNCTION TESTS: Status: RESOLVED | Noted: 2019-07-30 | Resolved: 2020-07-07

## 2020-07-07 PROBLEM — F10.939 ALCOHOL WITHDRAWAL (HCC): Status: RESOLVED | Noted: 2019-07-30 | Resolved: 2020-07-07

## 2020-07-07 LAB
ANION GAP SERPL CALC-SCNC: 10 MMOL/L (ref 7–16)
BASOPHILS # BLD AUTO: 1 % (ref 0–1.8)
BASOPHILS # BLD: 0.09 K/UL (ref 0–0.12)
BUN SERPL-MCNC: 8 MG/DL (ref 8–22)
CALCIUM SERPL-MCNC: 9.1 MG/DL (ref 8.5–10.5)
CHLORIDE SERPL-SCNC: 103 MMOL/L (ref 96–112)
CO2 SERPL-SCNC: 24 MMOL/L (ref 20–33)
CREAT SERPL-MCNC: 0.58 MG/DL (ref 0.5–1.4)
EOSINOPHIL # BLD AUTO: 0.71 K/UL (ref 0–0.51)
EOSINOPHIL NFR BLD: 7.8 % (ref 0–6.9)
ERYTHROCYTE [DISTWIDTH] IN BLOOD BY AUTOMATED COUNT: 60.8 FL (ref 35.9–50)
GLUCOSE SERPL-MCNC: 105 MG/DL (ref 65–99)
HCT VFR BLD AUTO: 39.6 % (ref 37–47)
HGB BLD-MCNC: 12.8 G/DL (ref 12–16)
IMM GRANULOCYTES # BLD AUTO: 0.26 K/UL (ref 0–0.11)
IMM GRANULOCYTES NFR BLD AUTO: 2.9 % (ref 0–0.9)
LYMPHOCYTES # BLD AUTO: 1.69 K/UL (ref 1–4.8)
LYMPHOCYTES NFR BLD: 18.6 % (ref 22–41)
MCH RBC QN AUTO: 30.4 PG (ref 27–33)
MCHC RBC AUTO-ENTMCNC: 32.3 G/DL (ref 33.6–35)
MCV RBC AUTO: 94.1 FL (ref 81.4–97.8)
MONOCYTES # BLD AUTO: 0.81 K/UL (ref 0–0.85)
MONOCYTES NFR BLD AUTO: 8.9 % (ref 0–13.4)
NEUTROPHILS # BLD AUTO: 5.54 K/UL (ref 2–7.15)
NEUTROPHILS NFR BLD: 60.8 % (ref 44–72)
NRBC # BLD AUTO: 0 K/UL
NRBC BLD-RTO: 0 /100 WBC
PLATELET # BLD AUTO: 389 K/UL (ref 164–446)
PMV BLD AUTO: 9.1 FL (ref 9–12.9)
POTASSIUM SERPL-SCNC: 3.9 MMOL/L (ref 3.6–5.5)
RBC # BLD AUTO: 4.21 M/UL (ref 4.2–5.4)
SODIUM SERPL-SCNC: 137 MMOL/L (ref 135–145)
WBC # BLD AUTO: 9.1 K/UL (ref 4.8–10.8)

## 2020-07-07 PROCEDURE — A9270 NON-COVERED ITEM OR SERVICE: HCPCS | Performed by: PSYCHIATRY & NEUROLOGY

## 2020-07-07 PROCEDURE — 700102 HCHG RX REV CODE 250 W/ 637 OVERRIDE(OP): Performed by: PSYCHIATRY & NEUROLOGY

## 2020-07-07 PROCEDURE — 85025 COMPLETE CBC W/AUTO DIFF WBC: CPT

## 2020-07-07 PROCEDURE — 80048 BASIC METABOLIC PNL TOTAL CA: CPT

## 2020-07-07 PROCEDURE — 99232 SBSQ HOSP IP/OBS MODERATE 35: CPT | Performed by: PSYCHIATRY & NEUROLOGY

## 2020-07-07 PROCEDURE — A9270 NON-COVERED ITEM OR SERVICE: HCPCS | Performed by: HOSPITALIST

## 2020-07-07 PROCEDURE — 99239 HOSP IP/OBS DSCHRG MGMT >30: CPT | Performed by: INTERNAL MEDICINE

## 2020-07-07 PROCEDURE — 36415 COLL VENOUS BLD VENIPUNCTURE: CPT

## 2020-07-07 PROCEDURE — 700102 HCHG RX REV CODE 250 W/ 637 OVERRIDE(OP): Performed by: HOSPITALIST

## 2020-07-07 RX ORDER — SERTRALINE HYDROCHLORIDE 100 MG/1
100 TABLET, FILM COATED ORAL DAILY
Qty: 30 TAB | Refills: 0 | Status: SHIPPED | OUTPATIENT
Start: 2020-07-08

## 2020-07-07 RX ORDER — ARIPIPRAZOLE 10 MG/1
10 TABLET ORAL DAILY
Qty: 30 TAB | Refills: 0 | Status: SHIPPED | OUTPATIENT
Start: 2020-07-08

## 2020-07-07 RX ORDER — DIVALPROEX SODIUM 500 MG/1
500 TABLET, EXTENDED RELEASE ORAL
Qty: 30 TAB | Refills: 0 | Status: SHIPPED | OUTPATIENT
Start: 2020-07-07

## 2020-07-07 RX ORDER — OXYCODONE HYDROCHLORIDE 5 MG/1
5 TABLET ORAL EVERY 6 HOURS PRN
Qty: 15 TAB | Refills: 0 | Status: SHIPPED | OUTPATIENT
Start: 2020-07-07 | End: 2020-07-11

## 2020-07-07 RX ORDER — SERTRALINE HYDROCHLORIDE 100 MG/1
100 TABLET, FILM COATED ORAL DAILY
Status: DISCONTINUED | OUTPATIENT
Start: 2020-07-08 | End: 2020-07-07 | Stop reason: HOSPADM

## 2020-07-07 RX ADMIN — OMEPRAZOLE 20 MG: 20 CAPSULE, DELAYED RELEASE ORAL at 05:29

## 2020-07-07 RX ADMIN — ACETAMINOPHEN 650 MG: 325 TABLET, FILM COATED ORAL at 12:54

## 2020-07-07 RX ADMIN — SERTRALINE 50 MG: 100 TABLET, FILM COATED ORAL at 05:29

## 2020-07-07 RX ADMIN — ARIPIPRAZOLE 10 MG: 10 TABLET ORAL at 05:29

## 2020-07-07 RX ADMIN — ACETAMINOPHEN 650 MG: 325 TABLET, FILM COATED ORAL at 05:28

## 2020-07-07 RX ADMIN — GABAPENTIN 300 MG: 300 CAPSULE ORAL at 12:54

## 2020-07-07 RX ADMIN — LEVOTHYROXINE SODIUM 75 MCG: 0.07 TABLET ORAL at 05:28

## 2020-07-07 RX ADMIN — GABAPENTIN 300 MG: 300 CAPSULE ORAL at 05:29

## 2020-07-07 ASSESSMENT — LIFESTYLE VARIABLES
NAUSEA AND VOMITING: NO NAUSEA AND NO VOMITING
PAROXYSMAL SWEATS: NO SWEAT VISIBLE
ORIENTATION AND CLOUDING OF SENSORIUM: ORIENTED AND CAN DO SERIAL ADDITIONS
AUDITORY DISTURBANCES: NOT PRESENT
AUDITORY DISTURBANCES: NOT PRESENT
TOTAL SCORE: 3
NAUSEA AND VOMITING: MILD NAUSEA WITH NO VOMITING
TREMOR: NO TREMOR
AUDITORY DISTURBANCES: NOT PRESENT
ANXIETY: NO ANXIETY (AT EASE)
HEADACHE, FULLNESS IN HEAD: MODERATE
HEADACHE, FULLNESS IN HEAD: NOT PRESENT
AGITATION: NORMAL ACTIVITY
AGITATION: NORMAL ACTIVITY
ORIENTATION AND CLOUDING OF SENSORIUM: ORIENTED AND CAN DO SERIAL ADDITIONS
VISUAL DISTURBANCES: NOT PRESENT
TREMOR: NO TREMOR
TOTAL SCORE: 0
TOTAL SCORE: 4
VISUAL DISTURBANCES: NOT PRESENT
TREMOR: NO TREMOR
ANXIETY: NO ANXIETY (AT EASE)
ANXIETY: NO ANXIETY (AT EASE)
ORIENTATION AND CLOUDING OF SENSORIUM: ORIENTED AND CAN DO SERIAL ADDITIONS
VISUAL DISTURBANCES: NOT PRESENT
NAUSEA AND VOMITING: NO NAUSEA AND NO VOMITING
PAROXYSMAL SWEATS: NO SWEAT VISIBLE
AGITATION: NORMAL ACTIVITY
HEADACHE, FULLNESS IN HEAD: MODERATE
PAROXYSMAL SWEATS: NO SWEAT VISIBLE

## 2020-07-07 NOTE — PROGRESS NOTES
"Received report from AUREA Loyd. Assumed care at 1900. Pt is A&Ox4 at this time, reports pain, PRN medication given per MAR. Denies nausea/vomiting. Patient exhibits decreased anxiety throughout the shift, as assessed by this RN. Patient states \"I want to go home, I don't want to take anymore Ativan and Oxycodone. I just want to take Tylenol for my headache.\" Last BM 7/6/20. Pt has R Midline SL. Pt ambulated SBA. CIWA protocol in place. Tele monitor and  in use. 1:1 sitter at bedside. Assessed and discussed plan of care. Bed in lowest position, call light within reach, educated patient to call for assistance, non skid socks on, rounding in place.      "

## 2020-07-07 NOTE — PROGRESS NOTES
Legal hold discontinued. Belongings returned to patient. Patient given oxy script. Other scripts e-scribed to [facility] pharmacy on [address]. Midline catheter removed. Patient discharged with spouse.

## 2020-07-07 NOTE — CARE PLAN
Problem: Safety  Goal: Will remain free from injury  Outcome: PROGRESSING AS EXPECTED     Problem: Psychosocial Needs:  Goal: Level of anxiety will decrease  Outcome: PROGRESSING AS EXPECTED     Problem: Infection  Goal: Will remain free from infection  Outcome: PROGRESSING AS EXPECTED  Intervention: Implement standard precautions and perform hand washing before and after patient contact  Note: Standard infection prevention in place

## 2020-07-07 NOTE — PROGRESS NOTES
Spiritual Care Note        Patient's Name: Nancy Olvera   MRN: [id number]    YOB: 1975   Age and Gender: 45 y.o. female   Service Area/Unit: CNU   Room (and Bed): R314   Ethnicity or Nationality: white   Primary Language: English   Orthodoxy/Spiritual Preference: Scientologist   Place of Residence: [place], [place]   Family/Friends/Others Present: spouse, Vinod   Medical Diagnoses: suicidal ideation  alcohol withdrawal  gastroesophageal reflux disease with esophagitis  essential hypertension  manic depressive illness  elevated liver function tests  hypothyroidism  alcohol use disorder  methamphetamine use disorder  h/o  bipolar I disorder         Code Status: Full Code    Date of Admission: 6/26/2020   Length of Stay: 11 days        Spiritual Screen Results:  Is your spiritual health or inner well-being important to you as you cope with your medical condition?   No  Would you like to receive a visit from our Spiritual Care team or your own Jew or spiritual leader?   No  Was spiritual care education provided to the patient?   Declined       Visited With:   patient and     Nature of the Visit:   Initial, On Shift    Continue Visiting:   upon request    General Visit:   Yes    Referral From/Origin of Request:   Epic nursing    Observations/Symptoms:   Patient sitting on edge of bed, alert, pleasant.   bedside.    Interaction/Conversation:   Today is the one year anniversary of the death of the patient's daughter.  Patient asked to say a prayer for her.    Assessment:  Need -- Seeking Spiritual Assistance and Support    Distress -- Grief/Loss/Bereavement    Orthodoxy Needs:   Prayer    Interventions:   compassionate presence, emotional support, prayer    Outcomes:   Spiritual Comfort, Progress with Grief, Emotional Release    Total Time:   10 minutes    Plan:   Visit Upon Request      Spiritual Care Provider Information:  Chaplain JAZMYN Babcock  [phone]    [email or web address]

## 2020-07-07 NOTE — CONSULTS
"PSYCHIATRIC FOLLOW-UP:(established)  *Reason for admission: complaint of suicidal ideation, plan to overdose on propranolol.  She is apparently tried this once previously.  Patient has been feeling suicidal since the death of her daughter 3 weeks ago, apparently killed by a drunk  (this conflicts with other info in the records).  She states past history of alcoholism, had been sober for the last year, started drinking heavily (I gallon/d) with the death of her daughter, estimates up to a gallon of alcohol daily           *Legal Hold Status:  +, now dc'd                  *HPI:  She looks dramatically better, clear in mentation as well. Pt says she feels much better as well. She does not want to drink again as this does not honor her dtr's memory and today that family is getting together to spread the ashes. She would like to go home.    Her plan for alcohol: she has a sponsor already. She and her  are going to take more walks.  She will be attending AA remotely through zoom or another platform.     She likes the abilify and feels it helps her feels more clear. no side effects to meds. Sleeping.           *Psychiatric Examination:  Vitals:Blood pressure 110/70, pulse 65, temperature 37 °C (98.6 °F), temperature source Oral, resp. rate 16, height 1.753 m (5' 9\"), weight 73 kg (160 lb 15 oz), SpO2 95 %, not currently breastfeeding.  General Appearance: up and about, good eye contact, cooperative  Abnormal Movements: none  Gait and Posture:  wnl  Speech: wnl  Thought processes:normal rate  Associations: linear  Abnormal or Psychotic Thoughts: none  Judgement and Insight: improved and good  Orientation: intact  Recent and Remote Memory: grossly intact  Attention Span and Concentration: intact  Language: not tested  Fund of Knowledge:not tested  Mood and Affect: euthymic though appropriate emotional when thinking of dtr  SI/HI: denies      *ASSESSMENT/PLAN:  1.Encephalopathy: resolved     2.Alcohol use " disorder  - severe         3. Methamphetamine use disorder  - hx of being severe, current use unknown     4. Hx of bipolar I disorder: unclear if it is active now or not  -zyprexa 10 mg: dc'd  -add abiilty 10 mg  -add zoloft 50 mg  --depakote can be dc'd     4.Medical:  - HTN    -hypothyroidism  -SZ disorder (notes)  -hepatic steatosis  -chronic back pain: gabapentin 300 mg tid.          *Legal hold: dc'd. Scripts. Referrals requested from .           *Signing off  *Thank you for the consult

## 2020-07-07 NOTE — PROGRESS NOTES
Patient yelling and mad that we are keeping her here on her daughters death and we are ruining her chance to see her other daughters. Patient reports 0 on ciwa scale when thinks she can leave and reports she will be fine but when realizes she is unable to leave scores very high and wants anti anxiety medications and pain medications consistently.

## 2020-07-07 NOTE — CARE PLAN
Problem: Pain Management  Goal: Pain level will decrease to patient's comfort goal  Outcome: PROGRESSING AS EXPECTED   Patient reports 10/10 pain, PRN medication given per MAR.  Problem: Psychosocial Needs:  Goal: Level of anxiety will decrease  Outcome: PROGRESSING AS EXPECTED   Patient level of anxiety decreased t/o the shift.

## 2020-07-07 NOTE — DISCHARGE PLANNING
Legal Hold    Referral: Legal Hold Court     Intervention: Pt presented for legal hold meeting with  via video conferencing.  advised pt will meet with court MD's via telemedicine monitor to contest the legal hold.      Plan: Pt will present to telemedicine mental health to meet with court physicians 7/8. Will call bedside RN once time has been determined.

## 2020-07-07 NOTE — DISCHARGE SUMMARY
Discharge Summary    CHIEF COMPLAINT ON ADMISSION  Chief Complaint   Patient presents with   • Suicidal Ideation   • Detox       Reason for Admission  SI/DETOX     Admission Date  6/26/2020    CODE STATUS  Full Code    HPI & HOSPITAL COURSE  Ms. Nancy Olvera is a 45 y.o. female past medical history significant for bipolar disorder, depression, hypertension, hypothyroid and congestive heart failure who was brought in by EMS from [facility] presented on 6/26/2020 where she had originally presented for alcohol detoxification.  There she was found to be suicidal and was placed on a legal hold.  She was seen in the emergency department where she was diagnosed with alcoholism, suicidal ideation, and alcohol induced acute pancreatitis.  CT abdomen pelvis demonstrated hepatic steatosis without pancreatitis.  Urine drug screen positive for cannabinoids and amphetamines.  She was given IV fluids, Reglan, and a GI cocktail and admitted to the hospital. she went into alcohol withdrawal syndrome becoming too difficult to manage with standard CIWA protocol/Ativan and was transferred to ICU placed on Precedex drip.  Patient was transferred back to the floor and he is through with alcohol withdrawal.  Pancreatitis has resolved and patient tolerating p.o.  She is no longer suicidal and legal hold was discontinued by psychiatry on 7/7/2020.  Patient medically stable for discharge home.    Therefore, she is discharged in fair and stable condition to home with close outpatient follow-up.    The patient met 2-midnight criteria for an inpatient stay at the time of discharge.    Discharge Date  7/7/2020    FOLLOW UP ITEMS POST DISCHARGE  None    DISCHARGE DIAGNOSES  Principal Problem (Resolved):    Suicide ideation POA: Yes  Active Problems:    Manic depressive illness (HCC) POA: Yes    Essential hypertension POA: Yes    Gastroesophageal reflux disease with esophagitis POA: Yes    Hypothyroidism POA: Yes    Electrolyte abnormality  POA: Yes  Resolved Problems:    Alcohol withdrawal (HCC) POA: Yes    Elevated liver function tests POA: Yes    Abdominal pain POA: Unknown    Thrombocytopenia (HCC) POA: Yes    Tobacco abuse POA: Yes      FOLLOW UP  [address]  [place] [id number] [phone]  Schedule an appointment as soon as possible for a visit  Please call to establish with a Primary Care Physicain and schedule your hospital follow up. Thank you.      MEDICATIONS ON DISCHARGE     Medication List      START taking these medications      Instructions   ARIPiprazole 10 MG Tabs  Start taking on:  July 8, 2020  Commonly known as:  ABILIFY   Take 1 Tab by mouth every day.  Dose:  10 mg     nicotine polacrilex 2 MG Gum  Commonly known as:  NICORETTE   Take 1 Each by mouth every 2 hours as needed for Smoking Cessation.  Dose:  2 mg     oxyCODONE immediate-release 5 MG Tabs  Commonly known as:  ROXICODONE   Take 1 Tab by mouth every 6 hours as needed for up to 4 days.  Dose:  5 mg     sertraline 100 MG Tabs  Start taking on:  July 8, 2020  Commonly known as:  ZOLOFT   Take 1 Tab by mouth every day.  Dose:  100 mg        CHANGE how you take these medications      Instructions   divalproex  MG Tb24  What changed:  how much to take  Commonly known as:  DEPAKOTE ER   Take 1 Tab by mouth every bedtime.  Dose:  500 mg        CONTINUE taking these medications      Instructions   gabapentin 300 MG Caps  Commonly known as:  NEURONTIN   Take 300 mg by mouth 3 times a day.  Dose:  300 mg     levothyroxine 75 MCG Tabs  Commonly known as:  SYNTHROID   Take 75 mcg by mouth Every morning on an empty stomach.  Dose:  75 mcg     omeprazole 20 MG delayed-release capsule  Commonly known as:  PRILOSEC   Take 20 mg by mouth 2 times a day.  Dose:  20 mg        STOP taking these medications    propranolol 10 MG Tabs  Commonly known as:  INDERAL     ZyPREXA 10 MG tablet  Generic drug:  olanzapine       "      Allergies  Allergies   Allergen Reactions   • Aspirin Anaphylaxis   • Compazine Swelling   • Sulfa Drugs Rash   • Tetracyclines Swelling   • Ultram [Tramadol Hcl] Swelling   • Food      \"Green Peppers\"       DIET  Orders Placed This Encounter   Procedures   • Diet Order Regular (No Sharps)     Paperware only on meal tray.     Standing Status:   Standing     Number of Occurrences:   1     Order Specific Question:   Diet:     Answer:   Regular [1]     Comments:   No Sharps       ACTIVITY  As tolerated.  Weight bearing as tolerated    CONSULTATIONS  Psych    PROCEDURES  Midline    LABORATORY  Lab Results   Component Value Date    SODIUM 137 07/07/2020    POTASSIUM 3.9 07/07/2020    CHLORIDE 103 07/07/2020    CO2 24 07/07/2020    GLUCOSE 105 (H) 07/07/2020    BUN 8 07/07/2020    CREATININE 0.58 07/07/2020        Lab Results   Component Value Date    WBC 9.1 07/07/2020    HEMOGLOBIN 12.8 07/07/2020    HEMATOCRIT 39.6 07/07/2020    PLATELETCT 389 07/07/2020        I discussed medications and side effects with the patient.  I discussed prognosis and importance of medical compliance with the patient.  I counseled the patient about diet, exercise, weight loss, smoking cessation, and life style modifications.  All questions and concerns have been addressed.  Total time of the discharge process was 37 minutes.    "

## 2020-07-07 NOTE — DISCHARGE INSTRUCTIONS
Discharge Instructions    Discharged to home by car with relative. Discharged via walking, hospital escort: Refused.  Special equipment needed: Not Applicable    Be sure to schedule a follow-up appointment with your primary care doctor or any specialists as instructed.     Discharge Plan:   Diet Plan: Discussed  Activity Level: Discussed  Confirmed Follow up Appointment: Patient to Call and Schedule Appointment  Confirmed Symptoms Management: Discussed  Medication Reconciliation Updated: Yes    I understand that a diet low in cholesterol, fat, and sodium is recommended for good health. Unless I have been given specific instructions below for another diet, I accept this instruction as my diet prescription.   Other diet: regular    Special Instructions: None    · Is patient discharged on Warfarin / Coumadin?   No     Depression / Suicide Risk    As you are discharged from this [facility] facility, it is important to learn how to keep safe from harming yourself.    Recognize the warning signs:  · Abrupt changes in personality, positive or negative- including increase in energy   · Giving away possessions  · Change in eating patterns- significant weight changes-  positive or negative  · Change in sleeping patterns- unable to sleep or sleeping all the time   · Unwillingness or inability to communicate  · Depression  · Unusual sadness, discouragement and loneliness  · Talk of wanting to die  · Neglect of personal appearance   · Rebelliousness- reckless behavior  · Withdrawal from people/activities they love  · Confusion- inability to concentrate     If you or a loved one observes any of these behaviors or has concerns about self-harm, here's what you can do:  · Talk about it- your feelings and reasons for harming yourself  · Remove any means that you might use to hurt yourself (examples: pills, rope, extension cords, firearm)  · Get professional help from the community (Mental Health, Substance Abuse, psychological  counseling)  · Do not be alone:Call your Safe Contact- someone whom you trust who will be there for you.  · Call your local CRISIS HOTLINE 213-8870 or 904-446-4075  · Call your local Children's Mobile Crisis Response Team [place] (010) 900-6961 or www.TUNJI  · Call the toll free National Suicide Prevention Hotlines   · National Suicide Prevention Lifeline 378-369-ITEX (7168)  · National Hope Line Network 800-SUICIDE (019-1550)      Discharge Instructions per Dr. Wong Sanchez, D.O.    DIET: Diet Order Regular (No Sharps)    ACTIVITY: As tolerated    A proper diet that is low in grease, fat, and salt, along with 30 minutes of exercise per day will lead to weight loss, and better controlled blood sugar and blood pressure.    DIAGNOSIS: Suicide ideation    Follow up with your Primary Care Provider Pcp Pt States None as scheduled or sooner if your symptoms persist or worsen.  Return to Emergency Room for sever chest pain, shortness of breath, signs of a stroke, or any other emergencies.          Acute Pancreatitis    The pancreas is a gland that is located behind the stomach on the left side of the abdomen. It produces enzymes that help to digest food. The pancreas also releases the hormones glucagon and insulin, which help to regulate blood sugar. Acute pancreatitis happens when inflammation of the pancreas suddenly occurs and the pancreas becomes irritated and swollen.  Most acute attacks last a few days and cause serious problems. Some people become dehydrated and develop low blood pressure. In severe cases, bleeding in the abdomen can lead to shock and can be life-threatening. The lungs, heart, and kidneys may fail.  What are the causes?  This condition may be caused by:  · Alcohol abuse.  · Drug abuse.  · Gallstones or other conditions that can block the tube that drains the pancreas (pancreatic duct).  · A tumor in the pancreas.  Other causes include:  · Certain medicines.  · Exposure to certain  chemicals.  · Diabetes.  · An infection in the pancreas.  · Damage caused by an accident (trauma).  · The poison (venom) from a scorpion bite.  · Abdominal surgery.  · Autoimmune pancreatitis. This is when the body's disease-fighting (immune) system attacks the pancreas.  · Genes that are passed from parent to child (inherited).  In some cases, the cause of this condition is not known.  What are the signs or symptoms?  Symptoms of this condition include:  · Pain in the upper abdomen that may radiate to the back. Pain may be severe.  · Tenderness and swelling of the abdomen.  · Nausea and vomiting.  · Fever.  How is this diagnosed?  This condition may be diagnosed based on:  · A physical exam.  · Blood tests.  · Imaging tests, such as X-rays, CT or MRI scans, or an ultrasound of the abdomen.  How is this treated?  Treatment for this condition usually requires a stay in the hospital. Treatment for this condition may include:  · Pain medicine.  · Fluid replacement through an IV.  · Placing a tube in the stomach to remove stomach contents and to control vomiting (NG tube, or nasogastric tube).  · Not eating for 3-4 days. This gives the pancreas a rest, because enzymes are not being produced that can cause further damage.  · Antibiotic medicines, if your condition is caused by an infection.  · Treating any underlying conditions that may be the cause.  · Steroid medicines, if your condition is caused by your immune system attacking your body's own tissues (autoimmune disease).  · Surgery on the pancreas or gallbladder.  Follow these instructions at home:  Eating and drinking    · Follow instructions from your health care provider about diet. This may involve avoiding alcohol and decreasing the amount of fat in your diet.  · Eat smaller, more frequent meals. This reduces the amount of digestive fluids that the pancreas produces.  · Drink enough fluid to keep your urine pale yellow.  · Do not drink alcohol if it caused your  condition.  General instructions  · Take over-the-counter and prescription medicines only as told by your health care provider.  · Do not drive or use heavy machinery while taking prescription pain medicine.  · Ask your health care provider if the medicine prescribed to you can cause constipation. You may need to take steps to prevent or treat constipation, such as:  ? Take an over-the-counter or prescription medicine for constipation.  ? Eat foods that are high in fiber such as whole grains and beans.  ? Limit foods that are high in fat and processed sugars, such as fried or sweet foods.  · Do not use any products that contain nicotine or tobacco, such as cigarettes, e-cigarettes, and chewing tobacco. If you need help quitting, ask your health care provider.  · Get plenty of rest.  · If directed, check your blood sugar at home as told by your health care provider.  · Keep all follow-up visits as told by your health care provider. This is important.  Contact a health care provider if you:  · Do not recover as quickly as expected.  · Develop new or worsening symptoms.  · Have persistent pain, weakness, or nausea.  · Recover and then have another episode of pain.  · Have a fever.  Get help right away if:  · You cannot eat or keep fluids down.  · Your pain becomes severe.  · Your skin or the white part of your eyes turns yellow (jaundice).  · You have sudden swelling in your abdomen.  · You vomit.  · You feel dizzy or you faint.  · Your blood sugar is high (over 300 mg/dL).  Summary  · Acute pancreatitis happens when inflammation of the pancreas suddenly occurs and the pancreas becomes irritated and swollen.  · This condition is typically caused by alcohol abuse, drug abuse, or gallstones.  · Treatment for this condition usually requires a stay in the hospital.  This information is not intended to replace advice given to you by your health care provider. Make sure you discuss any questions you have with your health  care provider.  Document Released: 12/18/2006 Document Revised: 10/07/2019 Document Reviewed: 06/24/2019  Elsevier Patient Education © 2020 [employer]

## 2020-09-07 ENCOUNTER — HOSPITAL ENCOUNTER (EMERGENCY)
Facility: MEDICAL CENTER | Age: 45
End: 2020-09-07
Attending: EMERGENCY MEDICINE
Payer: MEDICARE

## 2020-09-07 VITALS
SYSTOLIC BLOOD PRESSURE: 118 MMHG | TEMPERATURE: 97 F | HEIGHT: 68 IN | RESPIRATION RATE: 16 BRPM | DIASTOLIC BLOOD PRESSURE: 68 MMHG | BODY MASS INDEX: 23.39 KG/M2 | HEART RATE: 71 BPM | OXYGEN SATURATION: 98 % | WEIGHT: 154.32 LBS

## 2020-09-07 DIAGNOSIS — K08.89 PAIN, DENTAL: ICD-10-CM

## 2020-09-07 PROCEDURE — 42999 UNLISTED PX PHRNX ADND/TNSL: CPT

## 2020-09-07 PROCEDURE — 700102 HCHG RX REV CODE 250 W/ 637 OVERRIDE(OP): Performed by: EMERGENCY MEDICINE

## 2020-09-07 PROCEDURE — 64400 NJX AA&/STRD TRIGEMINAL NRV: CPT

## 2020-09-07 PROCEDURE — 700101 HCHG RX REV CODE 250: Performed by: EMERGENCY MEDICINE

## 2020-09-07 PROCEDURE — A9270 NON-COVERED ITEM OR SERVICE: HCPCS | Performed by: EMERGENCY MEDICINE

## 2020-09-07 PROCEDURE — 99283 EMERGENCY DEPT VISIT LOW MDM: CPT

## 2020-09-07 RX ORDER — FLUCONAZOLE 150 MG/1
150 TABLET ORAL ONCE
Qty: 1 TAB | Refills: 0 | Status: SHIPPED | OUTPATIENT
Start: 2020-09-07 | End: 2020-09-07

## 2020-09-07 RX ORDER — PENICILLIN V POTASSIUM 500 MG/1
500 TABLET ORAL ONCE
Status: COMPLETED | OUTPATIENT
Start: 2020-09-07 | End: 2020-09-07

## 2020-09-07 RX ORDER — HYDROCODONE BITARTRATE AND ACETAMINOPHEN 5; 325 MG/1; MG/1
1 TABLET ORAL EVERY 4 HOURS PRN
Qty: 12 TAB | Refills: 0 | Status: SHIPPED | OUTPATIENT
Start: 2020-09-07 | End: 2020-09-10

## 2020-09-07 RX ORDER — PENICILLIN V POTASSIUM 500 MG/1
500 TABLET ORAL 4 TIMES DAILY
Qty: 28 TAB | Refills: 0 | Status: SHIPPED | OUTPATIENT
Start: 2020-09-07 | End: 2020-09-14

## 2020-09-07 RX ORDER — HYDROCODONE BITARTRATE AND ACETAMINOPHEN 5; 325 MG/1; MG/1
2 TABLET ORAL ONCE
Status: COMPLETED | OUTPATIENT
Start: 2020-09-07 | End: 2020-09-07

## 2020-09-07 RX ORDER — BUPIVACAINE HYDROCHLORIDE AND EPINEPHRINE 5; 5 MG/ML; UG/ML
10 INJECTION, SOLUTION EPIDURAL; INTRACAUDAL; PERINEURAL ONCE
Status: COMPLETED | OUTPATIENT
Start: 2020-09-07 | End: 2020-09-07

## 2020-09-07 RX ADMIN — HYDROCODONE BITARTRATE AND ACETAMINOPHEN 2 TABLET: 5; 325 TABLET ORAL at 22:22

## 2020-09-07 RX ADMIN — PENICILLIN V POTASSIUM 500 MG: 500 TABLET, FILM COATED ORAL at 22:22

## 2020-09-07 RX ADMIN — BUPIVACAINE HYDROCHLORIDE AND EPINEPHRINE 10 ML: 5; 5 INJECTION, SOLUTION EPIDURAL; INTRACAUDAL; PERINEURAL at 21:26

## 2020-09-07 SDOH — HEALTH STABILITY: MENTAL HEALTH: HOW OFTEN DO YOU HAVE A DRINK CONTAINING ALCOHOL?: NEVER

## 2020-09-07 ASSESSMENT — FIBROSIS 4 INDEX: FIB4 SCORE: 1.23

## 2020-09-07 ASSESSMENT — PAIN DESCRIPTION - PAIN TYPE: TYPE: ACUTE PAIN

## 2020-09-08 NOTE — ED TRIAGE NOTES
Chief Complaint   Patient presents with   • Mouth Pain     sudden onset at 1700   • Tooth Abscess     Right lower     Patient to triage via ambulation, with a steady gait, patient A&O x4.      Explained wait time and triage process to pt. Pt placed back in lobby, told to notify ED tech or triage RN of any changes, verbalized understanding.

## 2020-09-08 NOTE — ED PROVIDER NOTES
"ED Provider Note    CHIEF COMPLAINT  Chief Complaint   Patient presents with   • Mouth Pain     sudden onset at 1700   • Tooth Abscess     Right lower       HPI  Nancy Olvera is a 45 y.o. female who presents for evaluation of right lower jaw pain and swelling.  Patient noted the pain started this afternoon after a late lunch around 3 PM.  She notes there is swelling at the gumline and the pain is intense.  She notes no fevers or chills and no neck pain or swelling.  She has no sore throat or difficulty swallowing/speaking.    REVIEW OF SYSTEMS  Constitutional: No fevers or chills  Skin: No rashes  HEENT: No ear pain. No sore throat, runny nose, sores, trouble swallowing, trouble speaking.  Neck: No neck pain, stiffness, or masses.   Immuno: No hx of recurrent infections      PAST MEDICAL HISTORY   has a past medical history of ADHD (attention deficit hyperactivity disorder), Bipolar affective disorder (HCC), Cancer (HCC), Congestive heart failure (HCC), Hypertension, Psychiatric disorder, Seizure disorder (HCC), and Thyroid condition.    SOCIAL HISTORY  Social History     Tobacco Use   • Smoking status: Current Every Day Smoker     Packs/day: 0.25     Types: Cigarettes   • Smokeless tobacco: Never Used   Substance and Sexual Activity   • Alcohol use: Never     Frequency: Never     Binge frequency: Daily or almost daily     Comment: hx 1/5th of vodka a day, quit 07/2020   • Drug use: Not Currently     Types: Inhaled   • Sexual activity: Not on file       SURGICAL HISTORY   has a past surgical history that includes tonsillectomy and thyroidectomy.    CURRENT MEDICATIONS  Home Medications    **Home medications have not yet been reviewed for this encounter**         ALLERGIES  Allergies   Allergen Reactions   • Aspirin Anaphylaxis   • Compazine Swelling   • Sulfa Drugs Rash   • Tetracyclines Swelling   • Ultram [Tramadol Hcl] Swelling   • Food      \"Green Peppers\"       PHYSICAL EXAM  VITAL SIGNS: /68   " "Pulse 71   Temp 36.1 °C (97 °F)   Resp 16   Ht 1.727 m (5' 8\")   Wt 70 kg (154 lb 5.2 oz)   LMP 09/07/2020   SpO2 98%   Breastfeeding No   BMI 23.46 kg/m²    Gen: Alert, mildly anxious  HEENT: Right mandibular first bicuspid eroded to the gumline.  The adjacent gingiva is mildly erythematous and there is no fluctuant mass adjacent to or in the general vicinity of this tooth..  There area is tender to the patient.  There is no significant facial swelling or erythema and no induration to the cheek or mandible.  No signs of trauma, Bilateral external ears normal, Nose normal. Conjunctiva normal, Non-icteric.   Neck:  No tenderness, Supple, No masses  Lymphatic: No cervical lymphadenopathy noted.   Cardiovascular: Regular rate and rhythm, no murmurs.  Capillary refill less than 3 seconds to all extremities, 2+ distal pulses.  Thorax & Lungs: No respiratory distress  Abdomen: No distention   skin: Warm, Dry, No erythema, No rash noted to exposed areas.       Procedure note   Dental block  Right inferior alveolar block  Indication:, Right mandibular dental pain.  Possible abscess with need for incision and drainage  Consent: Verbal, risks versus benefits and alternatives were discussed with the patient.  All questions were answered and she agreed to proceed with the inferior alveolar block and incision and drainage of fluctuant mass.  2 cc 1.5% Marcaine with epinephrine were injected into the inferior alveolar region and 1 cc was injected in the gumline adjacent to the right posterior mandible.  This resulted in almost complete anesthesia of the area that was painful originally.  Gumline was again examined, there still is no fluctuant area.  Patient tolerated well there were no complications      I reviewed prescription monitoring program for patient's narcotic use before prescribing a scheduled drug.The patient will not drink alcohol nor drive with prescribed medications. The patient will return for new or " worsening symptoms and is stable at the time of discharge.     The patient is referred to a primary physician for blood pressure management, diabetic screening, and for all other preventative health concerns.     In prescribing controlled substances to this patient, I certify that I have obtained and reviewed the medical history of the patient. I have also made a good romina effort to obtain applicable records from other providers who have treated the patient and records did not demonstrate any increased risk of substance abuse that would prevent me from prescribing controlled substances.      I have conducted a physical exam and documented it. I have reviewed the patient's prescription history as maintained by the [place] Prescription Monitoring Program.      I have assessed the patient’s risk for abuse, dependency, and addiction using the validated Opioid Risk Tool available at https://www.mdcalc.com/ghqoia-fkdy-umdy-ort-narcotic-abuse.      Given the above, I believe the benefits of controlled substance therapy outweigh the risks. The reasons for prescribing controlled substances include in my professional opinion, controlled substances are the only reasonable choice for this patient because over-the-counter medications are unlikely to control the pain adequately. Accordingly, I have discussed the risk and benefits, treatment plan, and alternative therapies with the patient.     COURSE & MEDICAL DECISION MAKING  Patient arrives for evaluation of what a dental infection adjacent to a tooth that has been eroded to gumline by dental lexie.  Patient has poor dentition at baseline and notes that she does have a dentist although she has had several canceled appointments due to the pandemic.  She does not appear septic or toxic and has no findings or symptoms to suggest parapharyngeal spread of the infection.  She did not have any significant findings to suggest the need for an incision and drainage of the gingival  abscess and I did not feel further advanced imaging or laboratory evaluation would benefit the patient or  emergently.  I suspect this is a new onset dental infection, possibly a periapical abscess, however I feel this can be safely treated empirically with penicillin and pain medication.  She does have follow-up with a dentist but will be encouraged to return if her symptoms worsen or change in any way.  FINAL IMPRESSION  1. Pain, dental        Electronically signed by: Andi Paul M.D., 9/7/2020 9:08 PM

## 2020-09-08 NOTE — ED NOTES
Patient educated on discharge instructions, follow up appointments, prescriptions, and home care. Patient verbalized understanding. Patient requested additional antifungal medication as she stated she tends to get infections following antibiotics. ERP notified

## 2020-09-27 ENCOUNTER — HOSPITAL ENCOUNTER (EMERGENCY)
Facility: MEDICAL CENTER | Age: 45
End: 2020-09-27
Attending: EMERGENCY MEDICINE
Payer: MEDICARE

## 2020-09-27 ENCOUNTER — APPOINTMENT (OUTPATIENT)
Dept: RADIOLOGY | Facility: MEDICAL CENTER | Age: 45
End: 2020-09-27
Attending: EMERGENCY MEDICINE
Payer: MEDICARE

## 2020-09-27 VITALS
BODY MASS INDEX: 22.42 KG/M2 | HEIGHT: 68 IN | OXYGEN SATURATION: 95 % | RESPIRATION RATE: 16 BRPM | WEIGHT: 147.93 LBS | DIASTOLIC BLOOD PRESSURE: 75 MMHG | SYSTOLIC BLOOD PRESSURE: 142 MMHG | TEMPERATURE: 97.6 F | HEART RATE: 97 BPM

## 2020-09-27 DIAGNOSIS — K29.20 ACUTE ALCOHOLIC GASTRITIS WITHOUT HEMORRHAGE: ICD-10-CM

## 2020-09-27 DIAGNOSIS — F10.10 ALCOHOL ABUSE: ICD-10-CM

## 2020-09-27 LAB
ABO + RH BLD: NORMAL
ABO GROUP BLD: NORMAL
ALBUMIN SERPL BCP-MCNC: 4.3 G/DL (ref 3.2–4.9)
ALBUMIN/GLOB SERPL: 1.6 G/DL
ALP SERPL-CCNC: 85 U/L (ref 30–99)
ALT SERPL-CCNC: 15 U/L (ref 2–50)
ANION GAP SERPL CALC-SCNC: 16 MMOL/L (ref 7–16)
APTT PPP: 30.7 SEC (ref 24.7–36)
AST SERPL-CCNC: 22 U/L (ref 12–45)
BASOPHILS # BLD AUTO: 0.7 % (ref 0–1.8)
BASOPHILS # BLD: 0.04 K/UL (ref 0–0.12)
BILIRUB SERPL-MCNC: 0.6 MG/DL (ref 0.1–1.5)
BLD GP AB SCN SERPL QL: NORMAL
BUN SERPL-MCNC: 14 MG/DL (ref 8–22)
CALCIUM SERPL-MCNC: 9.7 MG/DL (ref 8.5–10.5)
CHLORIDE SERPL-SCNC: 89 MMOL/L (ref 96–112)
CO2 SERPL-SCNC: 28 MMOL/L (ref 20–33)
CREAT SERPL-MCNC: 0.64 MG/DL (ref 0.5–1.4)
EKG IMPRESSION: NORMAL
EOSINOPHIL # BLD AUTO: 0.75 K/UL (ref 0–0.51)
EOSINOPHIL NFR BLD: 13.3 % (ref 0–6.9)
ERYTHROCYTE [DISTWIDTH] IN BLOOD BY AUTOMATED COUNT: 49.5 FL (ref 35.9–50)
GLOBULIN SER CALC-MCNC: 2.7 G/DL (ref 1.9–3.5)
GLUCOSE SERPL-MCNC: 107 MG/DL (ref 65–99)
HCT VFR BLD AUTO: 43.6 % (ref 37–47)
HGB BLD-MCNC: 14.3 G/DL (ref 12–16)
IMM GRANULOCYTES # BLD AUTO: 0.02 K/UL (ref 0–0.11)
IMM GRANULOCYTES NFR BLD AUTO: 0.4 % (ref 0–0.9)
INR PPP: 0.84 (ref 0.87–1.13)
LIPASE SERPL-CCNC: 148 U/L (ref 11–82)
LYMPHOCYTES # BLD AUTO: 1.75 K/UL (ref 1–4.8)
LYMPHOCYTES NFR BLD: 31 % (ref 22–41)
MCH RBC QN AUTO: 28.7 PG (ref 27–33)
MCHC RBC AUTO-ENTMCNC: 32.8 G/DL (ref 33.6–35)
MCV RBC AUTO: 87.4 FL (ref 81.4–97.8)
MONOCYTES # BLD AUTO: 0.38 K/UL (ref 0–0.85)
MONOCYTES NFR BLD AUTO: 6.7 % (ref 0–13.4)
NEUTROPHILS # BLD AUTO: 2.71 K/UL (ref 2–7.15)
NEUTROPHILS NFR BLD: 47.9 % (ref 44–72)
NRBC # BLD AUTO: 0 K/UL
NRBC BLD-RTO: 0 /100 WBC
PLATELET # BLD AUTO: 288 K/UL (ref 164–446)
PMV BLD AUTO: 9.8 FL (ref 9–12.9)
POTASSIUM SERPL-SCNC: 3.6 MMOL/L (ref 3.6–5.5)
PROT SERPL-MCNC: 7 G/DL (ref 6–8.2)
PROTHROMBIN TIME: 11.7 SEC (ref 12–14.6)
RBC # BLD AUTO: 4.99 M/UL (ref 4.2–5.4)
RH BLD: NORMAL
SODIUM SERPL-SCNC: 133 MMOL/L (ref 135–145)
WBC # BLD AUTO: 5.7 K/UL (ref 4.8–10.8)

## 2020-09-27 PROCEDURE — 700105 HCHG RX REV CODE 258: Performed by: EMERGENCY MEDICINE

## 2020-09-27 PROCEDURE — 93005 ELECTROCARDIOGRAM TRACING: CPT

## 2020-09-27 PROCEDURE — 86850 RBC ANTIBODY SCREEN: CPT

## 2020-09-27 PROCEDURE — 85610 PROTHROMBIN TIME: CPT

## 2020-09-27 PROCEDURE — 700111 HCHG RX REV CODE 636 W/ 250 OVERRIDE (IP): Performed by: EMERGENCY MEDICINE

## 2020-09-27 PROCEDURE — 85730 THROMBOPLASTIN TIME PARTIAL: CPT

## 2020-09-27 PROCEDURE — 83690 ASSAY OF LIPASE: CPT

## 2020-09-27 PROCEDURE — 86900 BLOOD TYPING SEROLOGIC ABO: CPT

## 2020-09-27 PROCEDURE — 71045 X-RAY EXAM CHEST 1 VIEW: CPT

## 2020-09-27 PROCEDURE — 99285 EMERGENCY DEPT VISIT HI MDM: CPT

## 2020-09-27 PROCEDURE — 96374 THER/PROPH/DIAG INJ IV PUSH: CPT

## 2020-09-27 PROCEDURE — 86901 BLOOD TYPING SEROLOGIC RH(D): CPT

## 2020-09-27 PROCEDURE — 36415 COLL VENOUS BLD VENIPUNCTURE: CPT

## 2020-09-27 PROCEDURE — 93005 ELECTROCARDIOGRAM TRACING: CPT | Performed by: EMERGENCY MEDICINE

## 2020-09-27 PROCEDURE — 80053 COMPREHEN METABOLIC PANEL: CPT

## 2020-09-27 PROCEDURE — 85025 COMPLETE CBC W/AUTO DIFF WBC: CPT

## 2020-09-27 PROCEDURE — 96375 TX/PRO/DX INJ NEW DRUG ADDON: CPT

## 2020-09-27 PROCEDURE — 700102 HCHG RX REV CODE 250 W/ 637 OVERRIDE(OP): Performed by: EMERGENCY MEDICINE

## 2020-09-27 PROCEDURE — A9270 NON-COVERED ITEM OR SERVICE: HCPCS | Performed by: EMERGENCY MEDICINE

## 2020-09-27 RX ORDER — SODIUM CHLORIDE, SODIUM LACTATE, POTASSIUM CHLORIDE, CALCIUM CHLORIDE 600; 310; 30; 20 MG/100ML; MG/100ML; MG/100ML; MG/100ML
1000 INJECTION, SOLUTION INTRAVENOUS ONCE
Status: COMPLETED | OUTPATIENT
Start: 2020-09-27 | End: 2020-09-27

## 2020-09-27 RX ORDER — CHLORDIAZEPOXIDE HYDROCHLORIDE 25 MG/1
25 CAPSULE, GELATIN COATED ORAL ONCE
Status: COMPLETED | OUTPATIENT
Start: 2020-09-27 | End: 2020-09-27

## 2020-09-27 RX ORDER — ONDANSETRON 4 MG/1
4 TABLET, ORALLY DISINTEGRATING ORAL EVERY 8 HOURS PRN
Qty: 9 TAB | Refills: 0 | Status: SHIPPED | OUTPATIENT
Start: 2020-09-27

## 2020-09-27 RX ORDER — ONDANSETRON 2 MG/ML
4 INJECTION INTRAMUSCULAR; INTRAVENOUS ONCE
Status: COMPLETED | OUTPATIENT
Start: 2020-09-27 | End: 2020-09-27

## 2020-09-27 RX ORDER — CHLORDIAZEPOXIDE HYDROCHLORIDE 25 MG/1
50 CAPSULE, GELATIN COATED ORAL ONCE
Status: DISCONTINUED | OUTPATIENT
Start: 2020-09-27 | End: 2020-09-27

## 2020-09-27 RX ORDER — CHLORDIAZEPOXIDE HYDROCHLORIDE 25 MG/1
25 CAPSULE, GELATIN COATED ORAL SEE ADMIN INSTRUCTIONS
Qty: 16 CAP | Refills: 0 | Status: SHIPPED | OUTPATIENT
Start: 2020-09-27 | End: 2020-10-02

## 2020-09-27 RX ADMIN — SODIUM CHLORIDE, POTASSIUM CHLORIDE, SODIUM LACTATE AND CALCIUM CHLORIDE 1000 ML: 600; 310; 30; 20 INJECTION, SOLUTION INTRAVENOUS at 21:34

## 2020-09-27 RX ADMIN — FENTANYL CITRATE 50 MCG: 50 INJECTION INTRAMUSCULAR; INTRAVENOUS at 21:34

## 2020-09-27 RX ADMIN — CHLORDIAZEPOXIDE HYDROCHLORIDE 25 MG: 25 CAPSULE ORAL at 22:31

## 2020-09-27 RX ADMIN — ONDANSETRON 4 MG: 2 INJECTION INTRAMUSCULAR; INTRAVENOUS at 21:34

## 2020-09-27 ASSESSMENT — FIBROSIS 4 INDEX: FIB4 SCORE: 1.23

## 2020-09-28 NOTE — ED TRIAGE NOTES
BIB EMS to triage w/ c/o epigastric pain, dizziness, bloody stools, blood in vomit x 4 days & detox of etoh x 1 day.  Hx of etoh abuse, last drink last night at 2300.  Typically drink 1 pint per day of vodka.

## 2020-09-28 NOTE — ED NOTES
Pt aox4, skin pink warm and dry, airway patent, rr even and unlabored, nad noted. No new complaints. Ambulates with  upon discharge without new incident or distress.

## 2020-09-28 NOTE — ED PROVIDER NOTES
"ED Provider Note    CHIEF COMPLAINT  Chief Complaint   Patient presents with   • Epigastric Pain   • Dizziness   • Bloody Stools       HPI  Nancy Olvera is a 45 y.o. female who presents epigastric pain, lightheadedness, bloody stool.  Has a known history of pancreatitis and believes that she is having another pancreatitis flareup.  She states that she drinks about 1 pint of alcohol daily and would like to try to stop drinking.  Has stopped successfully through a rehab program in the past.  She would like to try on her own this time.  She states that she is slightly depressed because her daughter  a little over a year ago.  She denies any active suicidal or homicidal ideation.  She notes some bloody and black stool recently.  Notes occasional bloody emesis.  No emesis here in the emergency department however states that she has been vomiting \"every 30 minutes\" for the past few days.  Has epigastric pain.  No prior abdominal surgeries.  No fevers.  No chest pain or trouble breathing.  No diarrhea.  She notes that she is having some vaginal bleeding currently from menstrual cycle.    REVIEW OF SYSTEMS  See HPI for further details. All other systems are negative.     PAST MEDICAL HISTORY   has a past medical history of ADHD (attention deficit hyperactivity disorder), Bipolar affective disorder (HCC), Cancer (HCC), Congestive heart failure (HCC), Hypertension, Psychiatric disorder, Seizure disorder (HCC), and Thyroid condition.    SOCIAL HISTORY  Social History     Tobacco Use   • Smoking status: Current Every Day Smoker     Packs/day: 0.25     Types: Cigarettes   • Smokeless tobacco: Never Used   Substance and Sexual Activity   • Alcohol use: Never     Frequency: Never     Binge frequency: Daily or almost daily     Comment: hx  of vodka a day, quit 2020   • Drug use: Not Currently     Types: Inhaled   • Sexual activity: Not on file       SURGICAL HISTORY   has a past surgical history that includes " "tonsillectomy and thyroidectomy.    CURRENT MEDICATIONS  Home Medications    **Home medications have not yet been reviewed for this encounter**         ALLERGIES  Allergies   Allergen Reactions   • Aspirin Anaphylaxis   • Compazine Swelling   • Sulfa Drugs Rash   • Tetracyclines Swelling   • Ultram [Tramadol Hcl] Swelling   • Food      \"Green Peppers\"       PHYSICAL EXAM  VITAL SIGNS: /77   Pulse (!) 120   Temp 37.1 °C (98.8 °F) (Temporal)   Resp 18   Ht 1.727 m (5' 8\")   Wt 67.1 kg (147 lb 14.9 oz)   LMP 09/27/2020   SpO2 97%   BMI 22.49 kg/m²   Pulse ox interpretation: I interpret this pulse ox as normal.  Constitutional: Alert in no apparent distress.  HENT: No signs of trauma, Bilateral external ears normal, Nose normal.   Eyes: Pupils are equal and reactive, Conjunctiva normal, Non-icteric.   Neck: Normal range of motion, No tenderness, Supple, No stridor.   Cardiovascular: Regular rate and rhythm.   Thorax & Lungs: Normal breath sounds, No respiratory distress, No wheezing, No chest tenderness.   Abdomen: Bowel sounds normal, Soft, mild epigastric tenderness, No masses, No pulsatile masses. No peritoneal signs.  Rectal: negative for gross blood or melena.  Negative guaiac exam.  Skin: Warm, Dry, No erythema, No rash.   Back: No bony tenderness, No CVA tenderness.   Extremities: Intact distal pulses, No edema, No tenderness, No cyanosis  Musculoskeletal: Good range of motion in all major joints. No tenderness to palpation or major deformities noted.   Neurologic: Alert, No focal deficits noted.       DIAGNOSTIC STUDIES / PROCEDURES    EKG - Physician interpretation  Results for orders placed or performed during the hospital encounter of 09/27/20   EKG (NOW)   Result Value Ref Range    Report       [facility] Emergency Dept.    Test Date:  2020-09-27  Pt Name:    DANIELA WEEKS                 Department: ER  MRN:        [id number]                      Room:  Gender:     Female       "                 Technician: [id number]  :        1975                   Requested By:ER TRIAGE PROTOCOL  Order #:    [id number]                    Reading MD: RUSTAM PHILIP MD    Measurements  Intervals                                Axis  Rate:       108                          P:          84  MS:         172                          QRS:        80  QRSD:       78                           T:          46  QT:         348  QTc:        467    Interpretive Statements  SINUS TACHYCARDIA  BORDERLINE T ABNORMALITIES, ANTERIOR LEADS  Compared to ECG 2019 09:41:46  T-wave abnormality now present  Sinus rhythm no longer present  Electronically Signed On 2020 21:09:40 PDT by RUSTAM PHILIP MD         LABS  Labs Reviewed   CBC WITH DIFFERENTIAL - Abnormal; Notable for the following components:       Result Value    MCHC 32.8 (*)     Eosinophils 13.30 (*)     Eos (Absolute) 0.75 (*)     All other components within normal limits    Narrative:     Indicate which anticoagulants the patient is on:->UNKNOWN   COMP METABOLIC PANEL - Abnormal; Notable for the following components:    Sodium 133 (*)     Chloride 89 (*)     Glucose 107 (*)     All other components within normal limits    Narrative:     Indicate which anticoagulants the patient is on:->UNKNOWN   LIPASE - Abnormal; Notable for the following components:    Lipase 148 (*)     All other components within normal limits    Narrative:     Indicate which anticoagulants the patient is on:->UNKNOWN   PROTHROMBIN TIME - Abnormal; Notable for the following components:    PT 11.7 (*)     INR 0.84 (*)     All other components within normal limits    Narrative:     Indicate which anticoagulants the patient is on:->UNKNOWN   COD (ADULT)   APTT    Narrative:     Indicate which anticoagulants the patient is on:->UNKNOWN   ESTIMATED GFR    Narrative:     Indicate which anticoagulants the patient is on:->UNKNOWN   ABO RH CONFIRM         RADIOLOGY  DX-CHEST-PORTABLE (1 VIEW)    Final Result         1.  No acute cardiopulmonary disease.   2.  Nodular densities overlying the bilateral lung bases, appearance favors nipple shadows, could be definitively characterized with nipple markers to exclude pulmonary nodules.            COURSE & MEDICAL DECISION MAKING    Medications   ondansetron (ZOFRAN) syringe/vial injection 4 mg (4 mg Intravenous Given 9/27/20 2134)   lactated ringers infusion (BOLUS) (0 mL Intravenous Stopped 9/27/20 2232)   fentaNYL (SUBLIMAZE) injection 50 mcg (50 mcg Intravenous Given 9/27/20 2134)   chlordiazePOXIDE (LIBRIUM) capsule 25 mg (25 mg Oral Given 9/27/20 2231)       Pertinent Labs & Imaging studies reviewed. (See chart for details)  45 y.o. female presenting with epigastric pain and vomiting and concerns for possible pancreatitis as she has had similar symptoms in the past.  Patient has a chronic history of alcohol abuse drinking about a pint of vodka daily.  She states that she would like to quit drinking.  No fevers.  Notes some hematemesis and hematochezia and melena.  Guaiac exam is negative for blood or melena.  Hemodynamically stable though is slightly tachycardic here.  No tremulousness or signs of obvious alcohol withdrawal at this time.    Patient is hemodynamically stable with stable hemoglobin and no signs of active hemorrhage.  No hematemesis here in the emergency department.  In fact the patient does not have active emesis throughout her stay here though she notes that she has had vomiting every 30 minutes at home.  She was treated with Zofran and IV fluid hydration which may have helped.  Has slightly elevated lipase likely related to her vomiting and chronic alcohol use.  No obvious liver enzyme abnormalities or tenderness to the right upper quadrant to suggest a biliary process.    Patient was treated symptomatically for suspected low-grade pancreatitis from alcohol abuse.  She was able to tolerate oral intake.  I do believe that the patient can be  successfully treated on an outpatient basis with a liquid diet and advancing as tolerated to regular diet.  She will be discharged home with Librium for alcohol cessation.  She states that she is committed to stopping alcohol abuse.  She was informed regarding the risks of alcohol abuse while on Librium and how this could result in death from respiratory depression.  She states that she understands and she restated her commitment to getting sober.  She will also be provided with Zofran for symptomatic management of nausea.  She states that after treatment here, she feels much improved and does not have abdominal pain anymore.  Benign abdominal exam prior to discharge.    HYDRATION: Based on the patient's presentation of Acute Vomiting, Dehydration, Inability to take oral fluids and Tachycardia the patient was given IV fluids. IV Hydration was used because oral hydration was not as rapid as required. Upon recheck following hydration, the patient was improved.    In prescribing controlled substances to this patient, I certify that I have obtained and reviewed the medical history of Nancy Olvera. I have also made a good romina effort to obtain applicable records from other providers who have treated the patient and records did not demonstrate any increased risk of substance abuse that would prevent me from prescribing controlled substances.     I have conducted a physical exam and documented it. I have reviewed Ms. Olvera’s prescription history as maintained by the [place] Prescription Monitoring Program.     I have assessed the patient’s risk for abuse, dependency, and addiction using the validated Opioid Risk Tool available at https://www.mdcalc.com/yhugpr-ecby-jang-ort-narcotic-abuse.     Given the above, I believe the benefits of controlled substance therapy outweigh the risks. The reasons for prescribing controlled substances include in my professional opinion, controlled substances are the only reasonable  "choice for this patient because She would like help with alcohol cessation and to help avoid alcohol withdrawal symptoms. Accordingly, I have discussed the risk and benefits, treatment plan, and alternative therapies with the patient.     The patient will not drink alcohol nor drive with prescribed medications.   The patient was instructed to follow-up with primary care physician for further management.  To return immediately for any worsening symptoms or development of any other concerning signs or symptoms. The patient verbalizes understanding in their own words.    /75   Pulse 97   Temp 36.4 °C (97.6 °F) (Temporal)   Resp 16   Ht 1.727 m (5' 8\")   Wt 67.1 kg (147 lb 14.9 oz)   LMP 09/27/2020   SpO2 95%   BMI 22.49 kg/m²     The patient was referred to primary care where they will receive further BP management.      FINAL IMPRESSION  1. Alcohol abuse    2. Acute alcoholic gastritis without hemorrhage            Electronically signed by: Marlon Bello M.D., 9/27/2020 8:41 PM    "

## 2020-10-19 ENCOUNTER — HOSPITAL ENCOUNTER (EMERGENCY)
Facility: MEDICAL CENTER | Age: 45
End: 2020-10-20
Attending: EMERGENCY MEDICINE
Payer: MEDICARE

## 2020-10-19 DIAGNOSIS — R45.851 SUICIDAL IDEATION: ICD-10-CM

## 2020-10-19 DIAGNOSIS — Z91.148 NONCOMPLIANCE WITH MEDICATION REGIMEN: ICD-10-CM

## 2020-10-19 DIAGNOSIS — E03.9 HYPOTHYROIDISM, UNSPECIFIED TYPE: ICD-10-CM

## 2020-10-19 DIAGNOSIS — F10.920 ALCOHOLIC INTOXICATION WITHOUT COMPLICATION (HCC): ICD-10-CM

## 2020-10-19 DIAGNOSIS — Z69.81 PATIENT COUNSELED AS VICTIM OF DOMESTIC VIOLENCE: ICD-10-CM

## 2020-10-19 DIAGNOSIS — F32.2 CURRENT SEVERE EPISODE OF MAJOR DEPRESSIVE DISORDER WITHOUT PSYCHOTIC FEATURES, UNSPECIFIED WHETHER RECURRENT (HCC): ICD-10-CM

## 2020-10-19 LAB
AMPHET UR QL SCN: NEGATIVE
BARBITURATES UR QL SCN: NEGATIVE
BENZODIAZ UR QL SCN: NEGATIVE
BZE UR QL SCN: NEGATIVE
CANNABINOIDS UR QL SCN: POSITIVE
METHADONE UR QL SCN: NEGATIVE
OPIATES UR QL SCN: NEGATIVE
OXYCODONE UR QL SCN: NEGATIVE
PCP UR QL SCN: NEGATIVE
POC BREATHALIZER: 0.33 PERCENT (ref 0–0.01)
PROPOXYPH UR QL SCN: NEGATIVE

## 2020-10-19 PROCEDURE — 80307 DRUG TEST PRSMV CHEM ANLYZR: CPT

## 2020-10-19 PROCEDURE — 700111 HCHG RX REV CODE 636 W/ 250 OVERRIDE (IP)

## 2020-10-19 PROCEDURE — 99285 EMERGENCY DEPT VISIT HI MDM: CPT

## 2020-10-19 PROCEDURE — 302970 POC BREATHALIZER: Performed by: EMERGENCY MEDICINE

## 2020-10-19 RX ORDER — ONDANSETRON 4 MG/1
4 TABLET, ORALLY DISINTEGRATING ORAL ONCE
Status: COMPLETED | OUTPATIENT
Start: 2020-10-19 | End: 2020-10-19

## 2020-10-19 RX ADMIN — ONDANSETRON 4 MG: 4 TABLET, ORALLY DISINTEGRATING ORAL at 22:11

## 2020-10-19 ASSESSMENT — FIBROSIS 4 INDEX: FIB4 SCORE: 0.93

## 2020-10-20 VITALS
HEART RATE: 88 BPM | DIASTOLIC BLOOD PRESSURE: 54 MMHG | HEIGHT: 68 IN | RESPIRATION RATE: 16 BRPM | BODY MASS INDEX: 21.48 KG/M2 | WEIGHT: 141.76 LBS | TEMPERATURE: 97.8 F | SYSTOLIC BLOOD PRESSURE: 99 MMHG | OXYGEN SATURATION: 98 %

## 2020-10-20 LAB
POC BREATHALIZER: 0.06 PERCENT (ref 0–0.01)
POC BREATHALIZER: 0.13 PERCENT (ref 0–0.01)
POC BREATHALIZER: 0.24 PERCENT (ref 0–0.01)

## 2020-10-20 PROCEDURE — 700102 HCHG RX REV CODE 250 W/ 637 OVERRIDE(OP)

## 2020-10-20 PROCEDURE — 302970 POC BREATHALIZER: Performed by: EMERGENCY MEDICINE

## 2020-10-20 PROCEDURE — A9270 NON-COVERED ITEM OR SERVICE: HCPCS

## 2020-10-20 PROCEDURE — 90791 PSYCH DIAGNOSTIC EVALUATION: CPT

## 2020-10-20 PROCEDURE — 302970 POC BREATHALIZER

## 2020-10-20 RX ORDER — OLANZAPINE 10 MG/1
10 TABLET ORAL
Qty: 5 TAB | Refills: 0 | Status: SHIPPED | OUTPATIENT
Start: 2020-10-20

## 2020-10-20 RX ORDER — LEVOTHYROXINE SODIUM 0.07 MG/1
75 TABLET ORAL
Qty: 30 TAB | Refills: 0 | Status: SHIPPED | OUTPATIENT
Start: 2020-10-20

## 2020-10-20 RX ORDER — CHLORDIAZEPOXIDE HYDROCHLORIDE 25 MG/1
25 CAPSULE, GELATIN COATED ORAL 3 TIMES DAILY PRN
Qty: 15 CAP | Refills: 0 | Status: SHIPPED | OUTPATIENT
Start: 2020-10-20 | End: 2020-10-25

## 2020-10-20 RX ORDER — OLANZAPINE 5 MG/1
10 TABLET ORAL ONCE
Status: COMPLETED | OUTPATIENT
Start: 2020-10-20 | End: 2020-10-20

## 2020-10-20 RX ADMIN — OLANZAPINE 10 MG: 5 TABLET, FILM COATED ORAL at 01:11

## 2020-10-20 NOTE — ED NOTES
"PT COMPLETELY UNDRESSED, CHANGED INTO GOWN, GIVEN SOCKS AND BLANKETS FOR COMFORT. PT VERY TEARFUL AT THIS TIME. STS HER  \"KICKED HER ACROSS THE ROOM\" \"HES GOING TO BE THE REASON I KILL MYSELF\". PT AMB TO RESTROOM WITH STEADY GAIT. UA COLLECTED AND SENT TO LAB. 2 BAGS OF BELONGINGS CURRENTLY AT NURSES STATION. PT STS DRANK A 5TH PRIOR TO CHECK IN. CURRENTLY COOPERATIVE WITH STAFF WILL CONTINUE TO MONITOR  "

## 2020-10-20 NOTE — ED NOTES
PT BREATHALYZED STILL NOT SOBER, ALERT TEAM NOTIFIED. PT EASILY AROUSABLE, COOPERATIVE WITH STAFF AT THIS TIME. WILL CONTINUE TO MONITOR

## 2020-10-20 NOTE — DISCHARGE PLANNING
[facility]  Crisis/Safety Plan    Name:  Nancy Olvera  MRN:  [id number]  Date:  10/20/2020    Warning signs that a crisis may be developing for me or I may be at risk:  1) Depressed  2) suicidal  3)    Coping strategies I can use on my own (relaxation, physical activity, etc):  1) medication  2) deep breaths  3) walking    Ways I can make my environment safe:  1)no booze  2)no drugs  3)    Things I want to tell myself when I feel a crisis developin) breath  2)   3)    People I can contact for support or distraction (and their phone numbers):  1) Ananth  2) Kim  3)    If I’m not able to reach my support people, or the above strategies don’t help, I can contact the following professionals, agencies, or hotlines:  1) Crisis Call Center ():  [phone] -OR- [phone]  2) Crisis Text Line ():  Text CARE TO 515812  3) Dr Castle  4) Dr Barbra Flores, R.N.

## 2020-10-20 NOTE — ED NOTES
PT MEDICATED FOR NAUSEA. PLACED ON SUP O2 FOR LOW SATS. TV TURNED ON PER REQUEST. ALL NEEDS MET AT THIS TIME. WILL CONTINUE TO MONITOR

## 2020-10-20 NOTE — ED NOTES
PT STILL AWAKE IN ROOM, RESTING IN BED. VSS. GIVEN WATER PER REQUEST. PT REQUESTING TO SPEAK WITH  IN REGARDS TO SAFETY AT HOME. STS SHE DID NOT MEAN IT WHEN SHE SAID SHE WAS GOING TO HURT HERSELF, SHE WAS JUST DRUNK. EXPLAINED TO PT NEEDS TO BE SOBER FIRST. WILL CONTINUE TO MONITOR

## 2020-10-20 NOTE — ED NOTES
Patient's home medications have been reviewed by the pharmacy team.     Past Medical History:   Diagnosis Date   • ADHD (attention deficit hyperactivity disorder)    • Bipolar affective disorder (HCC)    • Cancer (HCC)     pt states she has colon cancer   • Congestive heart failure (HCC)    • Hypertension    • Psychiatric disorder    • Seizure disorder (HCC)    • Thyroid condition        Patient's Medications   New Prescriptions    No medications on file   Previous Medications    ARIPIPRAZOLE (ABILIFY) 10 MG TAB    Take 1 Tab by mouth every day.    DIVALPROEX ER (DEPAKOTE ER) 500 MG TABLET SR 24 HR    Take 1 Tab by mouth every bedtime.    GABAPENTIN (NEURONTIN) 300 MG CAP    Take 300 mg by mouth 3 times a day.    LEVOTHYROXINE (SYNTHROID) 75 MCG TAB    Take 75 mcg by mouth Every morning on an empty stomach.    NICOTINE POLACRILEX (NICORETTE) 2 MG GUM    Take 1 Each by mouth every 2 hours as needed for Smoking Cessation.    OMEPRAZOLE (PRILOSEC) 20 MG DELAYED-RELEASE CAPSULE    Take 20 mg by mouth 2 times a day.    ONDANSETRON (ZOFRAN ODT) 4 MG TABLET DISPERSIBLE    Take 1 Tab by mouth every 8 hours as needed.    SERTRALINE (ZOLOFT) 100 MG TAB    Take 1 Tab by mouth every day.   Modified Medications    No medications on file   Discontinued Medications    No medications on file          A:  The following pharmacotherapy concerns may be contributing to current complaints: pt on multiple meds for psych disorders and hypothyroid in past.  Pt reports being off meds for several months.    P:    Discussed prior use of synthroid and non-compliance with psych meds with ED provider.  Per Dr. Yoon pt reports favorable response to zyprexa.    Pt likely d/c today and will be given prescriptions.    Iván Ndiaye, RobyD, BCPS
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
## 2020-10-20 NOTE — ED NOTES
PT CONTINUES TO BE SLEEPING IN BED, VSS, NADN. REPOSITIONING SELF FOR COMFORT. WILL CONTINUE TO MONITOR

## 2020-10-20 NOTE — ED NOTES
Med Rec complete per Pt  Allergies Reviewed  No ABX in the last 14 days    Pt has not been taking any medications for over 1.5 months
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
## 2020-10-20 NOTE — ED NOTES
.Patient discharged in stable condition per orders. Wristband removed per protocol. Patient verbalized understanding of all discharge instructions. All belongings accounted for.

## 2020-10-20 NOTE — DISCHARGE PLANNING
Alert Team  Consult order noted.  Pt is not currently ready for consult.  Pt will need:  -legal sobriety noted in labs section of Epic.    Once pt is ready for consult, bedside RN to contact AT and have pt added to list of consults.  She will be #1, as she is nearly sober as of 0817.

## 2020-10-20 NOTE — ED NOTES
PT SLEEPING IN BED, REPOSITIONING SELF FOR COMFORT. NADN, VSS. FLUIDS AT BEDSIDE. WILL CONTINUE TO MONITOR
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
## 2020-10-23 ENCOUNTER — HOSPITAL ENCOUNTER (EMERGENCY)
Facility: MEDICAL CENTER | Age: 45
End: 2020-10-23
Attending: EMERGENCY MEDICINE
Payer: MEDICARE

## 2020-10-23 ENCOUNTER — APPOINTMENT (OUTPATIENT)
Dept: RADIOLOGY | Facility: MEDICAL CENTER | Age: 45
End: 2020-10-23
Attending: EMERGENCY MEDICINE
Payer: MEDICARE

## 2020-10-23 VITALS
WEIGHT: 145.5 LBS | TEMPERATURE: 98 F | DIASTOLIC BLOOD PRESSURE: 65 MMHG | BODY MASS INDEX: 22.05 KG/M2 | HEART RATE: 80 BPM | OXYGEN SATURATION: 95 % | RESPIRATION RATE: 16 BRPM | SYSTOLIC BLOOD PRESSURE: 106 MMHG | HEIGHT: 68 IN

## 2020-10-23 DIAGNOSIS — G89.29 CHRONIC ABDOMINAL PAIN: ICD-10-CM

## 2020-10-23 DIAGNOSIS — S93.509A SPRAIN OF TOE, INITIAL ENCOUNTER: ICD-10-CM

## 2020-10-23 DIAGNOSIS — Z76.0 MEDICATION REFILL: ICD-10-CM

## 2020-10-23 DIAGNOSIS — R10.9 CHRONIC ABDOMINAL PAIN: ICD-10-CM

## 2020-10-23 LAB — EKG IMPRESSION: NORMAL

## 2020-10-23 PROCEDURE — 93005 ELECTROCARDIOGRAM TRACING: CPT

## 2020-10-23 PROCEDURE — 93005 ELECTROCARDIOGRAM TRACING: CPT | Performed by: EMERGENCY MEDICINE

## 2020-10-23 PROCEDURE — 700102 HCHG RX REV CODE 250 W/ 637 OVERRIDE(OP): Performed by: EMERGENCY MEDICINE

## 2020-10-23 PROCEDURE — 99284 EMERGENCY DEPT VISIT MOD MDM: CPT

## 2020-10-23 PROCEDURE — A9270 NON-COVERED ITEM OR SERVICE: HCPCS | Performed by: EMERGENCY MEDICINE

## 2020-10-23 PROCEDURE — 73660 X-RAY EXAM OF TOE(S): CPT | Mod: RT

## 2020-10-23 RX ORDER — OLANZAPINE 10 MG/1
10 TABLET ORAL 2 TIMES DAILY
Qty: 12 TAB | Refills: 0 | Status: SHIPPED | OUTPATIENT
Start: 2020-10-23

## 2020-10-23 RX ORDER — OLANZAPINE 5 MG/1
10 TABLET, ORALLY DISINTEGRATING ORAL ONCE
Status: COMPLETED | OUTPATIENT
Start: 2020-10-23 | End: 2020-10-23

## 2020-10-23 RX ORDER — PROMETHAZINE HYDROCHLORIDE 25 MG/1
25 TABLET ORAL EVERY 6 HOURS PRN
Qty: 15 TAB | Refills: 1 | Status: SHIPPED | OUTPATIENT
Start: 2020-10-23

## 2020-10-23 RX ORDER — OLANZAPINE 5 MG/1
10 TABLET, ORALLY DISINTEGRATING ORAL EVERY EVENING
Status: DISCONTINUED | OUTPATIENT
Start: 2020-10-23 | End: 2020-10-23

## 2020-10-23 RX ORDER — PROMETHAZINE HYDROCHLORIDE 25 MG/1
25 TABLET ORAL ONCE
Status: COMPLETED | OUTPATIENT
Start: 2020-10-23 | End: 2020-10-23

## 2020-10-23 RX ADMIN — PROMETHAZINE HYDROCHLORIDE 25 MG: 25 TABLET ORAL at 14:59

## 2020-10-23 RX ADMIN — OLANZAPINE 10 MG: 5 TABLET, ORALLY DISINTEGRATING ORAL at 16:53

## 2020-10-23 ASSESSMENT — FIBROSIS 4 INDEX: FIB4 SCORE: 0.93
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
## 2020-10-23 NOTE — DISCHARGE INSTRUCTIONS
Follow-up with your doctor as scheduled.  Take your medication as prescribed.  Return for any concerns
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
## 2020-10-23 NOTE — ED NOTES
Pt V/S reassessed , within normal limits. Pt explained several times triage criteria, acuity levels, and wait times.

## 2020-10-24 NOTE — ED NOTES
The patient has been provided with discharge education and information.  The patient was also provided with instructions on follow up care and return precautions.  The patient verbalizes understanding of discharge instructions, follow up care, and return precautions.  All questions have been answered.  Two E -  RX written by JANET.  NAD, A/Ric, good color and appropriate at time of discharge.  Patient ambulated steady gait out of department.

## 2020-10-31 NOTE — DISCHARGE PLANNING
Pt called in and said that she is in a lot of pain and unable to walk. Advised pt to return to ED. Pt was concerned that she does not have a ride home but explained to her that she has Medicaid and it will help with her ride home.

## 2020-11-10 ENCOUNTER — HOSPITAL ENCOUNTER (EMERGENCY)
Facility: MEDICAL CENTER | Age: 45
End: 2020-11-10
Attending: EMERGENCY MEDICINE
Payer: MEDICARE

## 2020-11-10 VITALS
HEART RATE: 76 BPM | SYSTOLIC BLOOD PRESSURE: 122 MMHG | RESPIRATION RATE: 16 BRPM | BODY MASS INDEX: 21.19 KG/M2 | OXYGEN SATURATION: 98 % | DIASTOLIC BLOOD PRESSURE: 70 MMHG | WEIGHT: 139.33 LBS | TEMPERATURE: 97.5 F

## 2020-11-10 DIAGNOSIS — F43.21 SITUATIONAL DEPRESSION: ICD-10-CM

## 2020-11-10 DIAGNOSIS — T74.91XA DOMESTIC VIOLENCE OF ADULT, INITIAL ENCOUNTER: ICD-10-CM

## 2020-11-10 DIAGNOSIS — F19.10 POLYSUBSTANCE ABUSE (HCC): ICD-10-CM

## 2020-11-10 DIAGNOSIS — F10.929 ALCOHOLIC INTOXICATION WITH COMPLICATION (HCC): ICD-10-CM

## 2020-11-10 LAB
AMPHET UR QL SCN: POSITIVE
BARBITURATES UR QL SCN: NEGATIVE
BENZODIAZ UR QL SCN: POSITIVE
BZE UR QL SCN: NEGATIVE
CANNABINOIDS UR QL SCN: POSITIVE
METHADONE UR QL SCN: NEGATIVE
OPIATES UR QL SCN: NEGATIVE
OXYCODONE UR QL SCN: NEGATIVE
PCP UR QL SCN: NEGATIVE
POC BREATHALIZER: 0.12 PERCENT (ref 0–0.01)
PROPOXYPH UR QL SCN: NEGATIVE

## 2020-11-10 PROCEDURE — A9270 NON-COVERED ITEM OR SERVICE: HCPCS | Performed by: EMERGENCY MEDICINE

## 2020-11-10 PROCEDURE — 80307 DRUG TEST PRSMV CHEM ANLYZR: CPT

## 2020-11-10 PROCEDURE — 700102 HCHG RX REV CODE 250 W/ 637 OVERRIDE(OP): Performed by: EMERGENCY MEDICINE

## 2020-11-10 PROCEDURE — 302970 POC BREATHALIZER: Performed by: EMERGENCY MEDICINE

## 2020-11-10 PROCEDURE — 99285 EMERGENCY DEPT VISIT HI MDM: CPT

## 2020-11-10 RX ORDER — LORAZEPAM 1 MG/1
1 TABLET ORAL ONCE
Status: COMPLETED | OUTPATIENT
Start: 2020-11-10 | End: 2020-11-10

## 2020-11-10 RX ORDER — OLANZAPINE 5 MG/1
10 TABLET ORAL ONCE
Status: COMPLETED | OUTPATIENT
Start: 2020-11-10 | End: 2020-11-10

## 2020-11-10 RX ADMIN — LORAZEPAM 1 MG: 1 TABLET ORAL at 10:45

## 2020-11-10 RX ADMIN — OLANZAPINE 10 MG: 5 TABLET, FILM COATED ORAL at 10:45

## 2020-11-10 ASSESSMENT — FIBROSIS 4 INDEX: FIB4 SCORE: 0.93
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
## 2020-11-10 NOTE — DISCHARGE PLANNING
ALERT team BH note:writer RN reviewed community MH and CD providers with pt, with written information given, including [facility], [facility], [facility], [facility] ([facility]); Medicare and Medicaid FFS insurance plans; pt refused referral to [facility] (current substance use includes ETOH with last use last night, ETOH breathalyzer on admit 0.28, UDS + Amphetamines, THC); pt denies SI, HI, or self-harm ideation; states current outpt MH provider includes psychiatrist, Dr. Castle; writer RN to send pt referral to [facility]; pt verbalized understanding; writer RN updated [facility] ERP Dr. Zaragoza; pt to DC to self today
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
## 2020-11-10 NOTE — ED NOTES
"Patient yelling out \"I cant take this anymore\" and hitting herself.  Entered room, poc breathylzer given .20.  Water for comfort measures given.  Patient now calm.  Will monitor.    "

## 2020-11-14 ENCOUNTER — HOSPITAL ENCOUNTER (EMERGENCY)
Dept: HOSPITAL 8 - ED | Age: 45
Discharge: HOME | End: 2020-11-14
Payer: MEDICARE

## 2020-11-14 VITALS — SYSTOLIC BLOOD PRESSURE: 112 MMHG | DIASTOLIC BLOOD PRESSURE: 64 MMHG

## 2020-11-14 VITALS — HEIGHT: 68 IN | BODY MASS INDEX: 20.68 KG/M2 | WEIGHT: 136.47 LBS

## 2020-11-14 DIAGNOSIS — Z85.038: ICD-10-CM

## 2020-11-14 DIAGNOSIS — Z76.0: ICD-10-CM

## 2020-11-14 DIAGNOSIS — F41.9: Primary | ICD-10-CM

## 2020-11-14 DIAGNOSIS — R00.0: ICD-10-CM
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
## 2020-11-14 DIAGNOSIS — R10.9: ICD-10-CM

## 2020-11-14 DIAGNOSIS — F10.10: ICD-10-CM

## 2020-11-14 DIAGNOSIS — F17.210: ICD-10-CM

## 2020-11-14 DIAGNOSIS — E03.9: ICD-10-CM

## 2020-11-14 LAB
ALBUMIN SERPL-MCNC: 3.8 G/DL (ref 3.4–5)
ALP SERPL-CCNC: 114 U/L (ref 45–117)
ALT SERPL-CCNC: 46 U/L (ref 12–78)
ANION GAP SERPL CALC-SCNC: 5 MMOL/L (ref 5–15)
BASOPHILS # BLD AUTO: 0 X10^3/UL (ref 0–0.1)
BASOPHILS NFR BLD AUTO: 1 % (ref 0–1)
BILIRUB SERPL-MCNC: 1 MG/DL (ref 0.2–1)
CALCIUM SERPL-MCNC: 8.9 MG/DL (ref 8.5–10.1)
CHLORIDE SERPL-SCNC: 106 MMOL/L (ref 98–107)
CREAT SERPL-MCNC: 0.78 MG/DL (ref 0.55–1.02)
EOSINOPHIL # BLD AUTO: 0.1 X10^3/UL (ref 0–0.4)
EOSINOPHIL NFR BLD AUTO: 1 % (ref 1–7)
ERYTHROCYTE [DISTWIDTH] IN BLOOD BY AUTOMATED COUNT: 17.8 % (ref 9.6–15.2)
LYMPHOCYTES # BLD AUTO: 1 X10^3/UL (ref 1–3.4)
LYMPHOCYTES NFR BLD AUTO: 16 % (ref 22–44)
MCH RBC QN AUTO: 28.6 PG (ref 27–34.8)
MCHC RBC AUTO-ENTMCNC: 33 G/DL (ref 32.4–35.8)
MD: NO
MICROSCOPIC: (no result)
MONOCYTES # BLD AUTO: 0.3 X10^3/UL (ref 0.2–0.8)
MONOCYTES NFR BLD AUTO: 6 % (ref 2–9)
NEUTROPHILS # BLD AUTO: 4.6 X10^3/UL (ref 1.8–6.8)
NEUTROPHILS NFR BLD AUTO: 76 % (ref 42–75)
PLATELET # BLD AUTO: 158 X10^3/UL (ref 130–400)
PMV BLD AUTO: 8.3 FL (ref 7.4–10.4)
PROT SERPL-MCNC: 7.5 G/DL (ref 6.4–8.2)
RBC # BLD AUTO: 5.02 X10^6/UL (ref 3.82–5.3)

## 2020-11-14 PROCEDURE — 83690 ASSAY OF LIPASE: CPT

## 2020-11-14 PROCEDURE — 96372 THER/PROPH/DIAG INJ SC/IM: CPT

## 2020-11-14 PROCEDURE — 85025 COMPLETE CBC W/AUTO DIFF WBC: CPT

## 2020-11-14 PROCEDURE — 99283 EMERGENCY DEPT VISIT LOW MDM: CPT

## 2020-11-14 PROCEDURE — 36415 COLL VENOUS BLD VENIPUNCTURE: CPT

## 2020-11-14 PROCEDURE — 81001 URINALYSIS AUTO W/SCOPE: CPT

## 2020-11-14 PROCEDURE — 80053 COMPREHEN METABOLIC PANEL: CPT

## 2020-11-14 PROCEDURE — 99406 BEHAV CHNG SMOKING 3-10 MIN: CPT

## 2020-11-28 ENCOUNTER — APPOINTMENT (OUTPATIENT)
Dept: RADIOLOGY | Facility: MEDICAL CENTER | Age: 45
End: 2020-11-28
Attending: EMERGENCY MEDICINE
Payer: MEDICARE

## 2020-11-28 ENCOUNTER — HOSPITAL ENCOUNTER (EMERGENCY)
Facility: MEDICAL CENTER | Age: 45
End: 2020-11-28
Attending: EMERGENCY MEDICINE
Payer: MEDICARE

## 2020-11-28 VITALS
OXYGEN SATURATION: 95 % | BODY MASS INDEX: 21.22 KG/M2 | DIASTOLIC BLOOD PRESSURE: 54 MMHG | RESPIRATION RATE: 15 BRPM | HEIGHT: 68 IN | WEIGHT: 140 LBS | HEART RATE: 81 BPM | SYSTOLIC BLOOD PRESSURE: 113 MMHG | TEMPERATURE: 98 F

## 2020-11-28 DIAGNOSIS — F10.10 ALCOHOL ABUSE: ICD-10-CM

## 2020-11-28 LAB
ALBUMIN SERPL BCP-MCNC: 3.9 G/DL (ref 3.2–4.9)
ALBUMIN/GLOB SERPL: 1.6 G/DL
ALP SERPL-CCNC: 100 U/L (ref 30–99)
ALT SERPL-CCNC: 51 U/L (ref 2–50)
ANION GAP SERPL CALC-SCNC: 10 MMOL/L (ref 7–16)
AST SERPL-CCNC: 85 U/L (ref 12–45)
BASOPHILS # BLD AUTO: 0.6 % (ref 0–1.8)
BASOPHILS # BLD: 0.04 K/UL (ref 0–0.12)
BILIRUB SERPL-MCNC: 0.4 MG/DL (ref 0.1–1.5)
BUN SERPL-MCNC: 9 MG/DL (ref 8–22)
CALCIUM SERPL-MCNC: 9.1 MG/DL (ref 8.5–10.5)
CHLORIDE SERPL-SCNC: 100 MMOL/L (ref 96–112)
CO2 SERPL-SCNC: 27 MMOL/L (ref 20–33)
CREAT SERPL-MCNC: 0.68 MG/DL (ref 0.5–1.4)
EKG IMPRESSION: NORMAL
EOSINOPHIL # BLD AUTO: 0.43 K/UL (ref 0–0.51)
EOSINOPHIL NFR BLD: 6.8 % (ref 0–6.9)
ERYTHROCYTE [DISTWIDTH] IN BLOOD BY AUTOMATED COUNT: 57.1 FL (ref 35.9–50)
GLOBULIN SER CALC-MCNC: 2.4 G/DL (ref 1.9–3.5)
GLUCOSE SERPL-MCNC: 103 MG/DL (ref 65–99)
HCT VFR BLD AUTO: 40.6 % (ref 37–47)
HGB BLD-MCNC: 13.1 G/DL (ref 12–16)
IMM GRANULOCYTES # BLD AUTO: 0.04 K/UL (ref 0–0.11)
IMM GRANULOCYTES NFR BLD AUTO: 0.6 % (ref 0–0.9)
LYMPHOCYTES # BLD AUTO: 1.28 K/UL (ref 1–4.8)
LYMPHOCYTES NFR BLD: 20.1 % (ref 22–41)
MCH RBC QN AUTO: 29.1 PG (ref 27–33)
MCHC RBC AUTO-ENTMCNC: 32.3 G/DL (ref 33.6–35)
MCV RBC AUTO: 90.2 FL (ref 81.4–97.8)
MONOCYTES # BLD AUTO: 0.41 K/UL (ref 0–0.85)
MONOCYTES NFR BLD AUTO: 6.4 % (ref 0–13.4)
NEUTROPHILS # BLD AUTO: 4.17 K/UL (ref 2–7.15)
NEUTROPHILS NFR BLD: 65.5 % (ref 44–72)
NRBC # BLD AUTO: 0 K/UL
NRBC BLD-RTO: 0 /100 WBC
PLATELET # BLD AUTO: 149 K/UL (ref 164–446)
PMV BLD AUTO: 10.4 FL (ref 9–12.9)
POTASSIUM SERPL-SCNC: 3.3 MMOL/L (ref 3.6–5.5)
PROT SERPL-MCNC: 6.3 G/DL (ref 6–8.2)
RBC # BLD AUTO: 4.5 M/UL (ref 4.2–5.4)
SODIUM SERPL-SCNC: 137 MMOL/L (ref 135–145)
TROPONIN T SERPL-MCNC: 6 NG/L (ref 6–19)
WBC # BLD AUTO: 6.4 K/UL (ref 4.8–10.8)

## 2020-11-28 PROCEDURE — 93005 ELECTROCARDIOGRAM TRACING: CPT | Performed by: EMERGENCY MEDICINE

## 2020-11-28 PROCEDURE — 84484 ASSAY OF TROPONIN QUANT: CPT

## 2020-11-28 PROCEDURE — 80053 COMPREHEN METABOLIC PANEL: CPT

## 2020-11-28 PROCEDURE — 71045 X-RAY EXAM CHEST 1 VIEW: CPT

## 2020-11-28 PROCEDURE — 93005 ELECTROCARDIOGRAM TRACING: CPT

## 2020-11-28 PROCEDURE — 85025 COMPLETE CBC W/AUTO DIFF WBC: CPT

## 2020-11-28 PROCEDURE — 99284 EMERGENCY DEPT VISIT MOD MDM: CPT

## 2020-11-28 ASSESSMENT — HEART SCORE
TROPONIN: LESS THAN OR EQUAL TO NORMAL LIMIT
HISTORY: SLIGHTLY SUSPICIOUS
RISK FACTORS: 1-2 RISK FACTORS
AGE: 45-64

## 2020-11-28 ASSESSMENT — LIFESTYLE VARIABLES
HAVE YOU EVER FELT YOU SHOULD CUT DOWN ON YOUR DRINKING: YES
EVER FELT BAD OR GUILTY ABOUT YOUR DRINKING: YES
EVER HAD A DRINK FIRST THING IN THE MORNING TO STEADY YOUR NERVES TO GET RID OF A HANGOVER: YES
DOES PATIENT WANT TO STOP DRINKING: YES
TOTAL SCORE: 4
HAVE PEOPLE ANNOYED YOU BY CRITICIZING YOUR DRINKING: YES
DO YOU DRINK ALCOHOL: YES
TOTAL SCORE: 4
TOTAL SCORE: 4
CONSUMPTION TOTAL: INCOMPLETE
DOES PATIENT WANT TO TALK TO SOMEONE ABOUT QUITTING: NO

## 2020-11-28 ASSESSMENT — FIBROSIS 4 INDEX: FIB4 SCORE: 0.93

## 2020-11-29 NOTE — DISCHARGE PLANNING
"Alert Team    Consulted with Dr. Souza; patient noted as requesting resources for alcohol/meth detox. This writer met with the patient and discussed detox options she can utilize (This writer telephoned the [facility], who noted they have a bed available for inpatient detox treatment), however she declined detox services, stating she wasn't \"ready yet to go inpatient, I have to go home and get my stuff first\". This writer provided the patient with chemical dependency resources (Including follow-up recommendations with [facility] for case management, primary care, and housing related concerns) and for detox services at the [facility], which she did accept at the time of the intervention. The patient otherwise denies any safety concerns; she denies SI/HI, as well as AH/VH, and reports no concerns regarding ability to care for self. A full consult will not be necessary at this time. Nothing further to note.   "

## 2020-11-29 NOTE — DISCHARGE INSTRUCTIONS
Use the clinics listed here and the resources provided by Social Work to get help for your alcoholism.
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
## 2020-11-29 NOTE — ED NOTES
Patient discharged in stable condition per orders. Wristband removed per protocol. Patient verbalized understanding of all discharge instructions. All belongings accounted for. Ambulatory for discharge with steady gait.

## 2020-12-22 ENCOUNTER — PATIENT OUTREACH (OUTPATIENT)
Dept: HEALTH INFORMATION MANAGEMENT | Facility: OTHER | Age: 45
End: 2020-12-22

## 2020-12-22 NOTE — PROGRESS NOTES
Outcome: Left Message    Please transfer to Patient Outreach Team at [phone] when patient returns call.    [facility] Verified: yes    Attempt # 1
